# Patient Record
Sex: MALE | Race: BLACK OR AFRICAN AMERICAN | NOT HISPANIC OR LATINO | Employment: OTHER | URBAN - METROPOLITAN AREA
[De-identification: names, ages, dates, MRNs, and addresses within clinical notes are randomized per-mention and may not be internally consistent; named-entity substitution may affect disease eponyms.]

---

## 2017-04-24 ENCOUNTER — HISTORICAL (OUTPATIENT)
Dept: ADMINISTRATIVE | Facility: HOSPITAL | Age: 77
End: 2017-04-24

## 2017-04-24 LAB — PSA SERPL-MCNC: <0.1 NG/ML (ref 0–4)

## 2018-03-29 ENCOUNTER — HISTORICAL (OUTPATIENT)
Dept: CARDIOLOGY | Facility: HOSPITAL | Age: 78
End: 2018-03-29

## 2018-03-29 LAB
APTT PPP: 40.6 SECOND(S) (ref 24.8–36.9)
ERYTHROCYTE [DISTWIDTH] IN BLOOD BY AUTOMATED COUNT: 16.1 % (ref 11.5–17)
HCT VFR BLD AUTO: 43.4 % (ref 42–52)
HGB BLD-MCNC: 12.9 GM/DL (ref 14–18)
INR PPP: 1.03 (ref 0–1.27)
MCH RBC QN AUTO: 23.1 PG (ref 27–31)
MCHC RBC AUTO-ENTMCNC: 29.7 GM/DL (ref 33–36)
MCV RBC AUTO: 77.6 FL (ref 80–94)
PLATELET # BLD AUTO: 228 X10(3)/MCL (ref 130–400)
PMV BLD AUTO: 10.2 FL (ref 9.4–12.4)
PROTHROMBIN TIME: 13.8 SECOND(S) (ref 12.2–14.7)
RBC # BLD AUTO: 5.59 X10(6)/MCL (ref 4.7–6.1)
WBC # SPEC AUTO: 5.1 X10(3)/MCL (ref 4.5–11.5)

## 2018-04-23 ENCOUNTER — HISTORICAL (OUTPATIENT)
Dept: ADMINISTRATIVE | Facility: HOSPITAL | Age: 78
End: 2018-04-23

## 2018-04-23 LAB — PSA SERPL-MCNC: <0.1 NG/ML

## 2018-04-27 ENCOUNTER — HISTORICAL (OUTPATIENT)
Dept: RADIOLOGY | Facility: HOSPITAL | Age: 78
End: 2018-04-27

## 2018-04-30 ENCOUNTER — HISTORICAL (OUTPATIENT)
Dept: RADIATION THERAPY | Facility: HOSPITAL | Age: 78
End: 2018-04-30

## 2018-05-02 ENCOUNTER — HISTORICAL (OUTPATIENT)
Dept: RADIATION THERAPY | Facility: HOSPITAL | Age: 78
End: 2018-05-02

## 2018-05-07 ENCOUNTER — HISTORICAL (OUTPATIENT)
Dept: RADIATION THERAPY | Facility: HOSPITAL | Age: 78
End: 2018-05-07

## 2018-05-09 ENCOUNTER — HISTORICAL (OUTPATIENT)
Dept: RADIATION THERAPY | Facility: HOSPITAL | Age: 78
End: 2018-05-09

## 2018-05-11 ENCOUNTER — HISTORICAL (OUTPATIENT)
Dept: RADIATION THERAPY | Facility: HOSPITAL | Age: 78
End: 2018-05-11

## 2018-05-14 ENCOUNTER — HISTORICAL (OUTPATIENT)
Dept: RADIATION THERAPY | Facility: HOSPITAL | Age: 78
End: 2018-05-14

## 2018-05-16 ENCOUNTER — HISTORICAL (OUTPATIENT)
Dept: RADIATION THERAPY | Facility: HOSPITAL | Age: 78
End: 2018-05-16

## 2018-05-22 ENCOUNTER — HISTORICAL (OUTPATIENT)
Dept: RADIATION THERAPY | Facility: HOSPITAL | Age: 78
End: 2018-05-22

## 2018-08-17 ENCOUNTER — HISTORICAL (OUTPATIENT)
Dept: RADIOLOGY | Facility: HOSPITAL | Age: 78
End: 2018-08-17

## 2018-08-17 LAB — POC CREATININE: 1.3 MG/DL (ref 0.6–1.3)

## 2018-08-22 ENCOUNTER — HISTORICAL (OUTPATIENT)
Dept: RADIATION THERAPY | Facility: HOSPITAL | Age: 78
End: 2018-08-22

## 2018-11-27 ENCOUNTER — HISTORICAL (OUTPATIENT)
Dept: RADIOLOGY | Facility: HOSPITAL | Age: 78
End: 2018-11-27

## 2018-11-27 LAB — POC CREATININE: 1.3 MG/DL (ref 0.6–1.3)

## 2018-11-28 ENCOUNTER — HISTORICAL (OUTPATIENT)
Dept: RADIATION THERAPY | Facility: HOSPITAL | Age: 78
End: 2018-11-28

## 2019-02-19 ENCOUNTER — HISTORICAL (OUTPATIENT)
Dept: RADIOLOGY | Facility: HOSPITAL | Age: 79
End: 2019-02-19

## 2019-02-19 LAB — POC CREATININE: 1.3 MG/DL (ref 0.6–1.3)

## 2019-02-27 ENCOUNTER — HISTORICAL (OUTPATIENT)
Dept: RADIATION THERAPY | Facility: HOSPITAL | Age: 79
End: 2019-02-27

## 2019-03-21 ENCOUNTER — HISTORICAL (OUTPATIENT)
Dept: RADIOLOGY | Facility: HOSPITAL | Age: 79
End: 2019-03-21

## 2019-05-29 ENCOUNTER — HISTORICAL (OUTPATIENT)
Dept: RADIOLOGY | Facility: HOSPITAL | Age: 79
End: 2019-05-29

## 2019-05-29 LAB — POC CREATININE: 1.5 MG/DL (ref 0.6–1.3)

## 2019-06-03 ENCOUNTER — HISTORICAL (OUTPATIENT)
Dept: RADIATION THERAPY | Facility: HOSPITAL | Age: 79
End: 2019-06-03

## 2020-01-27 ENCOUNTER — HISTORICAL (OUTPATIENT)
Dept: RADIOLOGY | Facility: HOSPITAL | Age: 80
End: 2020-01-27

## 2020-07-21 ENCOUNTER — HOSPITAL ENCOUNTER (OUTPATIENT)
Dept: MEDSURG UNIT | Facility: HOSPITAL | Age: 80
End: 2020-07-22
Attending: INTERNAL MEDICINE | Admitting: INTERNAL MEDICINE

## 2020-07-21 LAB
ABS NEUT (OLG): 2.21 X10(3)/MCL (ref 2.1–9.2)
ALBUMIN SERPL-MCNC: 2.9 GM/DL (ref 3.4–5)
ALBUMIN/GLOB SERPL: 0.7 RATIO (ref 1.1–2)
ALP SERPL-CCNC: 69 UNIT/L (ref 45–117)
ALT SERPL-CCNC: 28 UNIT/L (ref 12–78)
APPEARANCE, UA: CLEAR
AST SERPL-CCNC: 30 UNIT/L (ref 15–37)
BACTERIA #/AREA URNS AUTO: ABNORMAL /HPF
BASOPHILS # BLD AUTO: 0 X10(3)/MCL (ref 0–0.2)
BASOPHILS NFR BLD AUTO: 0 %
BILIRUB SERPL-MCNC: 0.8 MG/DL (ref 0.2–1)
BILIRUB UR QL STRIP: NEGATIVE
BILIRUBIN DIRECT+TOT PNL SERPL-MCNC: 0.3 MG/DL (ref 0–0.2)
BILIRUBIN DIRECT+TOT PNL SERPL-MCNC: 0.5 MG/DL
BUN SERPL-MCNC: 21 MG/DL (ref 7–18)
BUN SERPL-MCNC: 26 MG/DL (ref 7–18)
CALCIUM SERPL-MCNC: 7.4 MG/DL (ref 8.5–10.1)
CALCIUM SERPL-MCNC: 8.2 MG/DL (ref 8.5–10.1)
CHLORIDE SERPL-SCNC: 110 MMOL/L (ref 98–107)
CHLORIDE SERPL-SCNC: 115 MMOL/L (ref 98–107)
CK MB SERPL-MCNC: <1 NG/ML (ref 1–3.6)
CK SERPL-CCNC: 80 UNIT/L (ref 39–308)
CO2 SERPL-SCNC: 22 MMOL/L (ref 21–32)
CO2 SERPL-SCNC: 24 MMOL/L (ref 21–32)
COLOR UR: YELLOW
CREAT SERPL-MCNC: 1.5 MG/DL (ref 0.6–1.3)
CREAT SERPL-MCNC: 1.9 MG/DL (ref 0.6–1.3)
CREAT/UREA NIT SERPL: 14 MG/DL (ref 12–14)
CRP SERPL-MCNC: 2.5 MG/DL
D DIMER PPP IA.FEU-MCNC: 0.44 MCG/ML FEU
ERYTHROCYTE [DISTWIDTH] IN BLOOD BY AUTOMATED COUNT: 16.5 % (ref 11.5–14.5)
ERYTHROCYTE [SEDIMENTATION RATE] IN BLOOD: 7 MM/HR (ref 0–15)
FERRITIN SERPL-MCNC: 392.1 NG/ML (ref 26–388)
GLOBULIN SER-MCNC: 3.9 GM/ML (ref 2.3–3.5)
GLUCOSE (UA): NEGATIVE
GLUCOSE SERPL-MCNC: 102 MG/DL (ref 74–106)
GLUCOSE SERPL-MCNC: 112 MG/DL (ref 74–106)
HCO3 UR-SCNC: 17.5 MMOL/L (ref 22–26)
HCT VFR BLD AUTO: 47.1 % (ref 40–51)
HGB BLD-MCNC: 14.5 GM/DL (ref 13.5–17.5)
HGB UR QL STRIP: NEGATIVE
HYALINE CASTS #/AREA URNS LPF: ABNORMAL /LPF
INR PPP: 1.13 (ref 0.9–1.2)
KETONES UR QL STRIP: 10 MG/DL
LACTATE SERPL-SCNC: 1.4 MMOL/L (ref 0.4–2)
LACTATE SERPL-SCNC: 2.8 MMOL/L (ref 0.4–2)
LDH SERPL-CCNC: 164 UNIT/L (ref 87–241)
LEUKOCYTE ESTERASE UR QL STRIP: NEGATIVE
LYMPHOCYTES # BLD AUTO: 0.7 X10(3)/MCL (ref 0.6–4.6)
LYMPHOCYTES NFR BLD AUTO: 22 %
MAGNESIUM SERPL-MCNC: 2 MG/DL (ref 1.6–2.6)
MAGNESIUM SERPL-MCNC: 2.1 MG/DL (ref 1.6–2.6)
MCH RBC QN AUTO: 23.6 PG (ref 26–34)
MCHC RBC AUTO-ENTMCNC: 30.8 GM/DL (ref 31–37)
MCV RBC AUTO: 76.7 FL (ref 80–100)
MONOCYTES # BLD AUTO: 0.3 X10(3)/MCL (ref 0.1–1.3)
MONOCYTES NFR BLD AUTO: 10 %
NEUTROPHILS # BLD AUTO: 2.21 X10(3)/MCL (ref 2.1–9.2)
NEUTROPHILS NFR BLD AUTO: 68 %
NITRITE UR QL STRIP: NEGATIVE
O2 HGB ARTERIAL: 93.6 % (ref 94–100)
PCO2 BLDA: 27 MMHG (ref 35–45)
PH SMN: 7.42 [PH] (ref 7.35–7.45)
PH UR STRIP: 5.5 [PH] (ref 4.5–8)
PLATELET # BLD AUTO: 156 X10(3)/MCL (ref 130–400)
PMV BLD AUTO: 11.3 FL (ref 7.4–10.4)
PO2 BLDA: 69 MMHG (ref 80–100)
POC ALLENS TEST: POSITIVE
POC BE: -5.6 (ref -2–2)
POC CO HGB: 1.6 %
POC CO2: 18.3 MMOL/L (ref 22–26)
POC MET HGB: 1.3 % (ref 0.4–1.5)
POC SAMPLESOURCE: ABNORMAL
POC SATURATED O2: 96.4 %
POC SITE: ABNORMAL
POC THB: 12.9 GM/DL (ref 12–16)
POC TREATMENT: ABNORMAL
POTASSIUM SERPL-SCNC: 3.5 MMOL/L (ref 3.5–5.1)
POTASSIUM SERPL-SCNC: 3.9 MMOL/L (ref 3.5–5.1)
PROT SERPL-MCNC: 6.8 GM/DL (ref 6.4–8.2)
PROT UR QL STRIP: 10 MG/DL
PROTHROMBIN TIME: 14.1 SECOND(S) (ref 11.9–14.4)
RBC # BLD AUTO: 6.14 X10(6)/MCL (ref 4.5–5.9)
RBC #/AREA URNS AUTO: ABNORMAL /HPF
SARS-COV-2 AG RESP QL IA.RAPID: POSITIVE
SODIUM SERPL-SCNC: 143 MMOL/L (ref 136–145)
SODIUM SERPL-SCNC: 143 MMOL/L (ref 136–145)
SP GR UR STRIP: 1.01 (ref 1–1.03)
SQUAMOUS #/AREA URNS LPF: ABNORMAL /LPF
TROPONIN I SERPL-MCNC: 0.03 NG/ML (ref 0–0.05)
TROPONIN I SERPL-MCNC: 0.03 NG/ML (ref 0–0.05)
TROPONIN I SERPL-MCNC: <0.015 NG/ML (ref 0–0.05)
UROBILINOGEN UR STRIP-ACNC: 2 MG/DL
WBC # SPEC AUTO: 3.2 X10(3)/MCL (ref 4.5–11)
WBC #/AREA URNS AUTO: ABNORMAL /HPF

## 2020-07-22 LAB
ABS NEUT (OLG): 2.56 X10(3)/MCL (ref 2.1–9.2)
BASOPHILS # BLD AUTO: 0 X10(3)/MCL (ref 0–0.2)
BASOPHILS NFR BLD AUTO: 0 %
BUN SERPL-MCNC: 17 MG/DL (ref 7–18)
CALCIUM SERPL-MCNC: 7.7 MG/DL (ref 8.5–10.1)
CHLORIDE SERPL-SCNC: 113 MMOL/L (ref 98–107)
CO2 SERPL-SCNC: 23 MMOL/L (ref 21–32)
CREAT SERPL-MCNC: 1.2 MG/DL (ref 0.6–1.3)
CREAT/UREA NIT SERPL: 14 MG/DL (ref 12–14)
EOSINOPHIL # BLD AUTO: 0 X10(3)/MCL (ref 0–0.9)
EOSINOPHIL NFR BLD AUTO: 0 %
ERYTHROCYTE [DISTWIDTH] IN BLOOD BY AUTOMATED COUNT: 15.1 % (ref 11.5–14.5)
GLUCOSE SERPL-MCNC: 83 MG/DL (ref 74–106)
HCT VFR BLD AUTO: 41.2 % (ref 40–51)
HGB BLD-MCNC: 12.6 GM/DL (ref 13.5–17.5)
IMM GRANULOCYTES # BLD AUTO: 0.01 10*3/UL
IMM GRANULOCYTES NFR BLD AUTO: 0 %
LYMPHOCYTES # BLD AUTO: 0.9 X10(3)/MCL (ref 0.6–4.6)
LYMPHOCYTES NFR BLD AUTO: 22 %
MAGNESIUM SERPL-MCNC: 2.1 MG/DL (ref 1.6–2.6)
MCH RBC QN AUTO: 23.4 PG (ref 26–34)
MCHC RBC AUTO-ENTMCNC: 30.6 GM/DL (ref 31–37)
MCV RBC AUTO: 76.4 FL (ref 80–100)
MONOCYTES # BLD AUTO: 0.6 X10(3)/MCL (ref 0.1–1.3)
MONOCYTES NFR BLD AUTO: 14 %
NEUTROPHILS # BLD AUTO: 2.56 X10(3)/MCL (ref 2.1–9.2)
NEUTROPHILS NFR BLD AUTO: 63 %
PHOSPHATE SERPL-MCNC: 2.1 MG/DL (ref 2.5–4.9)
PLATELET # BLD AUTO: 137 X10(3)/MCL (ref 130–400)
PMV BLD AUTO: 12 FL (ref 7.4–10.4)
POTASSIUM SERPL-SCNC: 3.9 MMOL/L (ref 3.5–5.1)
RBC # BLD AUTO: 5.39 X10(6)/MCL (ref 4.5–5.9)
SODIUM SERPL-SCNC: 142 MMOL/L (ref 136–145)
WBC # SPEC AUTO: 4.1 X10(3)/MCL (ref 4.5–11)

## 2020-07-26 LAB
FINAL CULTURE: NORMAL
FINAL CULTURE: NORMAL

## 2020-08-18 ENCOUNTER — HISTORICAL (OUTPATIENT)
Dept: RADIATION THERAPY | Facility: HOSPITAL | Age: 80
End: 2020-08-18

## 2021-02-18 ENCOUNTER — HISTORICAL (OUTPATIENT)
Dept: RADIOLOGY | Facility: HOSPITAL | Age: 81
End: 2021-02-18

## 2021-02-18 LAB — POC CREATININE: 1.4 MG/DL (ref 0.6–1.3)

## 2021-02-24 ENCOUNTER — HISTORICAL (OUTPATIENT)
Dept: RADIATION THERAPY | Facility: HOSPITAL | Age: 81
End: 2021-02-24

## 2021-03-10 ENCOUNTER — HISTORICAL (OUTPATIENT)
Dept: RADIOLOGY | Facility: HOSPITAL | Age: 81
End: 2021-03-10

## 2021-07-14 ENCOUNTER — HOSPITAL ENCOUNTER (OUTPATIENT)
Dept: EMERGENCY MEDICINE | Facility: HOSPITAL | Age: 81
End: 2021-07-15
Attending: INTERNAL MEDICINE | Admitting: INTERNAL MEDICINE

## 2021-07-14 LAB
ABS NEUT (OLG): 3.31 X10(3)/MCL (ref 2.1–9.2)
ALBUMIN SERPL-MCNC: 3.4 GM/DL (ref 3.4–4.8)
ALBUMIN/GLOB SERPL: 1 RATIO (ref 1.1–2)
ALP SERPL-CCNC: 88 UNIT/L (ref 40–150)
ALT SERPL-CCNC: 19 UNIT/L (ref 0–55)
APTT PPP: 36.6 SECOND(S) (ref 23.2–33.7)
AST SERPL-CCNC: 22 UNIT/L (ref 5–34)
BASOPHILS # BLD AUTO: 0 X10(3)/MCL (ref 0–0.2)
BASOPHILS NFR BLD AUTO: 0 %
BILIRUB SERPL-MCNC: 0.7 MG/DL
BILIRUBIN DIRECT+TOT PNL SERPL-MCNC: 0.3 MG/DL (ref 0–0.5)
BILIRUBIN DIRECT+TOT PNL SERPL-MCNC: 0.4 MG/DL (ref 0–0.8)
BUN SERPL-MCNC: 13.8 MG/DL (ref 8.4–25.7)
CALCIUM SERPL-MCNC: 8.9 MG/DL (ref 8.8–10)
CHLORIDE SERPL-SCNC: 109 MMOL/L (ref 98–107)
CO2 SERPL-SCNC: 24 MMOL/L (ref 23–31)
CREAT SERPL-MCNC: 1.61 MG/DL (ref 0.73–1.18)
EOSINOPHIL # BLD AUTO: 0.2 X10(3)/MCL (ref 0–0.9)
EOSINOPHIL NFR BLD AUTO: 3 %
ERYTHROCYTE [DISTWIDTH] IN BLOOD BY AUTOMATED COUNT: 16.7 % (ref 11.5–17)
GLOBULIN SER-MCNC: 3.3 GM/DL (ref 2.4–3.5)
GLUCOSE SERPL-MCNC: 84 MG/DL (ref 82–115)
HCT VFR BLD AUTO: 49.1 % (ref 42–52)
HGB BLD-MCNC: 14.7 GM/DL (ref 14–18)
INR PPP: 1 (ref 0–1.3)
LYMPHOCYTES # BLD AUTO: 1.1 X10(3)/MCL (ref 0.6–4.6)
LYMPHOCYTES NFR BLD AUTO: 21 %
MCH RBC QN AUTO: 23.6 PG (ref 27–31)
MCHC RBC AUTO-ENTMCNC: 29.9 GM/DL (ref 33–36)
MCV RBC AUTO: 78.7 FL (ref 80–94)
MONOCYTES # BLD AUTO: 0.8 X10(3)/MCL (ref 0.1–1.3)
MONOCYTES NFR BLD AUTO: 15 %
NEUTROPHILS # BLD AUTO: 3.31 X10(3)/MCL (ref 2.1–9.2)
NEUTROPHILS NFR BLD AUTO: 61 %
PLATELET # BLD AUTO: 250 X10(3)/MCL (ref 130–400)
PMV BLD AUTO: 9.8 FL (ref 9.4–12.4)
POTASSIUM SERPL-SCNC: 4.8 MMOL/L (ref 3.5–5.1)
PROT SERPL-MCNC: 6.7 GM/DL (ref 5.8–7.6)
PROTHROMBIN TIME: 13.3 SECOND(S) (ref 12.5–14.5)
RBC # BLD AUTO: 6.24 X10(6)/MCL (ref 4.7–6.1)
SODIUM SERPL-SCNC: 144 MMOL/L (ref 136–145)
WBC # SPEC AUTO: 5.4 X10(3)/MCL (ref 4.5–11.5)

## 2021-07-15 LAB
EST CREAT CLEARANCE SER (OHS): 31.84 ML/MIN
SARS-COV-2 AG RESP QL IA.RAPID: NEGATIVE

## 2021-07-26 ENCOUNTER — HISTORICAL (OUTPATIENT)
Dept: RADIATION THERAPY | Facility: HOSPITAL | Age: 81
End: 2021-07-26

## 2021-08-10 ENCOUNTER — HISTORICAL (OUTPATIENT)
Dept: RADIOLOGY | Facility: HOSPITAL | Age: 81
End: 2021-08-10

## 2021-08-16 ENCOUNTER — HISTORICAL (OUTPATIENT)
Dept: RADIATION THERAPY | Facility: HOSPITAL | Age: 81
End: 2021-08-16

## 2021-08-23 ENCOUNTER — HISTORICAL (OUTPATIENT)
Dept: RADIATION THERAPY | Facility: HOSPITAL | Age: 81
End: 2021-08-23

## 2021-08-25 ENCOUNTER — HISTORICAL (OUTPATIENT)
Dept: RADIATION THERAPY | Facility: HOSPITAL | Age: 81
End: 2021-08-25

## 2021-08-26 ENCOUNTER — HISTORICAL (OUTPATIENT)
Dept: RADIATION THERAPY | Facility: HOSPITAL | Age: 81
End: 2021-08-26

## 2021-08-27 ENCOUNTER — HISTORICAL (OUTPATIENT)
Dept: RADIATION THERAPY | Facility: HOSPITAL | Age: 81
End: 2021-08-27

## 2021-08-31 ENCOUNTER — HISTORICAL (OUTPATIENT)
Dept: RADIATION THERAPY | Facility: HOSPITAL | Age: 81
End: 2021-08-31

## 2021-09-01 ENCOUNTER — HISTORICAL (OUTPATIENT)
Dept: RADIATION THERAPY | Facility: HOSPITAL | Age: 81
End: 2021-09-01

## 2021-09-02 ENCOUNTER — HISTORICAL (OUTPATIENT)
Dept: RADIATION THERAPY | Facility: HOSPITAL | Age: 81
End: 2021-09-02

## 2021-09-03 ENCOUNTER — HISTORICAL (OUTPATIENT)
Dept: RADIATION THERAPY | Facility: HOSPITAL | Age: 81
End: 2021-09-03

## 2021-09-07 ENCOUNTER — HISTORICAL (OUTPATIENT)
Dept: RADIATION THERAPY | Facility: HOSPITAL | Age: 81
End: 2021-09-07

## 2021-09-08 ENCOUNTER — HISTORICAL (OUTPATIENT)
Dept: RADIATION THERAPY | Facility: HOSPITAL | Age: 81
End: 2021-09-08

## 2021-09-09 ENCOUNTER — HISTORICAL (OUTPATIENT)
Dept: RADIATION THERAPY | Facility: HOSPITAL | Age: 81
End: 2021-09-09

## 2021-09-10 ENCOUNTER — HISTORICAL (OUTPATIENT)
Dept: HEMATOLOGY/ONCOLOGY | Facility: CLINIC | Age: 81
End: 2021-09-10

## 2021-09-10 ENCOUNTER — HISTORICAL (OUTPATIENT)
Dept: RADIATION THERAPY | Facility: HOSPITAL | Age: 81
End: 2021-09-10

## 2021-09-10 LAB
ABS NEUT (OLG): 2.27 X10(3)/MCL (ref 2.1–9.2)
ALBUMIN SERPL-MCNC: 3.2 GM/DL (ref 3.4–4.8)
ALBUMIN/GLOB SERPL: 1.1 RATIO (ref 1.1–2)
ALP SERPL-CCNC: 78 UNIT/L (ref 40–150)
ALT SERPL-CCNC: 11 UNIT/L (ref 0–55)
AST SERPL-CCNC: 19 UNIT/L (ref 5–34)
BASOPHILS # BLD AUTO: 0 X10(3)/MCL (ref 0–0.2)
BASOPHILS NFR BLD AUTO: 0.3 %
BILIRUB SERPL-MCNC: 0.5 MG/DL
BILIRUBIN DIRECT+TOT PNL SERPL-MCNC: 0.2 MG/DL (ref 0–0.8)
BILIRUBIN DIRECT+TOT PNL SERPL-MCNC: 0.3 MG/DL (ref 0–0.5)
BUN SERPL-MCNC: 15.7 MG/DL (ref 8.4–25.7)
CALCIUM SERPL-MCNC: 9 MG/DL (ref 8.8–10)
CHLORIDE SERPL-SCNC: 108 MMOL/L (ref 98–107)
CO2 SERPL-SCNC: 24 MMOL/L (ref 23–31)
CREAT SERPL-MCNC: 1.11 MG/DL (ref 0.73–1.18)
EOSINOPHIL # BLD AUTO: 0.1 X10(3)/MCL (ref 0–0.9)
EOSINOPHIL NFR BLD AUTO: 3.7 %
ERYTHROCYTE [DISTWIDTH] IN BLOOD BY AUTOMATED COUNT: 15.8 % (ref 11.5–17)
GLOBULIN SER-MCNC: 2.9 GM/DL (ref 2.4–3.5)
GLUCOSE SERPL-MCNC: 96 MG/DL (ref 82–115)
HCT VFR BLD AUTO: 45 % (ref 42–52)
HGB BLD-MCNC: 13.4 GM/DL (ref 14–18)
LYMPHOCYTES # BLD AUTO: 0.7 X10(3)/MCL (ref 0.6–4.6)
LYMPHOCYTES NFR BLD AUTO: 17.5 %
MCH RBC QN AUTO: 23.7 PG (ref 27–31)
MCHC RBC AUTO-ENTMCNC: 29.8 GM/DL (ref 33–36)
MCV RBC AUTO: 79.5 FL (ref 80–94)
MONOCYTES # BLD AUTO: 0.7 X10(3)/MCL (ref 0.1–1.3)
MONOCYTES NFR BLD AUTO: 18 %
NEUTROPHILS # BLD AUTO: 2.3 X10(3)/MCL (ref 2.1–9.2)
NEUTROPHILS NFR BLD AUTO: 60.2 %
PLATELET # BLD AUTO: 179 X10(3)/MCL (ref 130–400)
PMV BLD AUTO: 10.3 FL (ref 9.4–12.4)
POTASSIUM SERPL-SCNC: 4.2 MMOL/L (ref 3.5–5.1)
PROT SERPL-MCNC: 6.1 GM/DL (ref 5.8–7.6)
RBC # BLD AUTO: 5.66 X10(6)/MCL (ref 4.7–6.1)
SODIUM SERPL-SCNC: 144 MMOL/L (ref 136–145)
WBC # SPEC AUTO: 3.8 X10(3)/MCL (ref 4.5–11.5)

## 2021-09-13 ENCOUNTER — HISTORICAL (OUTPATIENT)
Dept: RADIATION THERAPY | Facility: HOSPITAL | Age: 81
End: 2021-09-13

## 2021-09-27 ENCOUNTER — HISTORICAL (OUTPATIENT)
Dept: RADIATION THERAPY | Facility: HOSPITAL | Age: 81
End: 2021-09-27

## 2021-10-15 ENCOUNTER — HISTORICAL (OUTPATIENT)
Dept: HEMATOLOGY/ONCOLOGY | Facility: CLINIC | Age: 81
End: 2021-10-15

## 2021-10-15 LAB
ABS NEUT (OLG): 3.93 X10(3)/MCL (ref 2.1–9.2)
ALBUMIN SERPL-MCNC: 3.5 GM/DL (ref 3.4–4.8)
ALBUMIN/GLOB SERPL: 1.2 RATIO (ref 1.1–2)
ALP SERPL-CCNC: 85 UNIT/L (ref 40–150)
ALT SERPL-CCNC: 15 UNIT/L (ref 0–55)
AST SERPL-CCNC: 18 UNIT/L (ref 5–34)
BASOPHILS # BLD AUTO: 0 X10(3)/MCL (ref 0–0.2)
BASOPHILS NFR BLD AUTO: 0.5 %
BILIRUB SERPL-MCNC: 0.4 MG/DL
BILIRUBIN DIRECT+TOT PNL SERPL-MCNC: 0.2 MG/DL (ref 0–0.5)
BILIRUBIN DIRECT+TOT PNL SERPL-MCNC: 0.2 MG/DL (ref 0–0.8)
BUN SERPL-MCNC: 14.5 MG/DL (ref 8.4–25.7)
CALCIUM SERPL-MCNC: 9.4 MG/DL (ref 8.8–10)
CHLORIDE SERPL-SCNC: 108 MMOL/L (ref 98–107)
CO2 SERPL-SCNC: 26 MMOL/L (ref 23–31)
CREAT SERPL-MCNC: 1.6 MG/DL (ref 0.73–1.18)
EOSINOPHIL # BLD AUTO: 0.1 X10(3)/MCL (ref 0–0.9)
EOSINOPHIL NFR BLD AUTO: 1.4 %
ERYTHROCYTE [DISTWIDTH] IN BLOOD BY AUTOMATED COUNT: 15 % (ref 11.5–17)
GLOBULIN SER-MCNC: 2.9 GM/DL (ref 2.4–3.5)
GLUCOSE SERPL-MCNC: 80 MG/DL (ref 82–115)
HCT VFR BLD AUTO: 43.3 % (ref 42–52)
HGB BLD-MCNC: 13.5 GM/DL (ref 14–18)
LYMPHOCYTES # BLD AUTO: 0.7 X10(3)/MCL (ref 0.6–4.6)
LYMPHOCYTES NFR BLD AUTO: 11.6 %
MCH RBC QN AUTO: 23.9 PG (ref 27–31)
MCHC RBC AUTO-ENTMCNC: 31.2 GM/DL (ref 33–36)
MCV RBC AUTO: 76.8 FL (ref 80–94)
MONOCYTES # BLD AUTO: 1.1 X10(3)/MCL (ref 0.1–1.3)
MONOCYTES NFR BLD AUTO: 18.9 %
NEUTROPHILS # BLD AUTO: 3.9 X10(3)/MCL (ref 2.1–9.2)
NEUTROPHILS NFR BLD AUTO: 67.1 %
PLATELET # BLD AUTO: 211 X10(3)/MCL (ref 130–400)
PMV BLD AUTO: 9.3 FL (ref 9.4–12.4)
POTASSIUM SERPL-SCNC: 4.5 MMOL/L (ref 3.5–5.1)
PROT SERPL-MCNC: 6.4 GM/DL (ref 5.8–7.6)
RBC # BLD AUTO: 5.64 X10(6)/MCL (ref 4.7–6.1)
SODIUM SERPL-SCNC: 143 MMOL/L (ref 136–145)
WBC # SPEC AUTO: 5.9 X10(3)/MCL (ref 4.5–11.5)

## 2021-11-03 ENCOUNTER — HISTORICAL (OUTPATIENT)
Dept: RADIATION THERAPY | Facility: HOSPITAL | Age: 81
End: 2021-11-03

## 2021-11-17 ENCOUNTER — HISTORICAL (OUTPATIENT)
Dept: RADIATION THERAPY | Facility: HOSPITAL | Age: 81
End: 2021-11-17

## 2022-01-01 ENCOUNTER — HOSPITAL ENCOUNTER (INPATIENT)
Facility: HOSPITAL | Age: 82
LOS: 2 days | Discharge: HOME HEALTH | DRG: 683 | End: 2022-11-02
Attending: EMERGENCY MEDICINE | Admitting: INTERNAL MEDICINE
Payer: COMMERCIAL

## 2022-01-01 VITALS
SYSTOLIC BLOOD PRESSURE: 146 MMHG | DIASTOLIC BLOOD PRESSURE: 82 MMHG | HEIGHT: 65 IN | OXYGEN SATURATION: 99 % | RESPIRATION RATE: 16 BRPM | HEART RATE: 61 BPM | WEIGHT: 128 LBS | TEMPERATURE: 98 F | BODY MASS INDEX: 21.33 KG/M2

## 2022-01-01 DIAGNOSIS — R55 SYNCOPE: ICD-10-CM

## 2022-01-01 DIAGNOSIS — C15.3 PRIMARY SQUAMOUS CELL CARCINOMA OF UPPER THIRD OF ESOPHAGUS: ICD-10-CM

## 2022-01-01 DIAGNOSIS — R55 SYNCOPE, UNSPECIFIED SYNCOPE TYPE: ICD-10-CM

## 2022-01-01 DIAGNOSIS — I63.9 STROKE: ICD-10-CM

## 2022-01-01 DIAGNOSIS — R56.9 WITNESSED SEIZURE-LIKE ACTIVITY: Primary | ICD-10-CM

## 2022-01-01 LAB
ABS NEUT (OLG): 3.12 X10(3)/MCL (ref 2.1–9.2)
ALBUMIN SERPL-MCNC: 2.9 GM/DL (ref 3.4–4.8)
ALBUMIN SERPL-MCNC: 3.7 GM/DL (ref 3.4–4.8)
ALBUMIN/GLOB SERPL: 1.2 RATIO (ref 1.1–2)
ALBUMIN/GLOB SERPL: 1.3 RATIO (ref 1.1–2)
ALP SERPL-CCNC: 61 UNIT/L (ref 40–150)
ALP SERPL-CCNC: 80 UNIT/L (ref 40–150)
ALT SERPL-CCNC: 13 UNIT/L (ref 0–55)
ALT SERPL-CCNC: 16 UNIT/L (ref 0–55)
AMPHET UR QL SCN: NEGATIVE
ANION GAP SERPL CALC-SCNC: 10 MEQ/L
APPEARANCE UR: CLEAR
APTT PPP: 36 SECONDS (ref 23.2–33.7)
AST SERPL-CCNC: 15 UNIT/L (ref 5–34)
AST SERPL-CCNC: 18 UNIT/L (ref 5–34)
AV INDEX (PROSTH): 0.55
AV MEAN GRADIENT: 4 MMHG
AV PEAK GRADIENT: 7 MMHG
AV REGURGITATION PRESSURE HALF TIME: 672 MS
AV VALVE AREA: 1.56 CM2
AV VELOCITY RATIO: 0.53
BACTERIA #/AREA URNS AUTO: ABNORMAL /HPF
BACTERIA BLD CULT: NORMAL
BACTERIA BLD CULT: NORMAL
BACTERIA UR CULT: NO GROWTH
BARBITURATE SCN PRESENT UR: NEGATIVE
BASOPHILS # BLD AUTO: 0.01 X10(3)/MCL (ref 0–0.2)
BASOPHILS NFR BLD AUTO: 0.2 %
BENZODIAZ UR QL SCN: NEGATIVE
BILIRUB UR QL STRIP.AUTO: NEGATIVE MG/DL
BILIRUBIN DIRECT+TOT PNL SERPL-MCNC: 0.7 MG/DL
BILIRUBIN DIRECT+TOT PNL SERPL-MCNC: 0.8 MG/DL
BSA FOR ECHO PROCEDURE: 1.63 M2
BUN SERPL-MCNC: 11.9 MG/DL (ref 8.4–25.7)
BUN SERPL-MCNC: 13.4 MG/DL (ref 8.4–25.7)
BUN SERPL-MCNC: 14.8 MG/DL (ref 8.4–25.7)
CALCIUM SERPL-MCNC: 8.4 MG/DL (ref 8.8–10)
CALCIUM SERPL-MCNC: 8.7 MG/DL (ref 8.8–10)
CALCIUM SERPL-MCNC: 9.3 MG/DL (ref 8.8–10)
CANNABINOIDS UR QL SCN: NEGATIVE
CHLORIDE SERPL-SCNC: 107 MMOL/L (ref 98–107)
CHLORIDE SERPL-SCNC: 108 MMOL/L (ref 98–107)
CHLORIDE SERPL-SCNC: 108 MMOL/L (ref 98–107)
CHOLEST SERPL-MCNC: 109 MG/DL
CHOLEST/HDLC SERPL: 3 {RATIO} (ref 0–5)
CO2 SERPL-SCNC: 22 MMOL/L (ref 23–31)
CO2 SERPL-SCNC: 23 MMOL/L (ref 23–31)
CO2 SERPL-SCNC: 25 MMOL/L (ref 23–31)
COCAINE UR QL SCN: NEGATIVE
COLOR UR AUTO: ABNORMAL
CREAT SERPL-MCNC: 1.14 MG/DL (ref 0.73–1.18)
CREAT SERPL-MCNC: 1.24 MG/DL (ref 0.73–1.18)
CREAT SERPL-MCNC: 1.47 MG/DL (ref 0.73–1.18)
CREAT/UREA NIT SERPL: 10
CRP SERPL HS-MCNC: 1.65 MG/L
CV ECHO LV RWT: 0.77 CM
DOP CALC AO PEAK VEL: 1.32 M/S
DOP CALC AO VTI: 31.6 CM
DOP CALC LVOT AREA: 2.8 CM2
DOP CALC LVOT DIAMETER: 1.9 CM
DOP CALC LVOT PEAK VEL: 0.7 M/S
DOP CALC LVOT STROKE VOLUME: 49.31 CM3
DOP CALC MV VTI: 44.9 CM
DOP CALCLVOT PEAK VEL VTI: 17.4 CM
E WAVE DECELERATION TIME: 359 MSEC
E/A RATIO: 0.63
E/E' RATIO: 8.17 M/S
ECHO LV POSTERIOR WALL: 1.68 CM (ref 0.6–1.1)
EJECTION FRACTION: 45 %
EOSINOPHIL # BLD AUTO: 0.05 X10(3)/MCL (ref 0–0.9)
EOSINOPHIL NFR BLD AUTO: 1 %
ERYTHROCYTE [DISTWIDTH] IN BLOOD BY AUTOMATED COUNT: 15.1 % (ref 11.5–17)
ERYTHROCYTE [DISTWIDTH] IN BLOOD BY AUTOMATED COUNT: 15.1 % (ref 11.5–17)
ERYTHROCYTE [DISTWIDTH] IN BLOOD BY AUTOMATED COUNT: 16.3 % (ref 11.5–17)
ERYTHROCYTE [SEDIMENTATION RATE] IN BLOOD: 17 MM/HR (ref 0–15)
EST. AVERAGE GLUCOSE BLD GHB EST-MCNC: 114 MG/DL
FENTANYL UR QL SCN: POSITIVE
FLUAV AG UPPER RESP QL IA.RAPID: NOT DETECTED
FLUBV AG UPPER RESP QL IA.RAPID: NOT DETECTED
FRACTIONAL SHORTENING: 32 % (ref 28–44)
GFR SERPLBLD CREATININE-BSD FMLA CKD-EPI: 48 MLS/MIN/1.73/M2
GFR SERPLBLD CREATININE-BSD FMLA CKD-EPI: 58 MLS/MIN/1.73/M2
GFR SERPLBLD CREATININE-BSD FMLA CKD-EPI: >60 MLS/MIN/1.73/M2
GLOBULIN SER-MCNC: 2.3 GM/DL (ref 2.4–3.5)
GLOBULIN SER-MCNC: 3.1 GM/DL (ref 2.4–3.5)
GLUCOSE SERPL-MCNC: 104 MG/DL (ref 82–115)
GLUCOSE SERPL-MCNC: 79 MG/DL (ref 82–115)
GLUCOSE SERPL-MCNC: 89 MG/DL (ref 82–115)
GLUCOSE UR QL STRIP.AUTO: NEGATIVE MG/DL
HBA1C MFR BLD: 5.6 %
HCT VFR BLD AUTO: 40.5 % (ref 42–52)
HCT VFR BLD AUTO: 42.8 % (ref 42–52)
HCT VFR BLD AUTO: 48.7 % (ref 42–52)
HDLC SERPL-MCNC: 43 MG/DL (ref 35–60)
HGB BLD-MCNC: 12.5 GM/DL (ref 14–18)
HGB BLD-MCNC: 13.2 GM/DL (ref 14–18)
HGB BLD-MCNC: 14.8 GM/DL (ref 14–18)
IMM GRANULOCYTES # BLD AUTO: 0.01 X10(3)/MCL (ref 0–0.04)
IMM GRANULOCYTES # BLD AUTO: 0.02 X10(3)/MCL (ref 0–0.04)
IMM GRANULOCYTES NFR BLD AUTO: 0.2 %
IMM GRANULOCYTES NFR BLD AUTO: 0.4 %
INR BLD: 1 (ref 0–1.3)
INSTRUMENT WBC (OLG): 4.4 X10(3)/MCL
INTERVENTRICULAR SEPTUM: 1.23 CM (ref 0.6–1.1)
KETONES UR QL STRIP.AUTO: ABNORMAL MG/DL
LACTATE SERPL-SCNC: 1.7 MMOL/L (ref 0.5–2.2)
LDLC SERPL CALC-MCNC: 52 MG/DL (ref 50–140)
LEFT ATRIUM SIZE: 3.8 CM
LEFT ATRIUM VOLUME INDEX MOD: 27.4 ML/M2
LEFT ATRIUM VOLUME MOD: 45 CM3
LEFT CCA DIST DIAS: 8 CM/S
LEFT CCA DIST SYS: 30 CM/S
LEFT CCA PROX DIAS: 0 CM/S
LEFT CCA PROX SYS: 42 CM/S
LEFT ECA DIAS: 6 CM/S
LEFT ECA SYS: 55 CM/S
LEFT ICA DIST DIAS: 18 CM/S
LEFT ICA DIST SYS: 57 CM/S
LEFT ICA MID DIAS: 16 CM/S
LEFT ICA MID SYS: 54 CM/S
LEFT ICA PROX DIAS: 5 CM/S
LEFT ICA PROX SYS: 28 CM/S
LEFT INTERNAL DIMENSION IN SYSTOLE: 2.96 CM (ref 2.1–4)
LEFT VENTRICLE DIASTOLIC VOLUME INDEX: 52.62 ML/M2
LEFT VENTRICLE DIASTOLIC VOLUME: 86.3 ML
LEFT VENTRICLE MASS INDEX: 154 G/M2
LEFT VENTRICLE SYSTOLIC VOLUME INDEX: 20.7 ML/M2
LEFT VENTRICLE SYSTOLIC VOLUME: 33.9 ML
LEFT VENTRICULAR INTERNAL DIMENSION IN DIASTOLE: 4.37 CM (ref 3.5–6)
LEFT VENTRICULAR MASS: 252.18 G
LEFT VERTEBRAL DIAS: 5 CM/S
LEFT VERTEBRAL SYS: 34 CM/S
LEUKOCYTE ESTERASE UR QL STRIP.AUTO: ABNORMAL UNIT/L
LV LATERAL E/E' RATIO: 8.17 M/S
LV SEPTAL E/E' RATIO: 8.17 M/S
LVOT MG: 1 MMHG
LVOT MV: 0.46 CM/S
LYMPHOCYTES # BLD AUTO: 0.87 X10(3)/MCL (ref 0.6–4.6)
LYMPHOCYTES NFR BLD AUTO: 17.7 %
LYMPHOCYTES NFR BLD MANUAL: 0.62 X10(3)/MCL
LYMPHOCYTES NFR BLD MANUAL: 14 %
MAGNESIUM SERPL-MCNC: 1.8 MG/DL (ref 1.6–2.6)
MAGNESIUM SERPL-MCNC: 2.1 MG/DL (ref 1.6–2.6)
MCH RBC QN AUTO: 23.1 PG (ref 27–31)
MCHC RBC AUTO-ENTMCNC: 30.4 MG/DL (ref 33–36)
MCHC RBC AUTO-ENTMCNC: 30.8 MG/DL (ref 33–36)
MCHC RBC AUTO-ENTMCNC: 30.9 MG/DL (ref 33–36)
MCV RBC AUTO: 74.8 FL (ref 80–94)
MCV RBC AUTO: 75 FL (ref 80–94)
MCV RBC AUTO: 75.9 FL (ref 80–94)
MDMA UR QL SCN: NEGATIVE
MONOCYTES # BLD AUTO: 0.63 X10(3)/MCL (ref 0.1–1.3)
MONOCYTES NFR BLD AUTO: 12.8 %
MONOCYTES NFR BLD MANUAL: 0.66 X10(3)/MCL (ref 0.1–1.3)
MONOCYTES NFR BLD MANUAL: 15 %
MV MEAN GRADIENT: 2 MMHG
MV PEAK A VEL: 0.78 M/S
MV PEAK E VEL: 0.49 M/S
MV PEAK GRADIENT: 5 MMHG
MV STENOSIS PRESSURE HALF TIME: 139 MS
MV VALVE AREA BY CONTINUITY EQUATION: 1.1 CM2
MV VALVE AREA P 1/2 METHOD: 1.58 CM2
NEUTROPHILS # BLD AUTO: 3.3 X10(3)/MCL (ref 2.1–9.2)
NEUTROPHILS NFR BLD AUTO: 67.9 %
NEUTROPHILS NFR BLD MANUAL: 71 %
NITRITE UR QL STRIP.AUTO: NEGATIVE
NRBC BLD AUTO-RTO: 0 %
NRBC BLD MANUAL-RTO: 1 %
OHS CV CAROTID RIGHT ICA EDV HIGHEST: 10
OHS CV CAROTID ULTRASOUND LEFT ICA/CCA RATIO: 1.9
OHS CV CAROTID ULTRASOUND RIGHT ICA/CCA RATIO: 1.41
OHS CV PV CAROTID LEFT HIGHEST CCA: 42
OHS CV PV CAROTID LEFT HIGHEST ICA: 57
OHS CV PV CAROTID RIGHT HIGHEST CCA: 36
OHS CV PV CAROTID RIGHT HIGHEST ICA: 38
OHS CV US CAROTID LEFT HIGHEST EDV: 18
OPIATES UR QL SCN: POSITIVE
PCP UR QL: NEGATIVE
PH UR STRIP.AUTO: 5.5 [PH]
PH UR: 6.5 [PH] (ref 3–11)
PHOSPHATE SERPL-MCNC: 2.5 MG/DL (ref 2.3–4.7)
PISA AR MAX VEL: 4.31 M/S
PLATELET # BLD AUTO: 180 X10(3)/MCL (ref 130–400)
PLATELET # BLD AUTO: 204 X10(3)/MCL (ref 130–400)
PLATELET # BLD AUTO: 219 X10(3)/MCL (ref 130–400)
PLATELET # BLD EST: NORMAL 10*3/UL
PMV BLD AUTO: 10.2 FL (ref 7.4–10.4)
PMV BLD AUTO: 9.3 FL (ref 7.4–10.4)
PMV BLD AUTO: 9.6 FL (ref 7.4–10.4)
POTASSIUM SERPL-SCNC: 4 MMOL/L (ref 3.5–5.1)
POTASSIUM SERPL-SCNC: 4 MMOL/L (ref 3.5–5.1)
POTASSIUM SERPL-SCNC: 5 MMOL/L (ref 3.5–5.1)
PROT SERPL-MCNC: 5.2 GM/DL (ref 5.8–7.6)
PROT SERPL-MCNC: 6.8 GM/DL (ref 5.8–7.6)
PROT UR QL STRIP.AUTO: ABNORMAL MG/DL
PROTHROMBIN TIME: 13.1 SECONDS (ref 12.5–14.5)
PV PEAK VELOCITY: 0.71 CM/S
RBC # BLD AUTO: 5.4 X10(6)/MCL (ref 4.7–6.1)
RBC # BLD AUTO: 5.72 X10(6)/MCL (ref 4.7–6.1)
RBC # BLD AUTO: 6.42 X10(6)/MCL (ref 4.7–6.1)
RBC #/AREA URNS AUTO: 33 /HPF
RBC UR QL AUTO: ABNORMAL UNIT/L
RIGHT CCA DIST DIAS: 5 CM/S
RIGHT CCA DIST SYS: 27 CM/S
RIGHT CCA PROX DIAS: 4 CM/S
RIGHT CCA PROX SYS: 36 CM/S
RIGHT ECA DIAS: 5 CM/S
RIGHT ECA SYS: 40 CM/S
RIGHT ICA DIST DIAS: 9 CM/S
RIGHT ICA DIST SYS: 33 CM/S
RIGHT ICA MID DIAS: 10 CM/S
RIGHT ICA MID SYS: 38 CM/S
RIGHT ICA PROX DIAS: 9 CM/S
RIGHT ICA PROX SYS: 30 CM/S
RIGHT VENTRICULAR END-DIASTOLIC DIMENSION: 2.68 CM
RIGHT VERTEBRAL DIAS: 4 CM/S
RIGHT VERTEBRAL SYS: 38 CM/S
SARS-COV-2 RNA RESP QL NAA+PROBE: NOT DETECTED
SODIUM SERPL-SCNC: 139 MMOL/L (ref 136–145)
SODIUM SERPL-SCNC: 139 MMOL/L (ref 136–145)
SODIUM SERPL-SCNC: 141 MMOL/L (ref 136–145)
SP GR UR STRIP.AUTO: 1.03 (ref 1–1.03)
SPECIFIC GRAVITY, URINE AUTO (.000) (OHS): 1.01 (ref 1–1.03)
SQUAMOUS #/AREA URNS AUTO: 6 /HPF
TDI LATERAL: 0.06 M/S
TDI SEPTAL: 0.06 M/S
TDI: 0.06 M/S
TRICUSPID ANNULAR PLANE SYSTOLIC EXCURSION: 2.22 CM
TRIGL SERPL-MCNC: 70 MG/DL (ref 34–140)
TROPONIN I SERPL-MCNC: 0.01 NG/ML (ref 0–0.04)
TSH SERPL-ACNC: 1.58 UIU/ML (ref 0.35–4.94)
UROBILINOGEN UR STRIP-ACNC: 1 MG/DL
VLDLC SERPL CALC-MCNC: 14 MG/DL
WBC # SPEC AUTO: 4.2 X10(3)/MCL (ref 4.5–11.5)
WBC # SPEC AUTO: 4.4 X10(3)/MCL (ref 4.5–11.5)
WBC # SPEC AUTO: 4.9 X10(3)/MCL (ref 4.5–11.5)
WBC #/AREA URNS AUTO: 50 /HPF

## 2022-01-01 PROCEDURE — 85025 COMPLETE CBC W/AUTO DIFF WBC: CPT | Performed by: PHYSICIAN ASSISTANT

## 2022-01-01 PROCEDURE — 80061 LIPID PANEL: CPT | Performed by: NURSE PRACTITIONER

## 2022-01-01 PROCEDURE — 25000003 PHARM REV CODE 250: Performed by: NURSE PRACTITIONER

## 2022-01-01 PROCEDURE — 25000003 PHARM REV CODE 250: Performed by: INTERNAL MEDICINE

## 2022-01-01 PROCEDURE — 86141 C-REACTIVE PROTEIN HS: CPT | Performed by: NURSE PRACTITIONER

## 2022-01-01 PROCEDURE — 83735 ASSAY OF MAGNESIUM: CPT | Performed by: PHYSICIAN ASSISTANT

## 2022-01-01 PROCEDURE — 21400001 HC TELEMETRY ROOM

## 2022-01-01 PROCEDURE — 63600175 PHARM REV CODE 636 W HCPCS: Performed by: EMERGENCY MEDICINE

## 2022-01-01 PROCEDURE — 99233 PR SUBSEQUENT HOSPITAL CARE,LEVL III: ICD-10-PCS | Mod: GT,,, | Performed by: PSYCHIATRY & NEUROLOGY

## 2022-01-01 PROCEDURE — 25500020 PHARM REV CODE 255: Performed by: INTERNAL MEDICINE

## 2022-01-01 PROCEDURE — 83036 HEMOGLOBIN GLYCOSYLATED A1C: CPT | Performed by: PHYSICIAN ASSISTANT

## 2022-01-01 PROCEDURE — 85027 COMPLETE CBC AUTOMATED: CPT | Performed by: INTERNAL MEDICINE

## 2022-01-01 PROCEDURE — 36415 COLL VENOUS BLD VENIPUNCTURE: CPT | Performed by: PHYSICIAN ASSISTANT

## 2022-01-01 PROCEDURE — 0241U COVID/FLU A&B PCR: CPT | Performed by: PHYSICIAN ASSISTANT

## 2022-01-01 PROCEDURE — 83735 ASSAY OF MAGNESIUM: CPT | Performed by: NURSE PRACTITIONER

## 2022-01-01 PROCEDURE — 84100 ASSAY OF PHOSPHORUS: CPT | Performed by: NURSE PRACTITIONER

## 2022-01-01 PROCEDURE — 87040 BLOOD CULTURE FOR BACTERIA: CPT | Performed by: PHYSICIAN ASSISTANT

## 2022-01-01 PROCEDURE — 36415 COLL VENOUS BLD VENIPUNCTURE: CPT | Performed by: INTERNAL MEDICINE

## 2022-01-01 PROCEDURE — 99223 PR INITIAL HOSPITAL CARE,LEVL III: ICD-10-PCS | Mod: ,,, | Performed by: PSYCHIATRY & NEUROLOGY

## 2022-01-01 PROCEDURE — 99223 1ST HOSP IP/OBS HIGH 75: CPT | Mod: ,,, | Performed by: PSYCHIATRY & NEUROLOGY

## 2022-01-01 PROCEDURE — 80048 BASIC METABOLIC PNL TOTAL CA: CPT | Performed by: INTERNAL MEDICINE

## 2022-01-01 PROCEDURE — 36415 COLL VENOUS BLD VENIPUNCTURE: CPT | Performed by: NURSE PRACTITIONER

## 2022-01-01 PROCEDURE — 93010 EKG 12-LEAD: ICD-10-PCS | Mod: ,,, | Performed by: STUDENT IN AN ORGANIZED HEALTH CARE EDUCATION/TRAINING PROGRAM

## 2022-01-01 PROCEDURE — 85027 COMPLETE CBC AUTOMATED: CPT | Performed by: NURSE PRACTITIONER

## 2022-01-01 PROCEDURE — 63700000 PHARM REV CODE 250 ALT 637 W/O HCPCS: Performed by: INTERNAL MEDICINE

## 2022-01-01 PROCEDURE — 11000001 HC ACUTE MED/SURG PRIVATE ROOM

## 2022-01-01 PROCEDURE — 93005 ELECTROCARDIOGRAM TRACING: CPT

## 2022-01-01 PROCEDURE — 95819 EEG AWAKE AND ASLEEP: CPT

## 2022-01-01 PROCEDURE — 95816 PR EEG,W/AWAKE & DROWSY RECORD: ICD-10-PCS | Mod: 26,,, | Performed by: PSYCHIATRY & NEUROLOGY

## 2022-01-01 PROCEDURE — 25000003 PHARM REV CODE 250: Performed by: EMERGENCY MEDICINE

## 2022-01-01 PROCEDURE — 85651 RBC SED RATE NONAUTOMATED: CPT | Performed by: NURSE PRACTITIONER

## 2022-01-01 PROCEDURE — 25000003 PHARM REV CODE 250

## 2022-01-01 PROCEDURE — 96361 HYDRATE IV INFUSION ADD-ON: CPT

## 2022-01-01 PROCEDURE — 92523 SPEECH SOUND LANG COMPREHEN: CPT

## 2022-01-01 PROCEDURE — 95816 EEG AWAKE AND DROWSY: CPT | Mod: 26,,, | Performed by: PSYCHIATRY & NEUROLOGY

## 2022-01-01 PROCEDURE — 63600175 PHARM REV CODE 636 W HCPCS: Performed by: NURSE PRACTITIONER

## 2022-01-01 PROCEDURE — 85730 THROMBOPLASTIN TIME PARTIAL: CPT | Performed by: PHYSICIAN ASSISTANT

## 2022-01-01 PROCEDURE — 99233 SBSQ HOSP IP/OBS HIGH 50: CPT | Mod: GT,,, | Performed by: PSYCHIATRY & NEUROLOGY

## 2022-01-01 PROCEDURE — 83605 ASSAY OF LACTIC ACID: CPT | Performed by: PHYSICIAN ASSISTANT

## 2022-01-01 PROCEDURE — 84484 ASSAY OF TROPONIN QUANT: CPT | Performed by: PHYSICIAN ASSISTANT

## 2022-01-01 PROCEDURE — 87088 URINE BACTERIA CULTURE: CPT | Performed by: PHYSICIAN ASSISTANT

## 2022-01-01 PROCEDURE — A9577 INJ MULTIHANCE: HCPCS | Performed by: INTERNAL MEDICINE

## 2022-01-01 PROCEDURE — 80053 COMPREHEN METABOLIC PANEL: CPT | Performed by: NURSE PRACTITIONER

## 2022-01-01 PROCEDURE — 97162 PT EVAL MOD COMPLEX 30 MIN: CPT

## 2022-01-01 PROCEDURE — 80053 COMPREHEN METABOLIC PANEL: CPT | Performed by: PHYSICIAN ASSISTANT

## 2022-01-01 PROCEDURE — 96374 THER/PROPH/DIAG INJ IV PUSH: CPT

## 2022-01-01 PROCEDURE — 85025 COMPLETE CBC W/AUTO DIFF WBC: CPT | Performed by: NURSE PRACTITIONER

## 2022-01-01 PROCEDURE — 97166 OT EVAL MOD COMPLEX 45 MIN: CPT

## 2022-01-01 PROCEDURE — 81001 URINALYSIS AUTO W/SCOPE: CPT | Performed by: PHYSICIAN ASSISTANT

## 2022-01-01 PROCEDURE — 99285 EMERGENCY DEPT VISIT HI MDM: CPT | Mod: 25

## 2022-01-01 PROCEDURE — 80307 DRUG TEST PRSMV CHEM ANLYZR: CPT | Performed by: PHYSICIAN ASSISTANT

## 2022-01-01 PROCEDURE — 85610 PROTHROMBIN TIME: CPT | Performed by: PHYSICIAN ASSISTANT

## 2022-01-01 PROCEDURE — 81003 URINALYSIS AUTO W/O SCOPE: CPT | Performed by: PHYSICIAN ASSISTANT

## 2022-01-01 PROCEDURE — 84443 ASSAY THYROID STIM HORMONE: CPT | Performed by: NURSE PRACTITIONER

## 2022-01-01 PROCEDURE — 93010 ELECTROCARDIOGRAM REPORT: CPT | Mod: ,,, | Performed by: STUDENT IN AN ORGANIZED HEALTH CARE EDUCATION/TRAINING PROGRAM

## 2022-01-01 RX ORDER — SODIUM CHLORIDE 9 MG/ML
INJECTION, SOLUTION INTRAVENOUS CONTINUOUS
Status: DISCONTINUED | OUTPATIENT
Start: 2022-01-01 | End: 2022-01-01 | Stop reason: HOSPADM

## 2022-01-01 RX ORDER — LEVETIRACETAM 500 MG/1
500 TABLET ORAL 2 TIMES DAILY
Status: DISCONTINUED | OUTPATIENT
Start: 2022-01-01 | End: 2022-01-01 | Stop reason: HOSPADM

## 2022-01-01 RX ORDER — LEVETIRACETAM 500 MG/1
500 TABLET ORAL 2 TIMES DAILY
Qty: 60 TABLET | Refills: 3 | Status: SHIPPED | OUTPATIENT
Start: 2022-01-01

## 2022-01-01 RX ORDER — ACETAMINOPHEN 325 MG/1
650 TABLET ORAL EVERY 6 HOURS PRN
Status: DISCONTINUED | OUTPATIENT
Start: 2022-01-01 | End: 2022-01-01 | Stop reason: HOSPADM

## 2022-01-01 RX ORDER — LEVETIRACETAM 500 MG/5ML
1000 INJECTION, SOLUTION, CONCENTRATE INTRAVENOUS
Status: COMPLETED | OUTPATIENT
Start: 2022-01-01 | End: 2022-01-01

## 2022-01-01 RX ORDER — CARVEDILOL 3.12 MG/1
3.12 TABLET ORAL 2 TIMES DAILY WITH MEALS
Status: DISCONTINUED | OUTPATIENT
Start: 2022-01-01 | End: 2022-01-01 | Stop reason: HOSPADM

## 2022-01-01 RX ORDER — CLOPIDOGREL BISULFATE 75 MG/1
75 TABLET ORAL DAILY
Status: DISCONTINUED | OUTPATIENT
Start: 2022-01-01 | End: 2022-01-01 | Stop reason: HOSPADM

## 2022-01-01 RX ORDER — ASPIRIN 325 MG
325 TABLET, DELAYED RELEASE (ENTERIC COATED) ORAL
Status: COMPLETED | OUTPATIENT
Start: 2022-01-01 | End: 2022-01-01

## 2022-01-01 RX ORDER — LABETALOL HYDROCHLORIDE 5 MG/ML
10 INJECTION, SOLUTION INTRAVENOUS
Status: DISCONTINUED | OUTPATIENT
Start: 2022-01-01 | End: 2022-01-01 | Stop reason: HOSPADM

## 2022-01-01 RX ORDER — PANTOPRAZOLE SODIUM 40 MG/1
40 TABLET, DELAYED RELEASE ORAL DAILY
Status: DISCONTINUED | OUTPATIENT
Start: 2022-01-01 | End: 2022-01-01 | Stop reason: HOSPADM

## 2022-01-01 RX ORDER — ONDANSETRON 2 MG/ML
4 INJECTION INTRAMUSCULAR; INTRAVENOUS EVERY 4 HOURS PRN
Status: DISCONTINUED | OUTPATIENT
Start: 2022-01-01 | End: 2022-01-01 | Stop reason: HOSPADM

## 2022-01-01 RX ORDER — ENOXAPARIN SODIUM 100 MG/ML
40 INJECTION SUBCUTANEOUS EVERY 24 HOURS
Status: DISCONTINUED | OUTPATIENT
Start: 2022-01-01 | End: 2022-01-01 | Stop reason: HOSPADM

## 2022-01-01 RX ORDER — FLUCONAZOLE 100 MG/1
100 TABLET ORAL DAILY
Status: DISCONTINUED | OUTPATIENT
Start: 2022-01-01 | End: 2022-01-01 | Stop reason: HOSPADM

## 2022-01-01 RX ORDER — AMLODIPINE BESYLATE 5 MG/1
5 TABLET ORAL DAILY
Status: DISCONTINUED | OUTPATIENT
Start: 2022-01-01 | End: 2022-01-01 | Stop reason: HOSPADM

## 2022-01-01 RX ORDER — ATORVASTATIN CALCIUM 10 MG/1
20 TABLET, FILM COATED ORAL DAILY
Status: DISCONTINUED | OUTPATIENT
Start: 2022-01-01 | End: 2022-01-01 | Stop reason: HOSPADM

## 2022-01-01 RX ORDER — DONEPEZIL HYDROCHLORIDE 5 MG/1
10 TABLET, FILM COATED ORAL NIGHTLY
Status: DISCONTINUED | OUTPATIENT
Start: 2022-01-01 | End: 2022-01-01 | Stop reason: HOSPADM

## 2022-01-01 RX ORDER — ALBUTEROL SULFATE 90 UG/1
2 AEROSOL, METERED RESPIRATORY (INHALATION) EVERY 6 HOURS PRN
Status: DISCONTINUED | OUTPATIENT
Start: 2022-01-01 | End: 2022-01-01 | Stop reason: HOSPADM

## 2022-01-01 RX ADMIN — LEVETIRACETAM 500 MG: 500 TABLET, FILM COATED ORAL at 08:11

## 2022-01-01 RX ADMIN — GADOBENATE DIMEGLUMINE 12 ML: 529 INJECTION, SOLUTION INTRAVENOUS at 12:10

## 2022-01-01 RX ADMIN — SACUBITRIL AND VALSARTAN 1 TABLET: 49; 51 TABLET, FILM COATED ORAL at 09:11

## 2022-01-01 RX ADMIN — CARVEDILOL 3.12 MG: 3.12 TABLET, FILM COATED ORAL at 07:11

## 2022-01-01 RX ADMIN — SODIUM CHLORIDE: 9 INJECTION, SOLUTION INTRAVENOUS at 06:10

## 2022-01-01 RX ADMIN — FLUCONAZOLE 100 MG: 100 TABLET ORAL at 02:10

## 2022-01-01 RX ADMIN — SACUBITRIL AND VALSARTAN 1 TABLET: 49; 51 TABLET, FILM COATED ORAL at 08:11

## 2022-01-01 RX ADMIN — SACUBITRIL AND VALSARTAN 1 TABLET: 49; 51 TABLET, FILM COATED ORAL at 09:10

## 2022-01-01 RX ADMIN — AMLODIPINE BESYLATE 5 MG: 5 TABLET ORAL at 08:11

## 2022-01-01 RX ADMIN — DONEPEZIL HYDROCHLORIDE 10 MG: 5 TABLET ORAL at 08:11

## 2022-01-01 RX ADMIN — PANTOPRAZOLE SODIUM 40 MG: 40 TABLET, DELAYED RELEASE ORAL at 08:11

## 2022-01-01 RX ADMIN — CLOPIDOGREL BISULFATE 75 MG: 75 TABLET ORAL at 02:10

## 2022-01-01 RX ADMIN — ENOXAPARIN SODIUM 40 MG: 40 INJECTION SUBCUTANEOUS at 05:11

## 2022-01-01 RX ADMIN — SODIUM CHLORIDE, POTASSIUM CHLORIDE, SODIUM LACTATE AND CALCIUM CHLORIDE 1000 ML: 600; 310; 30; 20 INJECTION, SOLUTION INTRAVENOUS at 11:10

## 2022-01-01 RX ADMIN — LEVETIRACETAM 1000 MG: 100 INJECTION, SOLUTION INTRAVENOUS at 09:10

## 2022-01-01 RX ADMIN — CARVEDILOL 3.12 MG: 3.12 TABLET, FILM COATED ORAL at 04:10

## 2022-01-01 RX ADMIN — ASPIRIN 325 MG: 325 TABLET, COATED ORAL at 05:10

## 2022-01-01 RX ADMIN — DONEPEZIL HYDROCHLORIDE 10 MG: 5 TABLET ORAL at 09:10

## 2022-01-01 RX ADMIN — CARVEDILOL 3.12 MG: 3.12 TABLET, FILM COATED ORAL at 08:11

## 2022-01-01 RX ADMIN — CLOPIDOGREL BISULFATE 75 MG: 75 TABLET ORAL at 08:11

## 2022-01-01 RX ADMIN — ATORVASTATIN CALCIUM 20 MG: 10 TABLET, FILM COATED ORAL at 02:10

## 2022-01-01 RX ADMIN — AMLODIPINE BESYLATE 5 MG: 5 TABLET ORAL at 02:10

## 2022-01-01 RX ADMIN — ATORVASTATIN CALCIUM 20 MG: 10 TABLET, FILM COATED ORAL at 09:11

## 2022-01-01 RX ADMIN — AMLODIPINE BESYLATE 5 MG: 5 TABLET ORAL at 09:11

## 2022-01-01 RX ADMIN — PANTOPRAZOLE SODIUM 40 MG: 40 TABLET, DELAYED RELEASE ORAL at 09:11

## 2022-01-01 RX ADMIN — PANTOPRAZOLE SODIUM 40 MG: 40 TABLET, DELAYED RELEASE ORAL at 02:10

## 2022-01-01 RX ADMIN — FLUCONAZOLE 100 MG: 100 TABLET ORAL at 09:11

## 2022-01-01 RX ADMIN — CARVEDILOL 3.12 MG: 3.12 TABLET, FILM COATED ORAL at 05:11

## 2022-01-01 RX ADMIN — ENOXAPARIN SODIUM 40 MG: 40 INJECTION SUBCUTANEOUS at 04:10

## 2022-01-01 RX ADMIN — CLOPIDOGREL BISULFATE 75 MG: 75 TABLET ORAL at 09:11

## 2022-01-01 RX ADMIN — ATORVASTATIN CALCIUM 20 MG: 10 TABLET, FILM COATED ORAL at 08:11

## 2022-01-01 RX ADMIN — SODIUM CHLORIDE: 9 INJECTION, SOLUTION INTRAVENOUS at 09:10

## 2022-01-01 RX ADMIN — SODIUM CHLORIDE: 9 INJECTION, SOLUTION INTRAVENOUS at 02:11

## 2022-01-01 RX ADMIN — FLUCONAZOLE 100 MG: 100 TABLET ORAL at 08:11

## 2022-03-16 ENCOUNTER — HISTORICAL (OUTPATIENT)
Dept: RADIATION THERAPY | Facility: HOSPITAL | Age: 82
End: 2022-03-16

## 2022-03-31 ENCOUNTER — HISTORICAL (OUTPATIENT)
Dept: HEMATOLOGY/ONCOLOGY | Facility: CLINIC | Age: 82
End: 2022-03-31

## 2022-03-31 LAB
ABS NEUT (OLG): 2.97 (ref 2.1–9.2)
ALBUMIN SERPL-MCNC: 3.5 G/DL (ref 3.4–4.8)
ALBUMIN/GLOB SERPL: 1.2 {RATIO} (ref 1.1–2)
ALP SERPL-CCNC: 80 U/L (ref 40–150)
ALT SERPL-CCNC: 13 U/L (ref 0–55)
AST SERPL-CCNC: 17 U/L (ref 5–34)
BASOPHILS # BLD AUTO: 0 10*3/UL (ref 0–0.2)
BASOPHILS NFR BLD AUTO: 0.5 %
BILIRUB SERPL-MCNC: 0.7 MG/DL
BILIRUBIN DIRECT+TOT PNL SERPL-MCNC: 0.3 (ref 0–0.8)
BILIRUBIN DIRECT+TOT PNL SERPL-MCNC: 0.4 (ref 0–0.5)
BUN SERPL-MCNC: 16.2 MG/DL (ref 8.4–25.7)
CALCIUM SERPL-MCNC: 9 MG/DL (ref 8.7–10.5)
CHLORIDE SERPL-SCNC: 109 MMOL/L (ref 98–107)
CO2 SERPL-SCNC: 25 MMOL/L (ref 23–31)
CREAT SERPL-MCNC: 1.3 MG/DL (ref 0.73–1.18)
EOSINOPHIL # BLD AUTO: 0.2 10*3/UL (ref 0–0.9)
EOSINOPHIL NFR BLD AUTO: 2.7 %
ERYTHROCYTE [DISTWIDTH] IN BLOOD BY AUTOMATED COUNT: 15.3 % (ref 11.5–17)
GLOBULIN SER-MCNC: 3 G/DL (ref 2.4–3.5)
GLUCOSE SERPL-MCNC: 92 MG/DL (ref 82–115)
HCT VFR BLD AUTO: 46.1 % (ref 42–52)
HEMOLYSIS INTERF INDEX SERPL-ACNC: 2
HGB BLD-MCNC: 14 G/DL (ref 14–18)
ICTERIC INTERF INDEX SERPL-ACNC: 1
LIPEMIC INTERF INDEX SERPL-ACNC: 0
LYMPHOCYTES # BLD AUTO: 1.4 10*3/UL (ref 0.6–4.6)
LYMPHOCYTES NFR BLD AUTO: 25.3 %
MANUAL DIFF? (OHS): NO
MCH RBC QN AUTO: 23.3 PG (ref 27–31)
MCHC RBC AUTO-ENTMCNC: 30.4 G/DL (ref 33–36)
MCV RBC AUTO: 76.6 FL (ref 80–94)
MONOCYTES # BLD AUTO: 0.9 10*3/UL (ref 0.1–1.3)
MONOCYTES NFR BLD AUTO: 16.8 %
NEUTROPHILS # BLD AUTO: 3 10*3/UL (ref 2.1–9.2)
NEUTROPHILS NFR BLD AUTO: 54.5 %
PLATELET # BLD AUTO: 234 10*3/UL (ref 130–400)
PMV BLD AUTO: 9.4 FL (ref 9.4–12.4)
POTASSIUM SERPL-SCNC: 4.2 MMOL/L (ref 3.5–5.1)
PREALB SERPL-MCNC: 22.5 (ref 16–42)
PROT SERPL-MCNC: 6.5 G/DL (ref 5.8–7.6)
RBC # BLD AUTO: 6.02 10*6/UL (ref 4.7–6.1)
SODIUM SERPL-SCNC: 142 MMOL/L (ref 136–145)
WBC # SPEC AUTO: 5.5 10*3/UL (ref 4.5–11.5)

## 2022-04-10 ENCOUNTER — HISTORICAL (OUTPATIENT)
Dept: ADMINISTRATIVE | Facility: HOSPITAL | Age: 82
End: 2022-04-10
Payer: MEDICAID

## 2022-04-28 VITALS
BODY MASS INDEX: 22.19 KG/M2 | WEIGHT: 133.19 LBS | HEIGHT: 65 IN | DIASTOLIC BLOOD PRESSURE: 82 MMHG | SYSTOLIC BLOOD PRESSURE: 126 MMHG

## 2022-04-30 NOTE — ED PROVIDER NOTES
Patient:   Ezequiel Merchant            MRN: [id number]            FIN: [id number]               Age:   80 years     Sex:  Male     :  1940   Associated Diagnoses:   Dysphagia   Author:   Ryan Ch MD      Basic Information   Time seen: Date & time 2021 16:07:00.   History source: Patient, family.   Arrival mode: Wheelchair.   History limitation: None.   Additional information: Patient's physician(s): Jamar Lenz DO      History of Present Illness   The patient presents with the sensation of a foreign body in the throat, 81 y/o M presents to the ED with food bolus after eating rice and gravy PTA. States he has had this before, however it passed on its own. Denies SOB. On thinners. BRUNILDA Powell and   I, Dr. Ayers, assumed care of this patient upon walking into the room at 2120.    81 y/o AAM with a history of dementia, HTN, MI, and CAD on Xarelto presents to the ED with family at bedside complaining of difficulty swallowing and foreign body sensation onset 1200 today. The pt's family at bedside reports that the pt choked on a piece of meat and he still feels a FB sensation. The pt admits that he is able to swallow his saliva, but he is unable to swallow liquids without pain. He notes that the FB feels like it is located at the top of his esophagus. The pt has experienced this issue in the past, but the FB passed on its own within a few minutes. Of note, the pt is not spitting in an emesis bag and appears to be tolerating his secretions well. The pt also states that he has had recent weight loss from a size 34 to a 32 in pants. Denies abdominal pain or history of neck mass.  .  The onset was 1200.  The course/duration of symptoms is constant.  Symptoms: difficulty swallowing and tolerating secretions.  Location: Upper esophagus. The type of foreign body is meat.  The degree of symptoms is minimal.  The exacerbating factor is drinking.  The relieving factor is none.   Risk factors consist of hypertension.  Prior episodes: occasional.  Therapy today: none.  Associated symptoms: none.        Review of Systems   Constitutional symptoms:  Weight loss   Skin symptoms:  Negative except as documented in HPI   Eye symptoms:  Negative except as documented in HPI   ENMT symptoms:  Throat: Pain, difficulty swallowing.   Respiratory symptoms:  Negative except as documented in HPI   Cardiovascular symptoms:  Negative except as documented in HPI   Gastrointestinal symptoms:     Negative except as documented in HPIMusculoskeletal symptoms:  Negative except as documented in HPI.   Neurologic symptoms:  Negative except as documented in HPI   Psychiatric symptoms:  Negative except as documented in HPI.             Additional review of systems information: All other systems reviewed and otherwise negative.      Health Status   Allergies: No known allergies.   Medications:  (Selected)   Inpatient Medications  Pending Complete  flurbiprofen ophthalmic: 1 drop(s), form: Soln-Opth, Eye-Right, q30min, Order duration: 4 dose(s), first dose 11/28/12 11:30:00 CST, stop date 11/28/12 13:29:00 CST, on arrival and every 30 minutes x 4 doses  Prescriptions  Prescribed  Xarelto 10mg Tablet: 10 mg = 1 tab(s), Oral, Daily, # 30 tab(s), 0 Refill(s), Pharmacy: [facility], 165.1, cm, Height/Length Dosing, 07/21/20 13:11:00 CDT, 54.9, kg, Weight Dosing, 07/21/20 13:11:00 CDT  omeprazole 20 mg oral DR capsule: See Instructions, TAKE TWO CAPSULES BY MOUTH EVERY DAY, # 60 cap(s), 0 Refill(s), Pharmacy: [id number], 165, cm, Height/Length Dosing, 12/30/20 8:11:00 CST, 59.7, kg, Weight Dosing, 12/30/20 8:11:00 CST  Documented Medications  Documented  AZITHROMYCIN TAB 250MG: Oral, As Directed  Diflucan 100 mg oral tablet: 100 mg = 1 tab(s), Oral, Daily  Entresto 49 mg-51 mg oral tablet: 1 tab(s), Oral, BID  FLOVENT HFA  AER 110MCG: See Instructions, PRN PRN shortness of breath or wheezing,  "220 mcg INH  Flovent  mcg/inh inhalation aerosol: 2 puff(s), INH, BID  HYDROCO/APAP TAB 10-325M tab(s), Oral, TID  HYDROCO/APAP TAB 7.5-325: 1 tab(s), Oral, QID  HYDROCODONE-HOMATROPINE SYRUP: 5 mL, Oral, q4hr  Lipitor 20 mg oral tablet: 20 mg = 1 tab(s), Oral, Daily  Plavix 75 mg oral tablet: 75 mg = 1 tab(s), Oral, Daily  ProAir HFA 90 mcg/inh inhalation aerosol with adapter: 1 puff(s), INH, q4hr, PRN PRN for wheezing, 0 Refill(s)  TRIAMCINOLON CRE 0.1%: 1 mahnaz, TOP, TID  carvedilol 3.125 mg oral tablet: 3.125 mg = 1 tab(s), Oral, BID  donepezil 10 mg oral tablet: 10 mg = 1 tab(s), Oral, Daily, # 30 tab(s), 0 Refill(s)  fluconazole 100 mg oral tablet: 100 mg = 1 tab(s), Oral, Daily  lisinopril 5 mg oral tablet: 10 mg = 2 tab(s), Oral, Daily, # 30 tab(s), 0 Refill(s)  meloxicam 7.5 mg oral tablet: 7.5 mg = 1 tab(s), Oral, Daily, # 30 tab(s), 0 Refill(s)  nystatin 100,000 units/mL oral suspension: [id number] units = 4 mL, Oral, QID.      Past Medical/ Family/ Social History   Medical history:    Resolved  Cataract ([id number]):  Resolved.  Hypertension ([id number]):  Resolved.  Heart disease ([id number]):  Resolved.  Myocardial infarction ([id number]):  Resolved.  Comments:  2012 CST 3:39 Jacklyn Garcia RN  2012 CST 16:02 CST - David LOU, Ginna LORD  Shortness of breath ([id number]):  Resolved.  Comments:  2012 CST 3:40 Jacklyn Garcia RN  ok to lie flat  Reflux ([id number]):  Resolved.  Prostate cancer ([id number]):  Resolved.  Comments:  2012 CST 3:40 TYREE Diaz RN, Jacklyn IRBY.  "a few years ago"  Pete ([id number]):  Resolved.  Comments:  2012 CST 3:41 Jacklyn Garcia RN.  for ambulation  Arthritis ([id number]):  Resolved.  Chronic back pain ([id number]):  Resolved.  Comments:  2012 CST 16:05 TYREE Hernandez RN, Ginna LEE  BACK,LEGS,&ARTHRITIS  Forgetful ([id number]):  Resolved.  Numbness ([id number]):  " Resolved.  Comments:  11/24/2012 CST 3:42 Jacklyn Garcia RN  numbness, tingling  Pain ([id number]):  Resolved.  Comments:  11/24/2012 CST 3:43 Jacklyn Garcia RN  arthritis pain, back, and leg pain  smoker ([id number]):  Resolved.,   Dementia  Lung cancer.   Surgical history:    Debridement Foot (Left) on 9/23/2016 at 76 Years.  Comments:  9/23/2016 10:08 CDT - Salinas Lewis CRNA  auto-populated from documented surgical case  Amputation Toe (Left) on 5/29/2015 at 74 Years.  Comments:  5/29/2015 14:24 CDT - Baldemar Cespedes RN  auto-populated from documented surgical case  CARDIAC STENTS in the month of 1/2012 at 71 Years.  back surgery..   Family history: Not significant.   Social history: Alcohol use: Regularly, Tobacco use: Regularly, Drug use: Denies.      Physical Examination               Vital Signs   Vital Signs   7/14/2021 21:25 CDT      Temperature Oral          36.4 DegC                             Temperature Oral (calculated)             97.52 DegF                             Peripheral Pulse Rate     56 bpm  LOW                             Respiratory Rate          18 br/min                             SpO2                      95 %                             Oxygen Therapy            Room air                             Systolic Blood Pressure   114 mmHg                             Diastolic Blood Pressure  73 mmHg  .   Basic Oxygen Information   7/14/2021 21:25 CDT      SpO2                      95 %                             Oxygen Therapy            Room air  General:  Alert, no acute distress, well appearing, conversant; not spitting in a bag; appears to be tolerating secretions well; not drooling   Neurological:  Alert and oriented to person, place, time, and situation, No focal neurological deficit observed, CN II-XII intact, normal sensory observed, normal motor observed, normal speech observed, normal coordination observed.    Skin:  Warm, dry, intact   Head:   Normocephalic, atraumatic   Neck:  Supple, trachea midline, no obvious mass in neck on palpation   Eye:  Pupils are equal, round and reactive to light, extraocular movements are intact, normal conjunctiva   Ears, nose, mouth and throat:  Oral mucosa moist, oropharynx appears normal   Cardiovascular:  Regular rate and rhythm, No murmur, Normal peripheral perfusion, No edema   Respiratory:  Respirations are non-labored, breath sounds are equal, Symmetrical chest wall expansion, Breath sounds: no stridor.    Chest wall:  No tenderness   Musculoskeletal:  Normal ROM, normal strength, no tenderness, no swelling, no deformity.    Gastrointestinal:  Soft, Nontender, Non distended   Psychiatric:  Cooperative, appropriate mood & affect, normal judgment      Medical Decision Making   Documents reviewed:  Emergency department nurses' notes.   Orders  Launch Orders   Laboratory:  PTT (Order): Stat collect, 7/14/2021 16:08 CDT, Blood, Lab Collect, Print Label By Order Location, 7/14/2021 16:08 CDT  PT (Order): Stat collect, 7/14/2021 16:08 CDT, Blood, Lab Collect, Print Label By Order Location, 7/14/2021 16:08 CDT  CBC w/ Auto Diff (Order): Stat collect, 7/14/2021 16:08 CDT, Blood, Lab Collect, Print Label By Order Location, 7/14/2021 16:08 CDT  CMP (Order): Stat collect, 7/14/2021 16:08 CDT, Blood, Lab Collect, Print Label By Order Location, 7/14/2021 16:08 CDT  Pharmacy:  glucagon (Order): 1 mg, form: Injection, IM, As Directed PRN for blood glucose, first dose 7/14/2021 16:08 CDT, STAT, launch Order Profile (Selected)   Inpatient Orders  Ordered  IVF Normal Saline NS Bolus 1000ml 1,000 mL: 1,000 mL, 1,000 mL, IV, 1,000 mL/hr, start date 07/14/21 21:34:00 CDT  glucagon: 1 mg, form: Injection, IM, As Directed PRN for blood glucose, first dose 07/14/21 16:08:00 CDT, STAT  glucagon: 1 mg, form: Injection, IV, As Directed, first dose 07/14/21 20:55:00 CDT, STAT  Ordered (Exam Ordered)  CT Chest Lungs W Contrast: Stat, 07/14/21  21:34:00 CDT, Other (please specify), odynophagia, None, Stretcher, Patient Has IV?, Creatinine if needed per protocol, Rad Type, 07/14/21 21:34:00 CDT  CT Soft Tissue Neck W Contrast: Stat, 07/14/21 21:34:00 CDT, Pain, neck, dysphagia/odynophagia, None, Stretcher, Patient Has IV?, Creatinine if needed per protocol, Rad Type, 07/14/21 21:34:00 CDT  Completed  Automated Diff: STAT collect, 07/14/21 16:38:00 CDT, Blood, Collected, Stop date 07/14/21 16:38:00 CDT, Lab Collect, Print Label By Order Location, 07/14/21 16:08:00 CDT  CBC w/ Auto Diff: STAT collect, 07/14/21 16:38:40 CDT, BLOOD, Collected, Stop date 07/14/21 16:38:00 CDT, Lab Collect  CMP: STAT collect, 07/14/21 16:38:40 CDT, BLOOD, Collected, Stop date 07/14/21 16:38:00 CDT, Lab Collect  Estimated Glomerular Filtration Rate: STAT collect, 07/14/21 16:38:00 CDT, Blood, Collected, Stop date 07/14/21 16:38:00 CDT, Lab Collect, Print Label By Order Location, 07/14/21 16:08:00 CDT  PT: STAT collect, 07/14/21 16:38:40 CDT, BLOOD, Collected, Stop date 07/14/21 16:38:00 CDT, Lab Collect  PTT: STAT collect, 07/14/21 16:38:40 CDT, BLOOD, Collected, Stop date 07/14/21 16:38:00 CDT, Lab Collect  XR Soft Tissue Neck: Stat, 07/14/21 16:09:00 CDT, Other (please specify), food bolus, None, Stretcher, Patient Has IV?, Rad Type, Not Scheduled, 07/14/21 16:09:00 CDT.    Results review:  Lab results : Lab View   7/14/2021 16:38 CDT      Sodium Lvl                144 mmol/L                             Potassium Lvl             4.8 mmol/L                             Chloride                  109 mmol/L  HI                             CO2                       24 mmol/L                             Calcium Lvl               8.9 mg/dL                             Glucose Lvl               84 mg/dL                             BUN                       13.8 mg/dL                             Creatinine                1.61 mg/dL  HI                             eGFR-AA                    54  NA                             eGFR-MANN                  44 mL/min/1.73 m2  NA                             Bili Total                0.7 mg/dL                             Bili Direct               0.3 mg/dL                             Bili Indirect             0.40 mg/dL                             AST                       22 unit/L                             ALT                       19 unit/L                             Alk Phos                  88 unit/L                             Total Protein             6.7 gm/dL                             Albumin Lvl               3.4 gm/dL                             Globulin                  3.3 gm/dL                             A/G Ratio                 1.0 ratio  LOW                             PT                        13.3 second(s)                             INR                       1.0                             PTT                       36.6 second(s)  HI                             WBC                       5.4 x10(3)/mcL                             RBC                       6.24 x10(6)/mcL  HI                             Hgb                       14.7 gm/dL                             Hct                       49.1 %                             Platelet                  250 x10(3)/mcL                             MCV                       78.7 fL  LOW                             MCH                       23.6 pg  LOW                             MCHC                      29.9 gm/dL  LOW                             RDW                       16.7 %                             MPV                       9.8 fL                             Abs Neut                  3.31 x10(3)/mcL                             Neutro Auto               61 %  NA                             Lymph Auto                21 %  NA                             Mono Auto                 15 %  NA                             Eos Auto                  3 %  NA                             Abs Eos                    0.2 x10(3)/mcL                             Basophil Auto             0 %  NA                             Abs Neutro                3.31 x10(3)/mcL                             Abs Lymph                 1.1 x10(3)/mcL                             Abs Mono                  0.8 x10(3)/mcL                             Abs Baso                  0.0 x10(3)/mcL  .   Radiology results:  Rad Results (ST)  < 12 hrs   Accession: [id number]  Order: CT Soft Tissue Neck W Contrast  Report Dt/Tm: 07/14/2021 22:27  Report:      CT of the neck soft tissues with contrast     Technique: Images of the neck soft tissues were obtained with  contrast.     Total DLP: 871 mGy cm      Indication: Dysphasia     Comparison: None.     Findings:   Included intracranial contents are normal. There is no hemorrhage,  hydrocephalus, or midline shift. There is no abnormal intracranial  enhancement. The bilateral orbits are normal. The paranasal sinuses  and mastoid air cells are normally developed and free of disease.     The nasopharynx oropharynx are widely patent. The airway is widely  patent. There is no cervical adenopathy. The parotid and 70 glands are  normal. The thyroid gland is normal.     There is debris within the upper thoracic esophagus best visualized on  series 3 image 82. There is no lytic or blastic osseous lesion.     Impression:  Debris within the upper thoracic esophagus as detailed above.  Differential includes secretions, ingested material, and underlying  mass lesion. Endoscopic evaluation would be beneficial.    Accession: [id number]  Order: CT Thorax W Contrast  Report Dt/Tm: 07/14/2021 22:24  Report:      CT of the chest with contrast     Technique: Images of the chest were obtained with contrast.     Total DLP: 419 mGy cm      Indication: Odynophagia     Comparison: CT of the chest 2/18/2021.     Findings:   The airway is widely patent. There is no mediastinal or hilar  adenopathy. There is no central  pulmonary embolus. The heart is  nonenlarged.     There is minimal atelectasis in the lingula. The lung parenchyma is  otherwise clear. There is no pulmonary nodule or mass. There is no  pleural effusion. There is no pneumothorax. There is no groundglass or  reticulonodular airspace opacity.     The upper abdomen demonstrates no acute abnormality. There is no lytic  or blastic osseous lesion.     Impression:  Minimal atelectasis in the lingula without finding to account for  odynophagia.    Accession: [id number]  Order: XR Soft Tissue Neck  Report Dt/Tm: 07/14/2021 16:41  Report:   XR Soft Tissue Neck     HISTORY: Food bolus     COMPARISON: Radiographs dated 3/3/2020     FINDINGS:  The airways are patent. There is no foreign body or radiopaque food  bolus identified. The epiglottis is normal in thickness. The  retropharyngeal soft tissues are within normal limits.        IMPRESSION:  No radiopaque foreign body or food bolus identified.      .      Reexamination/ Reevaluation   Course: improving.   Pain status: decreased.      Impression and Plan   Diagnosis   Dysphagia ([id number] R13.10)      Calls-Consults   -  7/14/2021 23:08:00 , Hospitalist.    Plan   Disposition: Admit.    Counseled: Patient, Family, Regarding diagnosis, Regarding diagnostic results, Regarding treatment plan, Patient indicated understanding of instructions, Family verbalizes understanding .    Notes: IChetna, acted solely as a scribe for and in the presence of Dr. Ch who performed the service..       Addendum      Teaching-Supervisory Addendum-Brief   Notes: I, Dr. Ch, personally performed the services described in this documentation as scribed in my presence and it is both accurate and complete..

## 2022-04-30 NOTE — H&P
Patient:   Ezequiel Merchant            MRN: [id number]            FIN: [id number]               Age:   79 years     Sex:  Male     :  1940   Associated Diagnoses:   None   Author:   Doris Baires MD      Basic Information   Referral source:  Emergency department.    History limitation:  Clinical condition.       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  Who presented to the ER with complaints of shortness of breath.  Of note patient does have some baseline dementia but is oriented to self person and place, does have difficulty with memory.  Patient endorses a shortness of breath is gone on for months now, the only reason he is in the hospital today  wants to get a checkup considering recently his wife has been diagnosed with COVID 19 and is now in the hospital, he is unable to tell me which hospital she is in but says for a week she has been in that hospital.  Patient shortness of breath like symptoms have essentially been baseline since the last few months, functionally able to walk around his house with a walker with no symptoms of shortness of breath, no orthopnea or PND, not associated cough, denies chest pain, says he has a good appetite, ate pancakes this morning, denies any weight loss.  Patient is unable to tell me all of his medical problems or the medications he takes, says he lives at home with his wife, and their friend, will speak with family member on the phone to further elucidate history.  Denies any complaints of fever.  Discussed CODE STATUS, DNR/DNI, order placed.    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 kim include Norco 10 mg, Entresto 2426, Flovent, omeprazole 20 mg, amlodipine 5, atorvastatin 20, Plavix 75, 20 result 10 mg, Coreg 3.125 twice a day      Review of Systems   Constitutional:  Decreased activity.    Gen: AOx3, No Fever, No Weight Changes  Heart: No chest pains, No palpitations  Lungs: + SOB, No wheezing  GI: No abd pain, No nausea, No vomiting, No diarrhea  : No hematuria, No dysuria  MSK: No LE swelling  Integumentary: No rash, No pruritus       Health Status   Allergies:    Allergic Reactions (Selected)  No Known Allergies,    Allergies (1) Active Reaction  No Known Allergies None Documented     Current medications:  (Selected)   Inpatient Medications  Ordered  Lovenox: 40 mg, form: Injection, Subcutaneous, Daily, first dose 20 12:31:00 CDT, STAT  Normal Saline (0.9% NS) IV 1,000 mL: 1,000 mL, 1,000 mL, IV, 1,000 mL/hr, start date 20 10:50:00 CDT, 1.6, m2  Normal Saline (0.9% NS) IV 1,000 mL: 1,000 mL, 1,000 mL, IV, 1,000 mL/hr, start date 20 9:24:00 CDT, 1.6, m2  Pending Complete  flurbiprofen ophthalmic: 1 drop(s), form: Soln-Opth, Eye-Right, q30min, Order duration: 4 dose(s), first dose 12 11:30:00 CST, stop date 12 13:29:00 CST, on arrival and every 30 minutes x 4 doses  Documented Medications  Documented  AZITHROMYCIN TAB 250MG: Oral, As Directed  FLOVENT HFA  AER 110MCG: See Instructions, PRN PRN shortness of breath or wheezing, 220 mcg INH  HYDROCO/APAP TAB 10-325M tab(s), Oral, TID  HYDROCO/APAP TAB 7.5-325: 1 tab(s), Oral, QID  HYDROCODONE-HOMATROPINE SYRUP: 5 mL, Oral, q4hr  LISINOPRIL   TAB 10MG: 10 mg = 1 tab(s), Oral, Daily  Lipitor 20 mg oral tablet: 20 mg = 1 tab(s), Oral, " Daily  Plavix 75 mg oral tablet: 75 mg = 1 tab(s), Oral, Daily  ProAir HFA 90 mcg/inh inhalation aerosol with adapter: 1 puff(s), INH, q4hr, PRN PRN for wheezing, 0 Refill(s)  TRIAMCINOLON CRE 0.1%: 1 mahnaz, TOP, TID  donepezil 10 mg oral tablet: 10 mg = 1 tab(s), Oral, Daily, # 30 tab(s), 0 Refill(s)  lisinopril 5 mg oral tablet: 10 mg = 2 tab(s), Oral, Daily, # 30 tab(s), 0 Refill(s)  meloxicam 7.5 mg oral tablet: 7.5 mg = 1 tab(s), Oral, Daily, # 30 tab(s), 0 Refill(s)  omeprazole 20 mg oral DR capsule: 20 mg = 1 cap(s), Oral, Daily, 0 Refill(s),    Medications (3) Active  Scheduled: (1)  enoxaparin 40mg/0.4ml Inj  40 mg 0.4 mL, Subcutaneous, Daily  Continuous: (2)  sodium chloride 0.9% 1,000 mL  1,000 mL, IV, 1,000 mL/hr  sodium chloride 0.9% 1,000 mL  1,000 mL, IV, 1,000 mL/hr  PRN: (0)     Problem list:    All Problems  2019-nCoV / SNOMED CT [id number] / Confirmed  Problem added via discern rule sz_covid_pos_neg  CAD in native artery / SNOMED CT [id number] / Confirmed  Dementia / SNOMED CT [id number] / Confirmed  DJD (degenerative joint disease) / SNOMED CT [id number] / Confirmed  GERD (gastroesophageal reflux disease) / SNOMED CT [id number] / Confirmed  Hallux valgus (acquired), unspecified foot / SNOMED CT [id number] / Confirmed  Hammer toe, acquired / SNOMED CT [id number] / Confirmed  Onychomycosis of toenail / SNOMED CT [id number] / Confirmed  PAD (peripheral artery disease) / SNOMED CT [id number] / Confirmed  PVD (peripheral vascular disease) / SNOMED CT [id number] / Confirmed  Tinea pedis / SNOMED CT [id number] / Confirmed  Tobacco user / SNOMED CT 922080048 / Confirmed,    Active Problems (12) 2019-nCoV   CAD in native artery   Dementia   DJD  "(degenerative joint disease)   GERD (gastroesophageal reflux disease)   Hallux valgus (acquired), unspecified foot   Hammer toe, acquired   Onychomycosis of toenail   PAD (peripheral artery disease)   PVD (peripheral vascular disease)   Tinea pedis   Tobacco user         Histories   Past Medical History:    Resolved  Cataract ([id number]):  Resolved.  Hypertension (16320082):  Resolved.  Heart disease (90579380):  Resolved.  Myocardial infarction (42262062):  Resolved.  Comments:  11/24/2012 CST 3:39 Jacklyn Garcia RN  January 2012 11/25/2012 CST 16:02 Ginna Newsome RN, DR  Shortness of breath ([id number]):  Resolved.  Comments:  11/24/2012 CST 3:40 Jacklyn Garcia RN  ok to lie flat  Reflux (18488485):  Resolved.  Prostate cancer ([id number]):  Resolved.  Comments:  11/24/2012 CST 3:40 Jacklyn Garcia RN  "a few years ago"  Cane ([id number]):  Resolved.  Comments:  11/24/2012 CST 3:41 Jacklyn Garcia RN  for ambulation  Arthritis (0785580):  Resolved.  Chronic back pain ([id number]):  Resolved.  Comments:  11/25/2012 CST 16:05 Ginna Newsome RN  BACK,LEGS,&ARTHRITIS  Forgetful (312830718):  Resolved.  Numbness (44600581):  Resolved.  Comments:  11/24/2012 CST 3:42 Jacklyn Garcia RN  numbness, tingling  Pain (02985291):  Resolved.  Comments:  11/24/2012 CST 3:43 Jacklyn Garcia RN  arthritis pain, back, and leg pain  smoker ([id number]):  Resolved.   Procedure history:    Debridement Foot (Left) on 9/23/2016 at 76 Years.  Comments:  9/23/2016 10:08 Salinas Agee CRNA  auto-populated from documented surgical case  Amputation Toe (Left) on 5/29/2015 at 74 Years.  Comments:  5/29/2015 14:24 ALEXANDRA Cespedes RN Baldemar Montanez  auto-populated from documented surgical case  CARDIAC STENTS in the month of 1/2012 at 71 Years.  back surgery.   Social History        Social & Psychosocial Habits    Alcohol  11/24/2012  Use: Current    Type: " Beer    Frequency: 3-5 times per week    Employment/School  11/25/2012  Previous employment/school: DISABLED    Highest education: [employer]    Substance Use  11/25/2012 Risk Assessment: Denies Substance Abuse    10/12/2016  Use: Never    Tobacco  05/26/2012  Use: Current    Type: Cigarettes    Tobacco use per day: 10    Number of years: 47    Started at age: 14.0 Years    Previous treatment: None    07/15/2015 Risk Assessment: High Risk    12/31/2019  Use: 10 or more cigarettes (1/    Patient Wants Consult For Cessation Counseling Yes    03/02/2020  Use: 10 or more cigarettes (1/    Patient Wants Consult For Cessation Counseling No    07/21/2020  Use: 4 or less cigarettes(less    Patient Wants Consult For Cessation Counseling No    Abuse/Neglect  03/02/2020  SHX Any signs of abuse or neglect No    07/21/2020  SHX Any signs of abuse or neglect No  .        Physical Examination   Vital Signs   7/21/2020 11:00 CDT      Peripheral Pulse Rate     75 bpm                             Heart Rate Monitored      78 bpm                             Respiratory Rate          17 br/min                             SpO2                      97 %                             Oxygen Therapy            Room air                             Systolic Blood Pressure   128 mmHg                             Diastolic Blood Pressure  76 mmHg                             Mean Arterial Pressure, Cuff              93 mmHg    7/21/2020 10:33 CDT      Peripheral Pulse Rate     76 bpm                             Respiratory Rate          18 br/min                             SpO2                      99 %                             Oxygen Therapy            Room air                             Systolic Blood Pressure   119 mmHg                             Diastolic Blood Pressure  58 mmHg  LOW                             Mean Arterial Pressure, Cuff              78 mmHg    7/21/2020 10:00 CDT      Peripheral Pulse Rate     76 bpm                              Respiratory Rate          20 br/min                             SpO2                      98 %                             Oxygen Therapy            Room air                             Systolic Blood Pressure   123 mmHg                             Diastolic Blood Pressure  63 mmHg    7/21/2020 8:51 CDT       Temperature Temporal Artery               36.5 DegC                             Peripheral Pulse Rate     75 bpm                             Respiratory Rate          20 br/min                             SpO2                      98 %                             Oxygen Therapy            Room air                             Systolic Blood Pressure   98 mmHg                             Diastolic Blood Pressure  53 mmHg  LOW        Vital Signs (last 24 hrs)_____  Last Charted___________  Heart Rate Peripheral   75 bpm  (JUL 21 11:00)  Resp Rate         17 br/min  (JUL 21 11:00)  SBP      128 mmHg  (JUL 21 11:00)  DBP      76 mmHg  (JUL 21 11:00)  SpO2      97 %  (JUL 21 11:00)  Weight      54.9 kg  (JUL 21 08:51)     Measurements from flowsheet : Measurements   7/21/2020 8:51 CDT       Weight Dosing             54.9 kg                             Weight Measured           54.9 kg                             Weight Measured and Calculated in Lbs     121.03 lb                             Height/Length Dosing      165.10 cm                             Height/Length Estimated   165.10 cm     General: Elderly -American male sitting up in bed seems to be in no apparent distress  H EENT: No JVD or obvious lymphadenopathy  CVS: S1-S2 heard, nontender tachycardic, regular rhythm  RESP: Decreased breath sounds bilaterally with no crackles or wheeze  Abdomen: Soft, nontender, no guarding or rigidity  Extremity: No lower extremity edema  Neuro: Alert oriented to person, place, year, does have slow to recall of recent and remote memory, able to move all 4 limbs spontaneously, sensation intact      Review /  Management   Results review:     Labs (Last four charted values)  WBC                  L 3.2 (JUL 21)   Hgb                  14.5 (JUL 21)   Hct                  47.1 (JUL 21)   Plt                  156 (JUL 21)   Na                   143 (JUL 21)   K                    3.5 (JUL 21)   CO2                  24 (JUL 21)   Cl                   H 110 (JUL 21)   Cr                   H 1.90 (JUL 21)   BUN                  H 26 (JUL 21)   Glucose Random       H 112 (JUL 21) .    Radiology results   Rad Results (ST)   Accession: [id number]  Order: XR Chest 1 View  Report Dt/Tm: 07/21/2020 09:50  Report:   one view of the chest     CPT 30343     HISTORY:  Shortness of Breath     FINDINGS:  Examination reveals mediastinal and cardiac silhouettes to be within  normal limits. Lung fields are clear and free of gross infiltrates  atelectases or effusions     IMPRESSION: No active pulmonary disease          Impression and Plan   1.  AK I  2.  Lactic acidosis  3.  Leukopenia  4.  COVID 19 positive  5.  History of CAD status post PCI  6.  History of lung CA status post XRT 5/18  7.  Possible heart failure  8.  Hypertension  9.  Hyperlipidemia    Patient was admitted for AK I and lactic acidosis, at this time unclear the etiology of which could be from decreased by mouth intake and unable to care for himself since his wife is been in the hospital, will call family members to elicit a further history due to him being forgetful  Was given 2 L fluid in the ER, repeat lactic and CMP pending, considering BNP of 1100 will obtain echocardiogram  EKG reviewed shows T-wave inversions in precordial and inferior leads, will obtain echocardiogram along with trending of troponin EKGs, denies chest pain at this time, unclear if he was compliant with his Plavix  Patient was slightly hypotensive when he came in 98/53 which is now resolved with IV fluids, lactic acidosis could be from that  Blood cultures redrawn at this time do not feel  antibiotics are warranted, no obvious infiltrate on chest x-ray, patient denies fever, and no other obvious source of infection  Considering unclear history regarding lung cancer, will obtain CT of lung without contrast to further look at parenchyma as well as state of tumor, Will order CT abdomen also to look for cause of sepsis  Ordered COVID labs including ESR, ferritin, CRP, LDH, d-dimer was negative, abg O2 69 on RA.    Dispo: Will trend LA and Cr, once stable dc home if appropriate will get with CM, need of HH vs NH.    Attempted multiple times to call pts person on contact, not answering both home or cell.

## 2022-04-30 NOTE — ED PROVIDER NOTES
Patient:   Ezequiel Merchant            MRN: [id number]            FIN: [id number]               Age:   79 years     Sex:  Male     :  1940   Associated Diagnoses:   COVID-19 virus detected; Acidosis, lactic; AMNA (acute kidney injury); Acute hypotension; Shortness of breath   Author:   Jayce Mcnulty      Basic Information   Time seen: Immediately upon arrival.   History source: Patient.   Arrival mode: Private vehicle, walking.   History limitation: None.   Additional information: Chief Complaint from Nursing Triage Note : Chief Complaint   2020 8:51 CDT       Chief Complaint           Pt in with complaints of SOB x 3 days.  .   Provider/Visit info:   Time Seen:  Jayce Mcnulty / 2020 08:55  .   History of Present Illness   The patient presents with SOB.  The onset was 3  days ago.  The course/duration of symptoms is constant.  Degree at onset moderate.  Degree at present moderate.  The Exacerbating factors is exertion.  The Relieving factors is none.  Risk factors consist of coronary artery disease, hypertension, smoking, MI, PAD, not congestive heart failure, not diabetes mellitus, not pulmonary embolism and not deep vein thrombosis.  Prior episodes: occasional.  Therapy today: reports taking all meds as prescribed, has not taken anything for symptomatic treatment of current SOB.  Associated symptoms: Denies sore throat, wheezing, productive cough, rhinorrhea, sinus congestion, palpitations, diaphoresis, tachycardia, LE edema, diarrhea, denies chest pain, denies fever, denies chills, denies cough, denies nausea, denies vomiting, denies abdominal pain, denies back pain, denies weight gain and denies hemoptysis.  Additional history: 80 yo M w/ PMHx significant for HTN, CAD, MI, PAD, smoking & dementia presents to ED c/o 3 day hx of SOB. Reports worsening w/ exertion, but otherwise denies any exacerbating factors. Denies all CV & URI-like symptoms other than SOB as described  above. Wife is currently hospitalized for COVID. Displays good mentation & insight into current symptoms. Patient is hypotensive in triage.        Review of Systems   Constitutional symptoms:  Fatigue, No weakness,    Skin symptoms:  Negative except as documented in HPI.   Eye symptoms:  No diplopia, no blurred vision, no blindness.    ENMT symptoms:  Negative except as documented in HPI.   Respiratory symptoms:  Negative except as documented in HPI.   Cardiovascular symptoms:  Negative except as documented in HPI.   Gastrointestinal symptoms:  Negative except as documented in HPI.   Genitourinary symptoms:  Negative except as documented in HPI.   Musculoskeletal symptoms:  Negative except as documented in HPI.   Neurologic symptoms:  No headache, no dizziness, no altered level of consciousness, no numbness, no tingling, no weakness, no abnormal balance, no seizure, no altered coordination, no vision changes, no speech problem, no memory loss.    Psychiatric symptoms:  Negative except as documented in HPI.   Endocrine symptoms:  Negative except as documented in HPI.   Hematologic/Lymphatic symptoms:  Negative except as documented in HPI.   Allergy/immunologic symptoms:  Negative except as documented in HPI.             Additional review of systems information: All other systems reviewed and otherwise negative.      Health Status   Allergies:    Allergies (1) Active Reaction  No Known Allergies None Documented  , no known allergies.   Medications:  (Selected)   Inpatient Medications  Ordered  Normal Saline (0.9% NS) IV 1,000 mL: 1,000 mL, 1,000 mL, IV, 1,000 mL/hr, start date 07/21/20 9:24:00 CDT, 1.6, m2  Pending Complete  flurbiprofen ophthalmic: 1 drop(s), form: Soln-Opth, Eye-Right, q30min, Order duration: 4 dose(s), first dose 11/28/12 11:30:00 CST, stop date 11/28/12 13:29:00 CST, on arrival and every 30 minutes x 4 doses  Documented Medications  Documented  AZITHROMYCIN TAB 250MG: Oral, As Directed  FLOVENT  "HFA  AER 110MCG: See Instructions, PRN PRN shortness of breath or wheezing, 220 mcg INH  HYDROCO/APAP TAB 10-325M tab(s), Oral, TID  HYDROCO/APAP TAB 7.5-325: 1 tab(s), Oral, QID  HYDROCODONE-HOMATROPINE SYRUP: 5 mL, Oral, q4hr  LISINOPRIL   TAB 10MG: 10 mg = 1 tab(s), Oral, Daily  Lipitor 20 mg oral tablet: 20 mg = 1 tab(s), Oral, Daily  Plavix 75 mg oral tablet: 75 mg = 1 tab(s), Oral, Daily  ProAir HFA 90 mcg/inh inhalation aerosol with adapter: 1 puff(s), INH, q4hr, PRN PRN for wheezing, 0 Refill(s)  TRIAMCINOLON CRE 0.1%: 1 mahnaz, TOP, TID  donepezil 10 mg oral tablet: 10 mg = 1 tab(s), Oral, Daily, # 30 tab(s), 0 Refill(s)  lisinopril 5 mg oral tablet: 10 mg = 2 tab(s), Oral, Daily, # 30 tab(s), 0 Refill(s)  meloxicam 7.5 mg oral tablet: 7.5 mg = 1 tab(s), Oral, Daily, # 30 tab(s), 0 Refill(s)  omeprazole 20 mg oral DR capsule: 20 mg = 1 cap(s), Oral, Daily, 0 Refill(s), per nurse's notes.   Immunizations: Per nurse's notes.      Past Medical/ Family/ Social History   Medical history:    Resolved  Cataract ([id number]):  Resolved.  Hypertension ([id number]):  Resolved.  Heart disease ([id number]):  Resolved.  Myocardial infarction ([id number]):  Resolved.  Comments:  2012 CST 3:39 Jacklyn Garcia RN  2012 CST 16:02 TYREE - David LOU, Ginna LORD  Shortness of breath ([id number]):  Resolved.  Comments:  2012 CST 3:40 Jacklyn Garcia RN  ok to lie flat  Reflux ([id number]):  Resolved.  Prostate cancer ([id number]):  Resolved.  Comments:  2012 CST 3:40 TYREE Diaz RN, Jacklyn FRANCO  "a few years ago"  Pete ([id number]):  Resolved.  Comments:  2012 CST 3:41 Jacklyn Garcia RN.  for ambulation  Arthritis ([id number]):  Resolved.  Chronic back pain ([id number]):  Resolved.  Comments:  2012 CST 16:05 TYREE Hernandez RN, Ginna DRAKE.  BACK,LEGS,&ARTHRITIS  Forgetful ([id number]):  Resolved.  Numbness ([id number]):  Resolved.  Comments:  2012 CST " 3:42 Jacklyn Garcia RN  numbness, tingling  Pain ([id number]):  Resolved.  Comments:  11/24/2012 CST 3:43 Jacklyn Garcia RN  arthritis pain, back, and leg pain  smoker ([id number]):  Resolved., Reviewed as documented in chart.   Surgical history:    Debridement Foot (Left) on 9/23/2016 at 76 Years.  Comments:  9/23/2016 10:08 CDT - Salinas Lewis CRNA  auto-populated from documented surgical case  Amputation Toe (Left) on 5/29/2015 at 74 Years.  Comments:  5/29/2015 14:24 CDT - Baldemar Cespedes RN  auto-populated from documented surgical case  CARDIAC STENTS in the month of 1/2012 at 71 Years.  back surgery., Reviewed as documented in chart.   Family history:    No family history items have been selected or recorded., Reviewed as documented in chart.   Social history:    Social & Psychosocial Habits    Alcohol  11/24/2012  Use: Current    Type: Beer    Frequency: 3-5 times per week    Employment/School  11/25/2012  Previous employment/school: DISABLED    Highest education: [employer]    Substance Use  11/25/2012 Risk Assessment: Denies Substance Abuse    10/12/2016  Use: Never    Tobacco  05/26/2012  Use: Current    Type: Cigarettes    Tobacco use per day: 10    Number of years: 47    Started at age: 14.0 Years    Previous treatment: None    07/15/2015 Risk Assessment: High Risk    12/31/2019  Use: 10 or more cigarettes (1/    Patient Wants Consult For Cessation Counseling Yes    03/02/2020  Use: 10 or more cigarettes (1/    Patient Wants Consult For Cessation Counseling No    07/21/2020  Use: 4 or less cigarettes(less    Patient Wants Consult For Cessation Counseling No    Abuse/Neglect  03/02/2020  SHX Any signs of abuse or neglect No    07/21/2020  SHX Any signs of abuse or neglect No  , Reviewed as documented in chart.   Problem list:    Active Problems (11)  CAD in native artery   Dementia   DJD (degenerative joint disease)   GERD (gastroesophageal reflux disease)   Hallux valgus  (acquired), unspecified foot   Hammer toe, acquired   Onychomycosis of toenail   PAD (peripheral artery disease)   PVD (peripheral vascular disease)   Tinea pedis   Tobacco user   , per nurse's notes.      Physical Examination               Vital Signs   Vital Signs   7/21/2020 8:51 CDT       Temperature Temporal Artery               36.5 DegC                             Peripheral Pulse Rate     75 bpm                             Respiratory Rate          20 br/min                             SpO2                      98 %                             Oxygen Therapy            Room air                             Systolic Blood Pressure   98 mmHg                             Diastolic Blood Pressure  53 mmHg  LOW  .      Vital Signs (last 24 hrs)_____  Last Charted___________  Heart Rate Peripheral   75 bpm  (JUL 21 08:51)  Resp Rate         20 br/min  (JUL 21 08:51)  SBP      98 mmHg  (JUL 21 08:51)  DBP      L 53mmHg  (JUL 21 08:51)  SpO2      98 %  (JUL 21 08:51)  Weight      54.9 kg  (JUL 21 08:51)  .   Measurements   7/21/2020 8:51 CDT       Weight Dosing             54.9 kg                             Weight Measured           54.9 kg                             Weight Measured and Calculated in Lbs     121.03 lb                             Height/Length Dosing      165.10 cm                             Height/Length Estimated   165.10 cm  .   Basic Oxygen Information   7/21/2020 8:51 CDT       SpO2                      98 %                             Oxygen Therapy            Room air  .   General:  Alert, no acute distress, not anxious, not ill-appearing.    Skin:  Warm, dry, intact, no pallor, no rash, normal for ethnicity.    Head:  Normocephalic, atraumatic.    Neck:  Supple, trachea midline, no tenderness, no JVD, no carotid bruit.    Eye:  Pupils are equal, round and reactive to light, extraocular movements are intact, normal conjunctiva, vision grossly normal.    Ears, nose, mouth and throat:  Tympanic  membranes clear, oral mucosa moist, no pharyngeal erythema or exudate.    Cardiovascular:  Regular rate and rhythm, No murmur, Normal peripheral perfusion, No edema.    Respiratory:  Breath sounds are equal, Respirations: Regular, Breath sounds: Bilateral, upper lobe, middle lobe, base(s), diminished, no crackles present, no rales present, no rhonchi present, no wheezes present, no pleural friction rub present, Retractions: None.    Chest wall:  No tenderness, No deformity.    Back:  Nontender.   Musculoskeletal:  Normal strength, no tenderness, no swelling.    Gastrointestinal:  Soft, Nontender, Non distended, Normal bowel sounds.    Neurological:  Alert and oriented to person, place, time, and situation, No focal neurological deficit observed, CN II-XII intact, normal sensory observed, normal motor observed, normal speech observed, normal coordination observed.    Lymphatics:  No lymphadenopathy.   Psychiatric:  Cooperative, appropriate mood & affect, normal judgment.       Medical Decision Making   Differential Diagnosis:  Pneumonia, pulmonary embolism, chronic obstructive pulmonary disease, acute myocardial infarction, upper respiratory infection, COVID-19, Dehydration.    Rationale:  IVFs started w/ patient hypotensive & no LE edema or rales/crackles noted on lung auscultation. Upon receiving BNP of 1100 I went & checked on patient & BP had normalized. IVFs stopped. Lungs remain CTA.   Documents reviewed:  Emergency department nurses' notes, emergency department records, prior records.    Orders  Launch Order Profile (Selected)   Inpatient Orders  Ordered  Normal Saline (0.9% NS) IV 1,000 mL: 1,000 mL, 1,000 mL, IV, 1,000 mL/hr, start date 07/21/20 9:24:00 CDT, 1.6, m2  Saline Lock Insert: 07/21/20 9:12:00 CDT, Stop date 07/21/20 9:12:00 CDT  Ordered (Dispatched)  BNP-Pro: Stat collect, 07/21/20 9:11:00 CDT, Blood, Stop date 07/21/20 9:11:00 CDT, Lab Collect, 07/21/20 9:11:00 CDT  Culture Blood: 07/21/20  9:11:00 CDT, Stat collect, Blood, Stop date 07/21/20 9:11:00 CDT  D-Dimer: Stat collect, 07/21/20 9:11:00 CDT, Blood, Stop date 07/21/20 9:11:00 CDT, Lab Collect, 07/21/20 9:11:00 CDT  Lactic Acid: Stat collect, 07/21/20 9:11:00 CDT, Blood, Stop date 07/21/20 9:11:00 CDT, Lab Collect, 07/21/20 9:11:00 CDT  Ordered (Exam Started)  XR Chest 1 View: Stat, 07/21/20 8:55:00 CDT, Shortness of Breath, None, Stretcher, Patient Has IV?, Rad Type, Not Scheduled, 07/21/20 8:55:00 CDT  Ordered (In-Lab)  CK MB: STAT collect, 07/21/20 9:09:22 CDT, BLOOD, Collected, Stop date 07/21/20 9:09:00 CDT, Lab Collect  CK: STAT collect, 07/21/20 9:09:22 CDT, BLOOD, Collected, Stop date 07/21/20 8:55:00 CDT, Lab Collect  CMP: STAT collect, 07/21/20 9:09:22 CDT, BLOOD, Collected, Stop date 07/21/20 9:09:00 CDT, Lab Collect  Troponin-I: STAT collect, 07/21/20 9:09:22 CDT, BLOOD, Collected, Stop date 07/21/20 9:09:00 CDT, Lab Collect  Completed  Automated Diff: NOW collect, 07/21/20 9:09:00 CDT, Blood, Collected, Stop date 07/21/20 9:09:00 CDT, Lab Collect, 07/21/20 8:55:00 CDT  CBC w/ Auto Diff: NOW collect, 07/21/20 9:09:22 CDT, BLOOD, Collected, Stop date 07/21/20 9:09:00 CDT, Lab Collect  EKG Adult 12 Lead: 07/21/20 8:54:00 CDT, Stat, Once, 07/21/20 8:54:00 CDT, Stretcher, Patient Has IV, Standard Precautions, -1, -1, 07/21/20 8:54:00 CDT.   Electrocardiogram:  Time 7/21/2020 08:48:00, rate 83, no ectopy, normal TX & QRS intervals, EP Interp, Multifocal, frequent PVCs w/ compensatory pauses. Inverted T waves in leads II, III & AVF suggesting inferior ischemia, consistent w/ past EKGs. Normal axis. Normal QT.    Results review:  Lab results : Lab View   7/21/2020 9:33 CDT       NT pro BNP.               1,161 pg/mL  HI                             Lactic Acid Lvl           2.8 mmol/L  CRIT                             D-Dimer                   0.44 mcg/ml FEU    7/21/2020 9:09 CDT       Sodium Lvl                143 mmol/L                              Potassium Lvl             3.5 mmol/L                             Chloride                  110 mmol/L  HI                             CO2                       24 mmol/L                             Calcium Lvl               8.2 mg/dL  LOW                             Glucose Lvl               112 mg/dL  HI                             BUN                       26 mg/dL  HI                             Creatinine                1.90 mg/dL  HI                             eGFR-AA                   44 mL/min  LOW                             eGFR-MANN                  37 mL/min  LOW                             Bili Total                0.8 mg/dL                             Bili Direct               0.3 mg/dL  HI                             Bili Indirect             0.5 mg/dL                             AST                       30 unit/L                             ALT                       28 unit/L                             Alk Phos                  69 unit/L                             Total Protein             6.8 gm/dL                             Albumin Lvl               2.9 gm/dL  LOW                             Globulin                  3.90 gm/mL  HI                             A/G Ratio                 0.7 ratio  LOW                             Total CK                  80 unit/L                             CK MB                     <1.0 ng/mL                             Troponin-I                <0.015 ng/mL                             WBC                       3.2 x10(3)/mcL  LOW                             RBC                       6.14 x10(6)/mcL  HI                             Hgb                       14.5 gm/dL                             Hct                       47.1 %                             Platelet                  156 x10(3)/mcL                             MCV                       76.7 fL  LOW                             MCH                       23.6 pg  LOW                              MCHC                      30.8 gm/dL  LOW                             RDW                       16.5 %  HI                             MPV                       11.3 fL  HI                             Abs Neut                  2.21 x10(3)/mcL                             Neutro Auto               68 %  NA                             Lymph Auto                22 %  NA                             Mono Auto                 10 %  NA                             Basophil Auto             0 %  NA                             Abs Neutro                2.21 x10(3)/mcL                             Abs Lymph                 0.7 x10(3)/mcL                             Abs Mono                  0.3 x10(3)/mcL                             Abs Baso                  0.0 x10(3)/mcL    ,    No qualifying data available, Interpretation Abnormal results  AMNA, lactic acidosis. Elevated BNP, COVID (+).    Radiology results:  Reviewed radiologist's report, reveals no acute disease process, Rad Results (ST)  < 12 hrs   Accession: [id number]  Order: XR Chest 1 View  Report Dt/Tm: 07/21/2020 09:50  Report:   one view of the chest     CPT 75015     HISTORY:  Shortness of Breath     FINDINGS:  Examination reveals mediastinal and cardiac silhouettes to be within  normal limits. Lung fields are clear and free of gross infiltrates  atelectases or effusions     IMPRESSION: No active pulmonary disease      .       Reexamination/ Reevaluation   Time: 7/21/2020 11:32:00 .   Vital signs   Basic Oxygen Information   7/21/2020 8:51 CDT       SpO2                      98 %                             Oxygen Therapy            Room air     Course: progressing as expected, well controlled, BP normalized, denies current SOB.   Assessment: exam improved.      Impression and Plan   Diagnosis   COVID-19 virus detected (ERE43-IX U07.1)   Acidosis, lactic (OFL71-PY E87.2)   AMNA (acute kidney injury) (OYP63-VJ N17.9)   Acute hypotension (EBK33-SJ I95.9)   Shortness  of breath (TES02-ZF R06.02)      Calls-Consults   -  7/21/2020 09:15:00 , Rosario FITZGERALD, Daniel Elena, consult, recommends I consulted Dr. Ponce for review of EKG & patient's status on admission & followed his recommendations in ordering lactic acid & blood cultures. Dr. Ponce evaluated patient at bedside & recommended contacting IM for admission regardless of COVID status.    -  7/21/2020 11:32:00 , Magdiel FITZGERALD, Zarina DRAKE, IM, phone call, consult, recommends Will come see patient for admission.    Plan   Condition: Stable.    Disposition: Admit time  7/21/2020 11:42:00, Admit to Inpatient Unit.    Counseled: Patient, Regarding diagnosis, Regarding diagnostic results, Regarding treatment plan, Regarding prescription, Patient indicated understanding of instructions.       Addendum      Teaching-Supervisory Addendum-Brief   I participated in the following activities of this patients care: the medical history, the physical exam, seen Face to face.   I personally performed: supervision of the patient's care, the medical history, the physical exam.   The case was discussed with: the physician assistant.   Procedures: I directly supervised the entire procedure.   Results interpretation: I agree with the study interpretation in this patient's care.   Notes: Please see separate note. .

## 2022-04-30 NOTE — ED PROVIDER NOTES
Patient:   Ezequiel Merchant            MRN: [id number]            FIN: [id number]               Age:   79 years     Sex:  Male     :  1940   Associated Diagnoses:   COVID-19 virus detected; Acidosis, lactic; AMNA (acute kidney injury); Acute hypotension; Shortness of breath   Author:   Daniel Ponce MD      History of Present Illness   The patient presents with Patient is a 79-year-old male whose wife is COVID positive reports 2 weeks of worsening shortness of breath more significant over the past 3 days..  The onset was 2  weeks ago.  The course/duration of symptoms is fluctuating in intensity.  Location: Bilateral lungs. The degree at onset was moderate.  The degree at present is minimal.  Past Medical History: Reviewed  Past Family History: Reviewed  Past Social History: Reviewed.        Review of Systems   Constitutional symptoms:  No fever, no chills, no sweats, no weakness, no fatigue.    Skin symptoms:  No rash, no lesion.    Respiratory symptoms:  Negative except as documented in HPI.   Cardiovascular symptoms:  Negative except as documented in HPI.   Gastrointestinal symptoms:  No abdominal pain, no nausea, no vomiting, no diarrhea.    Genitourinary symptoms:  No dysuria, no hematuria, no testicular pain.    Musculoskeletal symptoms:  No back pain,    Neurologic symptoms:  No headache, no dizziness, no altered level of consciousness, no numbness, no tingling, no weakness.              Additional review of systems information: All other systems reviewed and otherwise negative.      Physical Examination               Vital Signs   Vital Signs   2020 10:33 CDT      Peripheral Pulse Rate     76 bpm                             Respiratory Rate          18 br/min                             SpO2                      99 %                             Oxygen Therapy            Room air                             Systolic Blood Pressure   119 mmHg                              Diastolic Blood Pressure  58 mmHg  LOW                             Mean Arterial Pressure, Cuff              78 mmHg    7/21/2020 10:00 CDT      Peripheral Pulse Rate     76 bpm                             Respiratory Rate          20 br/min                             SpO2                      98 %                             Oxygen Therapy            Room air                             Systolic Blood Pressure   123 mmHg                             Diastolic Blood Pressure  63 mmHg    7/21/2020 8:51 CDT       Temperature Temporal Artery               36.5 DegC                             Peripheral Pulse Rate     75 bpm                             Respiratory Rate          20 br/min                             SpO2                      98 %                             Oxygen Therapy            Room air                             Systolic Blood Pressure   98 mmHg                             Diastolic Blood Pressure  53 mmHg  LOW  .      Vital Signs (last 24 hrs)_____  Last Charted___________  Heart Rate Peripheral   76 bpm  (JUL 21 10:33)  Resp Rate         18 br/min  (JUL 21 10:33)  SBP      119 mmHg  (JUL 21 10:33)  DBP      L 58mmHg  (JUL 21 10:33)  SpO2      99 %  (JUL 21 10:33)  Weight      54.9 kg  (JUL 21 08:51)  .               Per nurse's notes.   Measurements   7/21/2020 8:51 CDT       Weight Dosing             54.9 kg                             Weight Measured           54.9 kg                             Weight Measured and Calculated in Lbs     121.03 lb                             Height/Length Dosing      165.10 cm                             Height/Length Estimated   165.10 cm  .   Basic Oxygen Information   7/21/2020 10:33 CDT      SpO2                      99 %                             Oxygen Therapy            Room air    7/21/2020 10:00 CDT      SpO2                      98 %                             Oxygen Therapy            Room air    7/21/2020 8:51 CDT       SpO2                       98 %                             Oxygen Therapy            Room air  .   General:  No acute distress.   Skin:  Warm, dry, no rash.    Neck:  Supple.   Cardiovascular:  Regular rate and rhythm, No murmur, Normal peripheral perfusion, No edema.    Respiratory:  Breath sounds are equal, Symmetrical chest wall expansion, Respirations: Regular, Breath sounds: Clear.    Chest wall:  No tenderness, No deformity.    Back:  Nontender, Normal range of motion.    Musculoskeletal:  No swelling, no deformity.    Gastrointestinal:  Soft, Nontender, Non distended, Normal bowel sounds.    Neurological:  Alert and oriented to person, place, time, and situation, No focal neurological deficit observed, CN II-XII intact, normal sensory observed, normal motor observed, normal speech observed, normal coordination observed.    Psychiatric:  Cooperative, appropriate mood & affect.       Medical Decision Making   Electrocardiogram:  Time 7/21/2020 08:48:00, rate 83, normal sinus rhythm, No ST-T changes, normal CO & QRS intervals, numerous PVCs.    Results review:  Lab results : Lab View   7/21/2020 10:58 CDT      COVID-19 Rapid            POSITIVE    7/21/2020 9:33 CDT       Sodium Lvl                143 mmol/L                             Potassium Lvl             3.9 mmol/L                             Chloride                  115 mmol/L  HI                             CO2                       22 mmol/L                             Calcium Lvl               7.4 mg/dL  LOW                             Magnesium Lvl             2.0 mg/dL                             Glucose Lvl               102 mg/dL                             BUN                       21 mg/dL  HI                             Creatinine                1.50 mg/dL  HI                             BUN/Creat Ratio           14 mg/dL                             eGFR-AA                   58 mL/min  LOW                             eGFR-MANN                  48 mL/min  LOW                              LDH                       164 unit/L                             NT pro BNP.               1,161 pg/mL  HI                             Ferritin Lvl              392.1 ng/mL  HI                             Lactic Acid Lvl           2.8 mmol/L  CRIT                             CRP                       2.5 mg/dL  HI                             PT                        14.1 second(s)                             INR                       1.13                             D-Dimer                   0.44 mcg/ml FEU                             Sed Rate                  7 mm/hr    7/21/2020 9:09 CDT       Sodium Lvl                143 mmol/L                             Potassium Lvl             3.5 mmol/L                             Chloride                  110 mmol/L  HI                             CO2                       24 mmol/L                             Calcium Lvl               8.2 mg/dL  LOW                             Glucose Lvl               112 mg/dL  HI                             BUN                       26 mg/dL  HI                             Creatinine                1.90 mg/dL  HI                             eGFR-AA                   44 mL/min  LOW                             eGFR-MANN                  37 mL/min  LOW                             Bili Total                0.8 mg/dL                             Bili Direct               0.3 mg/dL  HI                             Bili Indirect             0.5 mg/dL                             AST                       30 unit/L                             ALT                       28 unit/L                             Alk Phos                  69 unit/L                             Total Protein             6.8 gm/dL                             Albumin Lvl               2.9 gm/dL  LOW                             Globulin                  3.90 gm/mL  HI                             A/G Ratio                 0.7 ratio  LOW                              Total CK                  80 unit/L                             CK MB                     <1.0 ng/mL                             Troponin-I                <0.015 ng/mL                             WBC                       3.2 x10(3)/mcL  LOW                             RBC                       6.14 x10(6)/mcL  HI                             Hgb                       14.5 gm/dL                             Hct                       47.1 %                             Platelet                  156 x10(3)/mcL                             MCV                       76.7 fL  LOW                             MCH                       23.6 pg  LOW                             MCHC                      30.8 gm/dL  LOW                             RDW                       16.5 %  HI                             MPV                       11.3 fL  HI                             Abs Neut                  2.21 x10(3)/mcL                             Neutro Auto               68 %  NA                             Lymph Auto                22 %  NA                             Mono Auto                 10 %  NA                             Basophil Auto             0 %  NA                             Abs Neutro                2.21 x10(3)/mcL                             Abs Lymph                 0.7 x10(3)/mcL                             Abs Mono                  0.3 x10(3)/mcL                             Abs Baso                  0.0 x10(3)/mcL  .   Radiology results:  Rad Results (ST)  < 12 hrs   Accession: [id number]  Order: XR Chest 1 View  Report Dt/Tm: 07/21/2020 09:50  Report:   one view of the chest     CPT 08048     HISTORY:  Shortness of Breath     FINDINGS:  Examination reveals mediastinal and cardiac silhouettes to be within  normal limits. Lung fields are clear and free of gross infiltrates  atelectases or effusions     IMPRESSION: No active pulmonary disease    .      Reexamination/ Reevaluation   Vital signs   results  included from flowsheet : Vital Signs   7/21/2020 12:00 CDT      Peripheral Pulse Rate     83 bpm                             Heart Rate Monitored      86 bpm                             Respiratory Rate          20 br/min                             SpO2                      95 %                             Oxygen Therapy            Room air                             Systolic Blood Pressure   139 mmHg                             Diastolic Blood Pressure  74 mmHg                             Mean Arterial Pressure, Cuff              96 mmHg    7/21/2020 11:00 CDT      Peripheral Pulse Rate     75 bpm                             Heart Rate Monitored      78 bpm                             Respiratory Rate          17 br/min                             SpO2                      97 %                             Oxygen Therapy            Room air                             Systolic Blood Pressure   128 mmHg                             Diastolic Blood Pressure  76 mmHg                             Mean Arterial Pressure, Cuff              93 mmHg    7/21/2020 10:33 CDT      Peripheral Pulse Rate     76 bpm                             Respiratory Rate          18 br/min                             SpO2                      99 %                             Oxygen Therapy            Room air                             Systolic Blood Pressure   119 mmHg                             Diastolic Blood Pressure  58 mmHg  LOW                             Mean Arterial Pressure, Cuff              78 mmHg    7/21/2020 10:00 CDT      Peripheral Pulse Rate     76 bpm                             Respiratory Rate          20 br/min                             SpO2                      98 %                             Oxygen Therapy            Room air                             Systolic Blood Pressure   123 mmHg                             Diastolic Blood Pressure  63 mmHg    7/21/2020 8:51 CDT       Temperature Temporal Artery                36.5 DegC                             Peripheral Pulse Rate     75 bpm                             Respiratory Rate          20 br/min                             SpO2                      98 %                             Oxygen Therapy            Room air                             Systolic Blood Pressure   98 mmHg                             Diastolic Blood Pressure  53 mmHg  LOW     Basic Oxygen Information   7/21/2020 10:33 CDT      SpO2                      99 %                             Oxygen Therapy            Room air    7/21/2020 10:00 CDT      SpO2                      98 %                             Oxygen Therapy            Room air    7/21/2020 8:51 CDT       SpO2                      98 %                             Oxygen Therapy            Room air     Notes:   Patient seen and examined by me alongside PA. Periods of hypotension improving with IV fluids.  Acute renal failure noted.  COVID positive. Admitted to medicine team.  .      Procedure   Critical care note   Total time: 30 minutes spent engaged in work directly related to patient care and/ or available for direct patient care.   Performed by: self, PA.      Impression and Plan   Diagnosis   COVID-19 virus detected (WUS82-JT U07.1)   Acidosis, lactic (TYK77-DG E87.2)   AMNA (acute kidney injury) (MQM66-OC N17.9)   Acute hypotension (YVC46-LG I95.9)   Shortness of breath (YSQ45-ON R06.02)   Plan   Disposition: Admit.

## 2022-05-03 NOTE — HISTORICAL [facility]
This is a historical note converted from [employer]. Formatting and pictures may have been removed.  Please reference [employer] for original formatting and attached multimedia. Admit and Discharge Dates  Admit Date: 07/21/2020  Discharge Date: 07/22/2020  Physicians  Attending Physician - [place] FITZGERALD, Ryan Piper  Admitting Physician - [place] FITZGERALD, Ryan Piper  Primary Care Physician - Jamar Lenz DO  Discharge Diagnosis  AMNA  hypovolemic secondary to poor oral intake  improved with administration of fluids, received 2L of fluids with improvement in renal indices  Upon discharge, BUN/Cr was at baseline  ?   Sepsis?secondary to COVID-19 pneumonitis  likely secondary to COVID-19 pneumonitis as seen on CT thorax  Lactic acidosis of 2.8->1.4 after fluid administration  will discharge with xarelto 10mg po daily x 30 days, medrol dosepak, and z pack  ?  ?Date of symptom onset: 7/15  Date Positive: 7/21  Hospital Day: 2  Oxygen Requirement: RA  LDH, D-Dimer, Ferritin, ESR, CRP Trend: CRP elevated, all other makers neg  Renal Function: ?1.9 on presentation, baseline 1.2?  Antibiotics Name/Dose/Day: Vacn day?2 and Zoysn day 2  Steroids Dose/Day: None  Remdesivir Y/N/Day: None  Anticoagulation: Heparin 5000BID  Nutrition: Cardiac?  ?  Fluid overload secondary to HFrEF with concomitant diastolic dysfunction  -CXR shows no acute cardiopulmonary process  -CT thorax shows groundglass appearance in both upper lobes differential would include inflammatory and atelectasis  -NTproBNP of 1,161  -satting well on room air  ?  CAD s/p PCI  continued on plavix while inpatient  ?  Surgical Procedures  No procedures recorded for this visit.  Immunizations  No immunizations recorded for this visit.  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 ?Who presented to the ER with  complaints of shortness of breath. ?Of note patient does have some baseline dementia but is oriented to self person and place, does have difficulty with memory. ?Patient endorses a shortness of breath is gone on for months now, the only reason he is in the hospital today ?wants to get a checkup considering recently his wife has been diagnosed with COVID 19 and is now in the hospital, he is unable to tell me which hospital she is in but says for a week she has been in that hospital. ?Patient shortness of breath like symptoms have essentially been baseline since the last few months, functionally able to walk around his house with a walker with no symptoms of shortness of breath, no orthopnea or PND, not associated cough, denies chest pain, says he has a good appetite, ate pancakes this morning, denies any weight loss. ?Patient is unable to tell me all of his medical problems or the medications he takes, says he lives at home with his wife, and their friend, will speak with family member on the phone to further elucidate history. ?Denies any complaints of fever. ?Discussed CODE STATUS, DNR/DNI, order placed.  ?  Hospital Course  In the ED the patient was found to be hypotensive to 98/53.? ?The patient received 2L of NS in the ED.? He did not complain of any cough, shortness of breath, or fever while inpatient.? He did not have any significant events during his hospitalization.?CXR showed no acute cardiopulmonary process.? CT scan of the thorax without contrast showed some ground glass opacities.? Lactic acidosis was present to 2.8.? The patient was started on empiric antibiotic therapy with vancomycin and zosyn.? EKG showed T wave inversions in precordial and inferior leads.? BNP was 1100 on admission.? Follow up echocardiogram showed diastolic dysfunction with an EF of 45%.? Overnight the patient did well, had no complaints.? The next morning his AMNA had resolved and he was symptomatically feeling better.? He was  discharged subsequently in stable condition.  Significant Findings  none  Time Spent on discharge  >31 minutes  Objective  Vitals & Measurements  T:?37.3? ?C (Oral)? TMIN:?36.2? ?C (Oral)? TMAX:?37.3? ?C (Oral)? HR:?61(Peripheral)? RR:?20? BP:?133/70? SpO2:?99%? WT:?54.9?kg? BMI:?20.17?  Physical Exam  Gen: Patient is alert and oriented x3. Well developed, well-nourished, NAD.  Head: Normocephalic  Neck: Supple. No carotid bruits  Lungs: Clear to auscultation bilaterally  CV: Normal S1 & S2, RRR, no murmurs appreciated  Abdomen: Soft, NT/ND, bowel sounds present.??No hepatosplenomegaly  Ext: No cyanosis/clubbing/edema, pulses 2+  Skin: No rashes, lesions, ulceration or induration  Patient Discharge Condition  stable and improved  Discharge Disposition  Discharge to home.? Advised to self isolate for at least the next 14 days and return if symptoms worsened.   Discharge Medication Reconciliation  Prescribed  azithromycin (Zithromax Z-Alexsander 250 mg oral tablet)?1 packet(s), Oral, As Directed  methylPREDNISolone (Medrol 4 mg oral tablet)?1 packet(s), Oral, As Directed  rivaroxaban (Xarelto 10mg Tablet)?10 mg, Oral, Daily  Continue  acetaminophen-HYDROcodone (HYDROCO/APAP TAB 10-325MG)?1 tab(s), Oral, TID  acetaminophen-HYDROcodone (HYDROCO/APAP TAB 7.5-325)?1 tab(s), Oral, QID  albuterol (ProAir HFA 90 mcg/inh inhalation aerosol with adapter)?1 puff(s), INH, q4hr, PRN for wheezing  atorvastatin (Lipitor 20 mg oral tablet)?20 mg, Oral, Daily  azithromycin (AZITHROMYCIN TAB 250MG), Oral, As Directed  clopidogrel (Plavix 75 mg oral tablet)?75 mg, Oral, Daily  donepezil (donepezil 10 mg oral tablet)?10 mg, Oral, Daily  fluticasone (FLOVENT HFA ?AER 110MCG)?See Instructions, PRN shortness of breath or wheezing  homatropine-hydrocodone (HYDROCODONE-HOMATROPINE SYRUP)?5 mL, Oral, q4hr  lisinopril (lisinopril 5 mg oral tablet)?10 mg, Oral, Daily  meloxicam (meloxicam 7.5 mg oral tablet)?7.5 mg, Oral, Daily  omeprazole (omeprazole  20 mg oral DR capsule)?20 mg, Oral, Daily  triamcinolone topical (TRIAMCINOLON CRE 0.1%)?1 mahnaz, TOP, TID  Discontinue  lisinopril (LISINOPRIL ? TAB 10MG)?10 mg, Oral, Daily  Education and Orders Provided  Heart Failure  Airborne Precautions, Easy-to-Read  Contact Precautions, Easy-to-Read  Droplet Precautions, Easy-to-Read  Follow up  Report to Emergency Department if symptoms return or worsen  Please self isolate at least for the next 14 days      I was present with the Resident during discharge day management.  ???  [x ] I discussed the case with the Resident and agree with the discharge plan as above.  [ ] I discussed the case with the Resident and agree with the discharge plan as above except:  ???  Time spent on discharge [45 ] minutes

## 2022-05-03 NOTE — HISTORICAL [facility] CERNER
This is a historical note converted from [employer]. Formatting and pictures may have been removed.  Please reference [employer] for original formatting and attached multimedia. Chief Complaint  pt to ER via POV for meat in throat. ?was eating several hrs ago and it became stuck, this has happened before, but passed on its own. ?swallowing secretions and NAD noted.  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 It seems he is being worked up outpatient with Dr. Salas for dysphasia, these records are not available but also with ENT at [facility] for presbyphagia.? Recommendations were for normal diet per last ENT visit note.? Tonight he was eating meat and felt like it got stuck and was painful in his throat, and some immediately came to the ER where he was given glucagon, and a CT of his head and neck showed debris within the upper thoracic esophagus with a broad differential.? In some mild evaluation patient is pleasant and comfortable, and is being admitted to the hospital service with consultation to GI.  Review of Systems  Except as documented, all other ROS reviewed and negative  Physical Exam  Vitals & Measurements  T:?36.4? ?C (Oral)? TMIN:?36.4? ?C (Oral)? TMAX:?36.9? ?C (Oral)? HR:?56(Peripheral)? RR:?18? BP:?114/73? SpO2:?95%?  GENERAL: awake, alert, oriented and in no acute distress  HEENT: normocephalic atraumatic?  NECK: supple?  LUNGS: CTA B/L, no rales or rhonchi, moving air well without resp distress  CVS: Regular rate and rhythm, normal peripheral perfusion  ABD: Soft, non-tender, non-distended, bowel sounds present  EXTREMITIES: no clubbing or cyanosis  SKIN: Warm, dry.?  NEURO: alert and oriented, grossly without focal deficits?  PSYCHIATRIC:  Cooperative  ?  Assessment/Plan  Dysphagia?R13.10  Odynophagia  Presbyphagia followed by ENT  History of prostate cancer,?lung cancer, CAD, HTN, HLD  ?  - npo  - GI consulted for EGD  - resume home meds as appropriate  ?  DVT prophylaxis: SCDs  CODE STATUS: DNR confirmed at bedside  PCP:?[facility]  ?   If patient has been admitted under observation status,?it is with my approval and the patient is admitted under my care.  At least 55 minutes have been spent?on above H&P. ?Approximate?time seen =?1220 AM   Problem List/Past Medical History  Ongoing  2019-nCoV  CAD in native artery  Dementia  DJD (degenerative joint disease)  GERD (gastroesophageal reflux disease)  Hallux valgus (acquired), unspecified foot  Hammer toe, acquired  Onychomycosis of toenail  PAD (peripheral artery disease)  PVD (peripheral vascular disease)  Tinea pedis  Historical  Arthritis  Cane  Cataract  Chronic back pain  Forgetful  Heart disease  Hypertension  Myocardial infarction  Numbness  Pain  Prostate cancer  Reflux  Shortness of breath  smoker  Procedure/Surgical History  Biopsy, lung or mediastinum, percutaneous needle (03/29/2018)  Computerized Tomography (CT Scan) of Bilateral Lungs (03/29/2018)  Excision of Left Upper Lung Lobe, Percutaneous Approach, Diagnostic (03/29/2018)  Dilation of Right Popliteal Artery with Intraluminal Device, Percutaneous Approach (11/17/2016)  Extirpation of Matter from Right Popliteal Artery, Percutaneous Approach (11/17/2016)  Fluoroscopy of Abdominal Aorta using Low Osmolar Contrast (11/17/2016)  Fluoroscopy of Bilateral Renal Arteries using Low Osmolar Contrast (11/17/2016)  Fluoroscopy of Right Lower Extremity Arteries using Low Osmolar Contrast (11/17/2016)  Introduction of Other Therapeutic Substance into Peripheral Artery, Percutaneous Approach (11/17/2016)  Drainage of Buttock Skin, External Approach (10/12/2016)  Incision and drainage of abscess (eg, carbuncle, suppurative hidradenitis, cutaneous or  subcutaneous abscess, cyst, furuncle, or paronychia); complicated or multiple (10/12/2016)  Debridement Foot (Left) (09/23/2016)  Excision of Left Foot Subcutaneous Tissue and Fascia, Open Approach (09/23/2016)  Dilation of Left Peroneal Artery, Percutaneous Approach (09/21/2016)  Dilation of Left Posterior Tibial Artery, Percutaneous Approach (09/21/2016)  Extirpation of Matter from Left Peroneal Artery, Percutaneous Approach (09/21/2016)  Extirpation of Matter from Left Posterior Tibial Artery, Percutaneous Approach (09/21/2016)  Fluoroscopy of Aorta and Bilateral Lower Extremity Arteries using Low Osmolar Contrast (09/21/2016)  Fluoroscopy of Bilateral Renal Arteries using Low Osmolar Contrast (09/21/2016)  Introduction of Other Therapeutic Substance into Peripheral Artery, Percutaneous Approach (09/21/2016)  Amputation of toe (05/29/2015)  Amputation Toe (Left) (05/29/2015)  Amputation, toe; metatarsophalangeal joint (05/29/2015)  Extracapsular cataract removal with insertion of intraocular lens prosthesis (1 stage procedure), manual or mechanical technique (eg, irrigation and aspiration or phacoemulsification). (11/28/2012)  Insertion of intraocular lens prosthesis at time of cataract extraction, one-stage (11/28/2012)  Phacoemulsification and aspiration of cataract (11/28/2012)  CARDIAC STENTS (01.2012)  back surgery   Medications  Inpatient  glucagon, 1 mg= 1 EA, IM, As Directed, PRN  IVF Normal Saline NS Bolus 1000ml 1,000 mL, 1000 mL, IV  Normal Saline Infusion 1,000 mL, 1000 mL, IV  Protonix, 40 mg= 1 EA, IV Push, Daily  Home  AZITHROMYCIN TAB 250MG, Oral, As Directed,? ?Not Taking, Completed Rx: pt states  carvedilol 3.125 mg oral tablet, 3.125 mg= 1 tab(s), Oral, BID  Diflucan 100 mg oral tablet, 100 mg= 1 tab(s), Oral, Daily,? ?Not Taking, Completed Rx: pt states  donepezil 10 mg oral tablet, 10 mg= 1 tab(s), Oral, Daily  Entresto 49 mg-51 mg oral tablet, 1 tab(s), Oral, BID  FLOVENT HFA AER 110MCG,  PRN  Flovent  mcg/inh inhalation aerosol, 2 puff(s), INH, BID  fluconazole 100 mg oral tablet, 100 mg= 1 tab(s), Oral, Daily,? ?Not Taking, Completed Rx: pt states  HYDROCO/APAP TAB 10-325MG, 1 tab(s), Oral, TID  HYDROCO/APAP TAB 7.5-325, 1 tab(s), Oral, QID,? ?Not taking: pt states  HYDROCODONE-HOMATROPINE SYRUP, 5 mL, Oral, q4hr,? ?Not Taking, Completed Rx: pt states  Lipitor 20 mg oral tablet, 20 mg= 1 tab(s), Oral, Daily  lisinopril 5 mg oral tablet, 10 mg= 2 tab(s), Oral, Daily  meloxicam 7.5 mg oral tablet, 7.5 mg= 1 tab(s), Oral, Daily  nystatin 100,000 units/mL oral suspension, [id number] units= 4 mL, Oral, QID,? ?Not Taking, Completed Rx: pt states  omeprazole 20 mg oral DR capsule, See Instructions  Plavix 75 mg oral tablet, 75 mg= 1 tab(s), Oral, Daily  ProAir HFA 90 mcg/inh inhalation aerosol with adapter, 1 puff(s), INH, q4hr, PRN  TRIAMCINOLON CRE 0.1%, 1 mahnaz, TOP, TID  Xarelto 10mg Tablet, 10 mg= 1 tab(s), Oral, Daily  Allergies  No Known Allergies  Social History  Abuse/Neglect  No, No, Yes, 03/23/2021  Alcohol  Current, Beer, 3-5 times per week, 11/24/2012  Employment/School  Previous employment/school: DISABLED. Highest education level: GRADE SCHO[employer]., 11/25/2012  Substance Use - Denies Substance Abuse, 11/25/2012  Never, 10/12/2016  Tobacco - High Risk, 07/15/2015  5-9 cigarettes (between 1/4 to 1/2 pack)/day in last 30 days, Cigarettes, No, 03/23/2021  Lab Results  Labs Last 24 Hours?  ?Chemistry? Hematology/Coagulation?   Sodium Lvl: 144 mmol/L (07/14/21 16:38:00) PT: 13.3 second(s) (07/14/21 16:38:00)   Potassium Lvl: 4.8 mmol/L (07/14/21 16:38:00) INR: 1 (07/14/21 16:38:00)   Chloride:?109 mmol/L?High (07/14/21 16:38:00) PTT:?36.6 second(s)?High (07/14/21 16:38:00)   CO2: 24 mmol/L (07/14/21 16:38:00) WBC: 5.4 x10(3)/mcL (07/14/21 16:38:00)   Calcium Lvl: 8.9 mg/dL (07/14/21 16:38:00) RBC:?6.24 x10(6)/mcL?High (07/14/21 16:38:00)   Glucose Lvl: 84 mg/dL (07/14/21 16:38:00) Hgb: 14.7 gm/dL  (07/14/21 16:38:00)   BUN: 13.8 mg/dL (07/14/21 16:38:00) Hct: 49.1 % (07/14/21 16:38:00)   Creatinine:?1.61 mg/dL?High (07/14/21 16:38:00) Platelet: 250 x10(3)/mcL (07/14/21 16:38:00)   eGFR-AA: 54 (07/14/21 16:38:00) MCV:?78.7 fL?Low (07/14/21 16:38:00)   eGFR-MANN: 44 mL/min/1.73 m2 (07/14/21 16:38:00) MCH:?23.6 pg?Low (07/14/21 16:38:00)   Bili Total: 0.7 mg/dL (07/14/21 16:38:00) MCHC:?29.9 gm/dL?Low (07/14/21 16:38:00)   Bili Direct: 0.3 mg/dL (07/14/21 16:38:00) RDW: 16.7 % (07/14/21 16:38:00)   Bili Indirect: 0.4 mg/dL (07/14/21 16:38:00) MPV: 9.8 fL (07/14/21 16:38:00)   AST: 22 unit/L (07/14/21 16:38:00) Abs Neut: 3.31 x10(3)/mcL (07/14/21 16:38:00)   ALT: 19 unit/L (07/14/21 16:38:00) Neutro Auto: 61 % (07/14/21 16:38:00)   Alk Phos: 88 unit/L (07/14/21 16:38:00) Lymph Auto: 21 % (07/14/21 16:38:00)   Total Protein: 6.7 gm/dL (07/14/21 16:38:00) Mono Auto: 15 % (07/14/21 16:38:00)   Albumin Lvl: 3.4 gm/dL (07/14/21 16:38:00) Eos Auto: 3 % (07/14/21 16:38:00)   Globulin: 3.3 gm/dL (07/14/21 16:38:00) Abs Eos: 0.2 x10(3)/mcL (07/14/21 16:38:00)   A/G Ratio:?1 ratio?Low (07/14/21 16:38:00) Basophil Auto: 0 % (07/14/21 16:38:00)    Abs Neutro: 3.31 x10(3)/mcL (07/14/21 16:38:00)    Abs Lymph: 1.1 x10(3)/mcL (07/14/21 16:38:00)    Abs Mono: 0.8 x10(3)/mcL (07/14/21 16:38:00)    Abs Baso: 0 x10(3)/mcL (07/14/21 16:38:00)   ?  Diagnostic Results  Accession:?[id number]  Order:?CT Soft Tissue Neck W Contrast  Report Dt/Tm:?07/14/2021 22:27  Report:?  ?  CT of the neck soft tissues with contrast  ?  Technique: Images of the neck soft tissues were obtained with  contrast.  ?  Total DLP: 871 mGy cm?  ?  Indication: Dysphasia  ?  Comparison: None.  ?  Findings:?  Included intracranial contents are normal. There is no hemorrhage,  hydrocephalus, or midline shift. There is no abnormal intracranial  enhancement. The bilateral orbits are normal. The paranasal sinuses  and mastoid air cells are normally developed and free  of disease.  ?  The nasopharynx oropharynx are widely patent. The airway is widely  patent. There is no cervical adenopathy. The parotid and 70 glands are  normal. The thyroid gland is normal.  ?  There is debris within the upper thoracic esophagus best visualized on  series 3 image 82. There is no lytic or blastic osseous lesion.  ?  Impression:  Debris within the upper thoracic esophagus as detailed above.  Differential includes secretions, ingested material, and underlying  mass lesion. Endoscopic evaluation would be beneficial.  ?  Accession:?[id number]  Order:?CT Thorax W Contrast  Report Dt/Tm:?07/14/2021 22:24  Report:?  ?  CT of the chest with contrast  ?  Technique: Images of the chest were obtained with contrast.  ?  Total DLP: 419 mGy cm?  ?  Indication: Odynophagia  ?  Comparison: CT of the chest 2/18/2021.  ?  Findings:?  The airway is widely patent. There is no mediastinal or hilar  adenopathy. There is no central pulmonary embolus. The heart is  nonenlarged.  ?  There is minimal atelectasis in the lingula. The lung parenchyma is  otherwise clear. There is no pulmonary nodule or mass. There is no  pleural effusion. There is no pneumothorax. There is no groundglass or  reticulonodular airspace opacity.  ?  The upper abdomen demonstrates no acute abnormality. There is no lytic  or blastic osseous lesion.  ?  Impression:  Minimal atelectasis in the lingula without finding to account for  odynophagia.  ?  Accession:?[id number]  Order:?XR Soft Tissue Neck  Report Dt/Tm:?07/14/2021 16:41  Report:?  XR Soft Tissue Neck  ?  HISTORY: Food bolus  ?  COMPARISON: Radiographs dated 3/3/2020  ?  FINDINGS:  The airways are patent. There is no foreign body or radiopaque food  bolus identified. The epiglottis is normal in thickness. The  retropharyngeal soft tissues are within normal limits.  ?  ?  IMPRESSION:  No radiopaque foreign body or food bolus identified.  ?  ?

## 2022-05-03 NOTE — HISTORICAL [facility]ER
This is a historical note converted from [place]. Formatting and pictures may have been removed.  Please reference [employer] for original formatting and attached multimedia. Chief Complaint  pt to ER via POV for meat in throat. ?was eating several hrs ago and it became stuck, this has happened before, but passed on its own. ?swallowing secretions and NAD noted.  Reason for Consultation  odynophagia  History of Present Illness  80-year-old AA male known to Dr. Salas with a PMHx of PVD, CAD/PCI, prostate cancer, lung squamous cell carcinoma s/p XRT in 2018, HTN, HLD, COVID-19 in July 2020.? Patient presented to the ED on 7/14/29 with complaints of acute onset, dysphagia with associated foreign body sensation after eating a pork chop.? He is able to tolerate his saliva, but is unable to tolerate water or liquids.? However, he is complaining of odynophagia.? He had two other episodes this year that ultimately passed on their own without intervention.  ?  In the ED, afebrile and VSS.? Work-up significant for CT soft tissues of the neck noted debris within the upper thoracic esophagus.? CT thorax with minimal atelectasis in the lingula.? Patient was admitted and GI was consulted for further evaluation.  ?  Review his records reviewed?  Last EGD was in September 2020 for solid, progressive, persistent intermittent dysphagia with associated weight loss which found normal duodenum and stomach, esophageal candidiasis, inflammatory type change in the posterior oropharynx of unclear significance for which patient was referred to ENT.? He was treated with Diflucan, but is unsure if he took it.  ?  It appears that patient most recently saw ENT at [facility] in March of this year after having a recent MBS which found mild pharyngeal delay down to the level of the vallecula with all consistencies tested.? There was only laryngeal penetration on one occasion with thin liquids which was cleared immediately and without difficulty.? No  aspiration.? He had some mild vallecular residue with all consistencies which was cleared with a double swallow followed by ingestion of liquid.? It was felt that these findings were consistent with presbyphagia.? It was recommended that he continue with a regular soft diet with thin liquids and no modifications were needed.  ?  Patient is edentulous and has dentures.? However, he lost his bottom dentures.? He has an appointment in October to get new ones.? Regardless, he does not wear them to eat. Appetite is good.? He last lost 10 lbs and is down from a size 34 to size 32 in the past year.  ?  Collateral information obtained from family at bedside.  Review of Systems  Constitutional:?no fevers, chills,?fatigue, weakness.  Eye:?no vision loss, eye redness, drainage, or pain.  ENMT:?No hearing problems.  Respiratory:? no wheezing, no shortness of breath, no cough.  Cardiovascular:?no chest pain, no palpitations, no edema.  Gastrointestinal:?see HPI.  Genitourinary:?no dysuria, no urinary frequency or urgency, no hematuria.  Hema/Lymph:?no abnormal bruising or bleeding.  Musculoskeletal:?no back, muscle or joint pain, no joint swelling.  Integumentary:?no skin rash or abnormal lesion.  Neurologic: no headache, no dizziness, no numbness, no confusion.  Physical Exam  Vitals & Measurements  T:?36.4? ?C (Oral)? TMIN:?36.4? ?C (Oral)? TMAX:?36.9? ?C (Oral)? HR:?63(Peripheral)? RR:?18? BP:?111/49? SpO2:?97%?  General:?well-developed, well-nourished, no acute distress.  Eye: EOMI, clear conjunctiva.  HENT:?normocephalic, atraumatic. moist oral mucosa. endentulous.  Respiratory:?non-labored.  Cardiovascular:?regular rate and rhythm. no edema.  Gastrointestinal:?soft, non-tender, non-distended, normal bowel sounds, no masses to palpation.  Musculoskeletal:?normal range of motion.  Integumentary: warm, dry, pink.  Neurologic: A&Ox3.  Psychiatric: Cooperative.? Appropriate mood and affect.  Assessment/Plan  81 yo AAM known to  Dr. Salas with a PMHx of PVD, CAD/PCI, prostate cancer, lung squamous cell carcinoma s/p XRT in 2018, HTN, HLD, COVID-19 in July 2020.??Here with dysphagia with associated foreign body sensation after eating a pork chop.?  ?  1. Esophageal foreign body  2.?Odynophagia  3. Chronic presbyphagia  ?  - Keep NPO for EGD today   Problem List/Past Medical History  Ongoing  2019-nCoV  CAD in native artery  Dementia  DJD (degenerative joint disease)  GERD (gastroesophageal reflux disease)  Hallux valgus (acquired), unspecified foot  Hammer toe, acquired  Onychomycosis of toenail  PAD (peripheral artery disease)  PVD (peripheral vascular disease)  Tinea pedis  Historical  Arthritis  Cane  Cataract  Chronic back pain  Forgetful  Heart disease  Hypertension  Myocardial infarction  Numbness  Pain  Prostate cancer  Reflux  Shortness of breath  smoker  Procedure/Surgical History  Biopsy, lung or mediastinum, percutaneous needle (03/29/2018)  Computerized Tomography (CT Scan) of Bilateral Lungs (03/29/2018)  Excision of Left Upper Lung Lobe, Percutaneous Approach, Diagnostic (03/29/2018)  Dilation of Right Popliteal Artery with Intraluminal Device, Percutaneous Approach (11/17/2016)  Extirpation of Matter from Right Popliteal Artery, Percutaneous Approach (11/17/2016)  Fluoroscopy of Abdominal Aorta using Low Osmolar Contrast (11/17/2016)  Fluoroscopy of Bilateral Renal Arteries using Low Osmolar Contrast (11/17/2016)  Fluoroscopy of Right Lower Extremity Arteries using Low Osmolar Contrast (11/17/2016)  Introduction of Other Therapeutic Substance into Peripheral Artery, Percutaneous Approach (11/17/2016)  Drainage of Buttock Skin, External Approach (10/12/2016)  Incision and drainage of abscess (eg, carbuncle, suppurative hidradenitis, cutaneous or subcutaneous abscess, cyst, furuncle, or paronychia); complicated or multiple (10/12/2016)  Debridement Foot (Left) (09/23/2016)  Excision of Left Foot Subcutaneous Tissue and Fascia,  Open Approach (09/23/2016)  Dilation of Left Peroneal Artery, Percutaneous Approach (09/21/2016)  Dilation of Left Posterior Tibial Artery, Percutaneous Approach (09/21/2016)  Extirpation of Matter from Left Peroneal Artery, Percutaneous Approach (09/21/2016)  Extirpation of Matter from Left Posterior Tibial Artery, Percutaneous Approach (09/21/2016)  Fluoroscopy of Aorta and Bilateral Lower Extremity Arteries using Low Osmolar Contrast (09/21/2016)  Fluoroscopy of Bilateral Renal Arteries using Low Osmolar Contrast (09/21/2016)  Introduction of Other Therapeutic Substance into Peripheral Artery, Percutaneous Approach (09/21/2016)  Amputation of toe (05/29/2015)  Amputation Toe (Left) (05/29/2015)  Amputation, toe; metatarsophalangeal joint (05/29/2015)  Extracapsular cataract removal with insertion of intraocular lens prosthesis (1 stage procedure), manual or mechanical technique (eg, irrigation and aspiration or phacoemulsification). (11/28/2012)  Insertion of intraocular lens prosthesis at time of cataract extraction, one-stage (11/28/2012)  Phacoemulsification and aspiration of cataract (11/28/2012)  CARDIAC STENTS (01.2012)  back surgery   Medications  Inpatient  glucagon, 1 mg= 1 EA, IM, As Directed, PRN  IVF Normal Saline NS Bolus 1000ml 1,000 mL, 1000 mL, IV  Normal Saline Infusion 1,000 mL, 1000 mL, IV  Protonix, 40 mg= 1 EA, IV Push, Daily  Zofran, 4 mg= 2 mL, IV Push, q4hr, PRN  Home  AZITHROMYCIN TAB 250MG, Oral, As Directed,? ?Not Taking, Completed Rx: pt states  carvedilol 3.125 mg oral tablet, 3.125 mg= 1 tab(s), Oral, BID  Diflucan 100 mg oral tablet, 100 mg= 1 tab(s), Oral, Daily,? ?Not Taking, Completed Rx: pt states  donepezil 10 mg oral tablet, 10 mg= 1 tab(s), Oral, Daily  Entresto 49 mg-51 mg oral tablet, 1 tab(s), Oral, BID  FLOVENT HFA AER 110MCG, PRN  Flovent  mcg/inh inhalation aerosol, 2 puff(s), INH, BID  fluconazole 100 mg oral tablet, 100 mg= 1 tab(s), Oral, Daily,? ?Not Taking,  Completed Rx: pt states  HYDROCO/APAP TAB 10-325MG, 1 tab(s), Oral, TID,? ?Not taking: pt states  HYDROCO/APAP TAB 7.5-325, 1 tab(s), Oral, QID,? ?Not taking: pt states  HYDROCODONE-HOMATROPINE SYRUP, 5 mL, Oral, q4hr,? ?Not Taking, Completed Rx: pt states  Lipitor 20 mg oral tablet, 20 mg= 1 tab(s), Oral, Daily  lisinopril 5 mg oral tablet, 10 mg= 2 tab(s), Oral, Daily  meloxicam 7.5 mg oral tablet, 7.5 mg= 1 tab(s), Oral, Daily  nystatin 100,000 units/mL oral suspension, [id number] units= 4 mL, Oral, QID,? ?Not Taking, Completed Rx: pt states  omeprazole 20 mg oral DR capsule, See Instructions  Plavix 75 mg oral tablet, 75 mg= 1 tab(s), Oral, Daily  ProAir HFA 90 mcg/inh inhalation aerosol with adapter, 1 puff(s), INH, q4hr, PRN  TRIAMCINOLON CRE 0.1%, 1 mahnaz, TOP, TID  Xarelto 10mg Tablet, 10 mg= 1 tab(s), Oral, Daily  Allergies  No Known Allergies  Social History  Abuse/Neglect  No, No, Yes, 07/15/2021  No, No, Yes, 03/23/2021  Alcohol  Current, Beer, 3-5 times per week, 11/24/2012  Employment/School  Previous employment/school: DISABLED. Highest education level: [employer]ADE SCHO[employer]., 11/25/2012  Substance Use - Denies Substance Abuse, 11/25/2012  Never, 10/12/2016  Tobacco - High Risk, 07/15/2015  5-9 cigarettes (between 1/4 to 1/2 pack)/day in last 30 days, No, 07/15/2021  5-9 cigarettes (between 1/4 to 1/2 pack)/day in last 30 days, Cigarettes, No, 03/23/2021  Family History  no fam hx of gi neoplasia  Lab Results  Labs Last 24 Hours?  ?Chemistry? Hematology/Coagulation?   Sodium Lvl: 144 mmol/L (07/14/21 16:38:00) PT: 13.3 second(s) (07/14/21 16:38:00)   Potassium Lvl: 4.8 mmol/L (07/14/21 16:38:00) INR: 1 (07/14/21 16:38:00)   Chloride:?109 mmol/L?High (07/14/21 16:38:00) PTT:?36.6 second(s)?High (07/14/21 16:38:00)   CO2: 24 mmol/L (07/14/21 16:38:00) WBC: 5.4 x10(3)/mcL (07/14/21 16:38:00)   Calcium Lvl: 8.9 mg/dL (07/14/21 16:38:00) RBC:?6.24 x10(6)/mcL?High (07/14/21 16:38:00)   Glucose Lvl: 84 mg/dL (07/14/21  16:38:00) Hgb: 14.7 gm/dL (07/14/21 16:38:00)   BUN: 13.8 mg/dL (07/14/21 16:38:00) Hct: 49.1 % (07/14/21 16:38:00)   Creatinine:?1.61 mg/dL?High (07/14/21 16:38:00) Platelet: 250 x10(3)/mcL (07/14/21 16:38:00)   eGFR-AA: 54 (07/14/21 16:38:00) MCV:?78.7 fL?Low (07/14/21 16:38:00)   eGFR-MANN: 44 mL/min/1.73 m2 (07/14/21 16:38:00) MCH:?23.6 pg?Low (07/14/21 16:38:00)   Bili Total: 0.7 mg/dL (07/14/21 16:38:00) MCHC:?29.9 gm/dL?Low (07/14/21 16:38:00)   Bili Direct: 0.3 mg/dL (07/14/21 16:38:00) RDW: 16.7 % (07/14/21 16:38:00)   Bili Indirect: 0.4 mg/dL (07/14/21 16:38:00) MPV: 9.8 fL (07/14/21 16:38:00)   AST: 22 unit/L (07/14/21 16:38:00) Abs Neut: 3.31 x10(3)/mcL (07/14/21 16:38:00)   ALT: 19 unit/L (07/14/21 16:38:00) Neutro Auto: 61 % (07/14/21 16:38:00)   Alk Phos: 88 unit/L (07/14/21 16:38:00) Lymph Auto: 21 % (07/14/21 16:38:00)   Total Protein: 6.7 gm/dL (07/14/21 16:38:00) Mono Auto: 15 % (07/14/21 16:38:00)   Albumin Lvl: 3.4 gm/dL (07/14/21 16:38:00) Eos Auto: 3 % (07/14/21 16:38:00)   Globulin: 3.3 gm/dL (07/14/21 16:38:00) Abs Eos: 0.2 x10(3)/mcL (07/14/21 16:38:00)   A/G Ratio:?1 ratio?Low (07/14/21 16:38:00) Basophil Auto: 0 % (07/14/21 16:38:00)    Abs Neutro: 3.31 x10(3)/mcL (07/14/21 16:38:00)    Abs Lymph: 1.1 x10(3)/mcL (07/14/21 16:38:00)    Abs Mono: 0.8 x10(3)/mcL (07/14/21 16:38:00)    Abs Baso: 0 x10(3)/mcL (07/14/21 16:38:00)   Diagnostic Results  (07/14/2021 16:40 CDT XR Soft Tissue Neck)  * Final Report *  ?  Reason For Exam  food bolus;Other (please specify)  ?  Radiology Report  XR Soft Tissue Neck  ?  HISTORY: Food bolus  ?  COMPARISON: Radiographs dated 3/3/2020  ?  FINDINGS:  The airways are patent. There is no foreign body or radiopaque food  bolus identified. The epiglottis is normal in thickness. The  retropharyngeal soft tissues are within normal limits.  ?  ?  IMPRESSION:  No radiopaque foreign body or food bolus identified.  ?  ?  Signature Line  Electronically Signed By:  Martina Christiansen MD  Date/Time Signed: 07/14/2021 16:42  ? [1]  ?  (07/14/2021 22:23 CDT CT Soft Tissue Neck W Contrast)  * Final Report *  ?  Reason For Exam  dysphagia/odynophagia;Pain, neck  ?  Radiology Report  ?  CT of the neck soft tissues with contrast  ?  Technique: Images of the neck soft tissues were obtained with  contrast.  ?  Total DLP: 871 mGy cm?  ?  Indication: Dysphasia  ?  Comparison: None.  ?  Findings:?  Included intracranial contents are normal. There is no hemorrhage,  hydrocephalus, or midline shift. There is no abnormal intracranial  enhancement. The bilateral orbits are normal. The paranasal sinuses  and mastoid air cells are normally developed and free of disease.  ?  The nasopharynx oropharynx are widely patent. The airway is widely  patent. There is no cervical adenopathy. The parotid and 70 glands are  normal. The thyroid gland is normal.  ?  There is debris within the upper thoracic esophagus best visualized on  series 3 image 82. There is no lytic or blastic osseous lesion.  ?  Impression:  Debris within the upper thoracic esophagus as detailed above.  Differential includes secretions, ingested material, and underlying  mass lesion. Endoscopic evaluation would be beneficial.  ?  Signature Line  Electronically Signed By: Michel Ochoa MD  Date/Time Signed: 07/14/2021 22:28 [2]  ?  (07/14/2021 22:23 CDT CT Thorax W Contrast)  * Final Report *  ?  Reason For Exam  odynophagia;Other (please specify)  ?  Radiology Report  ?  CT of the chest with contrast  ?  Technique: Images of the chest were obtained with contrast.  ?  Total DLP: 419 mGy cm?  ?  Indication: Odynophagia  ?  Comparison: CT of the chest 2/18/2021.  ?  Findings:?  The airway is widely patent. There is no mediastinal or hilar  adenopathy. There is no central pulmonary embolus. The heart is  nonenlarged.  ?  There is minimal atelectasis in the lingula. The lung parenchyma is  otherwise clear. There is no pulmonary nodule or  mass. There is no  pleural effusion. There is no pneumothorax. There is no groundglass or  reticulonodular airspace opacity.  ?  The upper abdomen demonstrates no acute abnormality. There is no lytic  or blastic osseous lesion.  ?  Impression:  Minimal atelectasis in the lingula without finding to account for  odynophagia.  ?  Signature Line  Electronically Signed By: Michel Ochoa MD  Date/Time Signed: 07/14/2021 22:27 [3]     [1]?XR Soft Tissue Neck; Martina Christiansen MD 07/14/2021 16:40 CDT  [2]?CT Soft Tissue Neck W Contrast; Michel Ochoa MD 07/14/2021 22:23 CDT  [3]?CT Thorax W Contrast; Michel Ochoa MD 07/14/2021 22:23 CDT   Patient seen and examined.? Plan EGD today

## 2022-05-10 ENCOUNTER — HOSPITAL ENCOUNTER (EMERGENCY)
Facility: HOSPITAL | Age: 82
Discharge: HOME | End: 2022-05-11
Attending: EMERGENCY MEDICINE
Payer: COMMERCIAL

## 2022-05-10 DIAGNOSIS — R52 PAIN: ICD-10-CM

## 2022-05-10 DIAGNOSIS — S29.011A MUSCLE STRAIN OF CHEST WALL, INITIAL ENCOUNTER: Primary | ICD-10-CM

## 2022-05-10 DIAGNOSIS — J20.9 ACUTE BRONCHITIS, UNSPECIFIED ORGANISM: ICD-10-CM

## 2022-05-10 DIAGNOSIS — R10.9 LEFT FLANK PAIN: ICD-10-CM

## 2022-05-10 LAB
ALBUMIN SERPL-MCNC: 3.2 GM/DL (ref 3.4–4.8)
ALBUMIN/GLOB SERPL: 1 RATIO (ref 1.1–2)
ALP SERPL-CCNC: 74 UNIT/L (ref 40–150)
ALT SERPL-CCNC: 8 UNIT/L (ref 0–55)
APPEARANCE UR: CLEAR
AST SERPL-CCNC: 14 UNIT/L (ref 5–34)
BASOPHILS # BLD AUTO: 0.02 X10(3)/MCL (ref 0–0.2)
BASOPHILS NFR BLD AUTO: 0.2 %
BILIRUB UR QL STRIP.AUTO: ABNORMAL MG/DL
BILIRUBIN DIRECT+TOT PNL SERPL-MCNC: 0.4 MG/DL (ref 0–0.8)
BILIRUBIN DIRECT+TOT PNL SERPL-MCNC: 0.5 MG/DL (ref 0–0.5)
BILIRUBIN DIRECT+TOT PNL SERPL-MCNC: 0.9 MG/DL
BUN SERPL-MCNC: 14.8 MG/DL (ref 8.4–25.7)
CALCIUM SERPL-MCNC: 8.6 MG/DL (ref 8.8–10)
CHLORIDE SERPL-SCNC: 108 MMOL/L (ref 98–107)
CO2 SERPL-SCNC: 23 MMOL/L (ref 23–31)
COLOR UR AUTO: ABNORMAL
CREAT SERPL-MCNC: 1.37 MG/DL (ref 0.73–1.18)
EOSINOPHIL # BLD AUTO: 0.03 X10(3)/MCL (ref 0–0.9)
EOSINOPHIL NFR BLD AUTO: 0.4 %
ERYTHROCYTE [DISTWIDTH] IN BLOOD BY AUTOMATED COUNT: 14.8 % (ref 11.5–17)
GLOBULIN SER-MCNC: 3.1 GM/DL (ref 2.4–3.5)
GLUCOSE SERPL-MCNC: 98 MG/DL (ref 82–115)
GLUCOSE UR QL STRIP.AUTO: NEGATIVE MG/DL
HCT VFR BLD AUTO: 43.3 % (ref 42–52)
HGB BLD-MCNC: 13.3 GM/DL (ref 14–18)
IMM GRANULOCYTES # BLD AUTO: 0.04 X10(3)/MCL (ref 0–0.02)
IMM GRANULOCYTES NFR BLD AUTO: 0.5 % (ref 0–0.43)
KETONES UR QL STRIP.AUTO: ABNORMAL MG/DL
LEUKOCYTE ESTERASE UR QL STRIP.AUTO: ABNORMAL UNIT/L
LIPASE SERPL-CCNC: 21 U/L
LYMPHOCYTES # BLD AUTO: 0.72 X10(3)/MCL (ref 0.6–4.6)
LYMPHOCYTES NFR BLD AUTO: 8.7 %
MCH RBC QN AUTO: 23.9 PG (ref 27–31)
MCHC RBC AUTO-ENTMCNC: 30.7 MG/DL (ref 33–36)
MCV RBC AUTO: 77.9 FL (ref 80–94)
MONOCYTES # BLD AUTO: 1.12 X10(3)/MCL (ref 0.1–1.3)
MONOCYTES NFR BLD AUTO: 13.6 %
NEUTROPHILS # BLD AUTO: 6.3 X10(3)/MCL (ref 2.1–9.2)
NEUTROPHILS NFR BLD AUTO: 76.6 %
NITRITE UR QL STRIP.AUTO: NEGATIVE
NRBC BLD AUTO-RTO: 0 %
PH UR STRIP.AUTO: 5 [PH]
PLATELET # BLD AUTO: 217 X10(3)/MCL (ref 130–400)
PMV BLD AUTO: 10.4 FL (ref 9.4–12.4)
POTASSIUM SERPL-SCNC: 4 MMOL/L (ref 3.5–5.1)
PROT SERPL-MCNC: 6.3 GM/DL (ref 5.8–7.6)
PROT UR QL STRIP.AUTO: ABNORMAL MG/DL
RBC # BLD AUTO: 5.56 X10(6)/MCL (ref 4.7–6.1)
RBC UR QL AUTO: ABNORMAL UNIT/L
SODIUM SERPL-SCNC: 141 MMOL/L (ref 136–145)
SP GR UR STRIP.AUTO: >=1.04 (ref 1–1.03)
UROBILINOGEN UR STRIP-ACNC: 1 MG/DL
WBC # SPEC AUTO: 8.2 X10(3)/MCL (ref 4.5–11.5)

## 2022-05-10 PROCEDURE — 85025 COMPLETE CBC W/AUTO DIFF WBC: CPT | Performed by: NURSE PRACTITIONER

## 2022-05-10 PROCEDURE — 99285 EMERGENCY DEPT VISIT HI MDM: CPT | Mod: 25

## 2022-05-10 PROCEDURE — 63600175 PHARM REV CODE 636 W HCPCS: Performed by: EMERGENCY MEDICINE

## 2022-05-10 PROCEDURE — 96374 THER/PROPH/DIAG INJ IV PUSH: CPT

## 2022-05-10 PROCEDURE — 80053 COMPREHEN METABOLIC PANEL: CPT | Performed by: NURSE PRACTITIONER

## 2022-05-10 PROCEDURE — 83690 ASSAY OF LIPASE: CPT | Performed by: NURSE PRACTITIONER

## 2022-05-10 PROCEDURE — 84075 ASSAY ALKALINE PHOSPHATASE: CPT | Performed by: NURSE PRACTITIONER

## 2022-05-10 PROCEDURE — 36415 COLL VENOUS BLD VENIPUNCTURE: CPT | Performed by: NURSE PRACTITIONER

## 2022-05-10 PROCEDURE — 81003 URINALYSIS AUTO W/O SCOPE: CPT | Performed by: NURSE PRACTITIONER

## 2022-05-10 PROCEDURE — 81015 MICROSCOPIC EXAM OF URINE: CPT | Performed by: NURSE PRACTITIONER

## 2022-05-10 RX ORDER — METHOCARBAMOL 100 MG/ML
1000 INJECTION, SOLUTION INTRAMUSCULAR; INTRAVENOUS ONCE
Status: COMPLETED | OUTPATIENT
Start: 2022-05-10 | End: 2022-05-10

## 2022-05-10 RX ADMIN — METHOCARBAMOL 1000 MG: 100 INJECTION, SOLUTION INTRAMUSCULAR; INTRAVENOUS at 07:05

## 2022-05-10 NOTE — FIRST PROVIDER EVALUATION
Medical screening exam completed.  I have conducted a focused provider triage encounter, findings are as follows:    Brief history of present illness:  States left flank pain x 5-6 of days. Denies any nausea or vomiting.    There were no vitals filed for this visit.    Pertinent physical exam:  Awake and alert    Brief workup plan:  labs    Preliminary workup initiated; this workup will be continued and followed by the physician or advanced practice provider that is assigned to the patient when roomed.

## 2022-05-10 NOTE — ED PROVIDER NOTES
Encounter Date: 5/10/2022    SCRIBE #1 NOTE: I, Citlali Aviles, am scribing for, and in the presence of,  Chetna Sommer MD. I have scribed the following portions of the note - Other sections scribed: HPI, ROS, PE.       History     Chief Complaint   Patient presents with    Flank Pain     Pt to ER via POV for left flank pain. Also cough.  Started 6 days ago.  Hx of lung and throat CA.      80 y/o male with hx of lung, throat, and prostate cancer presents to the ED complaining of left flank pain onset 5 days ago. Associated symptoms include cough, weight loss and SOB. The pt states his flank mostly hurts when he coughs. Denies fever, nausea, vomiting, hematuria or dysuria. Denies throat pain or abnormal BMs.    The history is provided by the patient. No  was used.   Flank Pain  This is a new problem. Episode onset: 5 days ago. The problem has not changed since onset.Associated symptoms include shortness of breath. Pertinent negatives include no chest pain and no headaches. Abdominal pain: left flank. Associated symptoms comments: cough. The symptoms are aggravated by coughing.     Review of patient's allergies indicates:  No Known Allergies  Past Medical History:   Diagnosis Date    Hypercholesteremia     Hypertension     Lung cancer     Unspecified dementia without behavioral disturbance      Past Surgical History:   Procedure Laterality Date    BACK SURGERY       No family history on file.     Review of Systems   Constitutional: Negative for chills, diaphoresis and fever.        Weight loss   HENT: Negative for congestion and sore throat.    Eyes: Negative for visual disturbance.   Respiratory: Positive for cough and shortness of breath.    Cardiovascular: Negative for chest pain and palpitations.   Gastrointestinal: Negative for constipation, diarrhea, nausea and vomiting. Abdominal pain: left flank.   Genitourinary: Positive for flank pain (left). Negative for dysuria and hematuria.   Skin:  Negative for rash.   Neurological: Negative for syncope, weakness, numbness and headaches.   All other systems reviewed and are negative.      Physical Exam     Initial Vitals [05/10/22 1244]   BP Pulse Resp Temp SpO2   106/75 100 18 99.5 °F (37.5 °C) 97 %      MAP       --         Physical Exam    Nursing note and vitals reviewed.  Constitutional: He appears well-developed and well-nourished. He is not diaphoretic. He does not appear ill. No distress.   HENT:   Head: Normocephalic and atraumatic.   Right Ear: External ear normal.   Left Ear: External ear normal.   Nose: Nose normal.   Mouth/Throat: Oropharynx is clear and moist.   Eyes: Conjunctivae and EOM are normal. Pupils are equal, round, and reactive to light.   Neck: Neck supple. No tracheal deviation present.   Normal range of motion.  Cardiovascular: Normal rate, regular rhythm, normal heart sounds and intact distal pulses.   No murmur heard.  Pulmonary/Chest: Breath sounds normal. No respiratory distress. He has no wheezes. He has no rhonchi. He has no rales.   Left lateral rib tenderness   Abdominal: Abdomen is soft. Bowel sounds are normal. He exhibits no distension. There is abdominal tenderness (left flank).   No right CVA tenderness.  No left CVA tenderness.   Musculoskeletal:         General: No edema. Normal range of motion.      Cervical back: Normal range of motion and neck supple.      Lumbar back: Normal. No tenderness. Normal range of motion.     Neurological: He is alert and oriented to person, place, and time. He has normal strength. No cranial nerve deficit or sensory deficit. GCS score is 15. GCS eye subscore is 4. GCS verbal subscore is 5. GCS motor subscore is 6.   Skin: Skin is warm and dry. Capillary refill takes less than 2 seconds.   Psychiatric: He has a normal mood and affect. His mood appears not anxious.         ED Course   Procedures  Labs Reviewed   COMPREHENSIVE METABOLIC PANEL - Abnormal; Notable for the following components:        Result Value    Chloride 108 (*)     Creatinine 1.37 (*)     Calcium Level Total 8.6 (*)     Albumin Level 3.2 (*)     Albumin/Globulin Ratio 1.0 (*)     All other components within normal limits   URINALYSIS, REFLEX TO URINE CULTURE - Abnormal; Notable for the following components:    Color, UA Dark Yellow (*)     Specific Gravity, UA >=1.040 (*)     Protein, UA 2+ (*)     Ketones, UA Trace (*)     Blood, UA Trace (*)     Bilirubin, UA 1+ (*)     Leukocyte Esterase, UA 1+ (*)     All other components within normal limits   CBC WITH DIFFERENTIAL - Abnormal; Notable for the following components:    Hgb 13.3 (*)     MCV 77.9 (*)     MCH 23.9 (*)     MCHC 30.7 (*)     IG# 0.04 (*)     IG% 0.5 (*)     All other components within normal limits   LIPASE - Normal   URINALYSIS, MICROSCOPIC - Normal   CBC W/ AUTO DIFFERENTIAL    Narrative:     The following orders were created for panel order CBC Auto Differential.  Procedure                               Abnormality         Status                     ---------                               -----------         ------                     CBC with Differential[[id number]]        Abnormal            Final result                 Please view results for these tests on the individual orders.          Imaging Results          CT Abdomen Pelvis  Without Contrast (Final result)  Result time 05/10/22 20:16:21    Final result by Uri Lugo MD (05/10/22 20:16:21)                 Impression:      No acute obstructive or inflammatory findings identified.      Electronically signed by: Uri Lugo  Date:    05/10/2022  Time:    20:16             Narrative:    EXAMINATION:  CT ABDOMEN PELVIS WITHOUT CONTRAST    CLINICAL HISTORY:  Flank pain, kidney stone suspected;left flank/lateral lower chest wall tenderness;    TECHNIQUE:  Multidetector axial images were obtained from the  diaphragms to below symphysis pubis without the administration of IV contrast. Oral contrast was not  administered.    Dose length product of 406 mGycm. Automated exposure control was utilized to minimize radiation dose.    COMPARISON:  CT chest July 21, 2020.  Patient's previous PET scan August 10, 2021 is not available on Epic.  None of the previous reports are also not available on [facility]    FINDINGS:  Included lungs small opacity about the left interlobar fissure which has somewhat nodular appearance on image 1 series 3.  In this location, on the previous CT chest there were focal infiltrate.  Attention to follow up exam.   No acute air space infiltrates or fluid within the pleural spaces.    Within limitations of noncontrast technique, no acute findings of the liver, pancreas and spleen identified. Gallbladder wall is not thickened and there is no intraluminal calcified calculus. No apparent biliary dilation.    The adrenal glands noncontrast evaluation is unremarkable.  Kidneys show minimal lobular contour and there are nonspecific mild perinephric strandings.  There is no hydronephrosis or nephrolithiasis. The ureters appear normal in course and diameter without intra ureteral stone.    There is a small hiatal hernia.  Stomach is decompressed and difficult to assess.  Small bowel is normal in caliber. There is no free air or free fluid. The appendix is not visualized.  There is noninflamed diverticulosis coli involving the distal descending and the sigmoid colon.  Pericolonic fat is preserved without acute inflammatory strandings. There is no evidence for bowel obstruction.    Urinary bladder appears within normal limits. No intravesical stone identified.  Prostate is enlarged and contains several radiation seeds and calcifications.  There is no pelvic free fluid.    There are degenerative changes.  No acute or aggressive skeletal abnormality.                               X-Ray Chest PA And Lateral (Final result)  Result time 05/10/22 13:14:29    Final result by Bandar Langston MD (05/10/22 13:14:29)                  Impression:      No acute chest disease is identified.      Electronically signed by: Bandarabe Langston  Date:    05/10/2022  Time:    13:14             Narrative:    EXAMINATION:  XR CHEST PA AND LATERAL    CLINICAL HISTORY:  chest pain;, .    FINDINGS:  No alveolar consolidation, effusion, or pneumothorax is seen.   The thoracic aorta is ectatic and calcified, but otherwise the  cardiac silhouette, central pulmonary vessels and mediastinum are normal in size and are grossly unremarkable. Except for degenerative changes, the  visualized osseous structures are grossly unremarkable.    Some fibrotic streaks identified at the left base                              X-Rays:   Independently Interpreted Readings:   Other Readings:  No acute findings    Medications   methocarbamoL injection 1,000 mg (1,000 mg Intravenous Given 5/10/22 1945)     Medical Decision Making:   Initial Assessment:   80 yo male with left flank pain after coughing   Independently Interpreted Test(s):   I have ordered and independently interpreted X-rays - see prior notes.  Clinical Tests:   Lab Tests: Ordered and Reviewed  Radiological Study: Ordered and Reviewed  Medical Tests: Ordered and Reviewed  ED Management:  Left lateral chest tenderness on exam, labs unremarkable. Feeling better after IV Robaxin. Will treat as muscle strain.           Scribe Attestation:   Scribe #1: I performed the above scribed service and the documentation accurately describes the services I performed. I attest to the accuracy of the note.    Attending Attestation:           Physician Attestation for Scribe:  Physician Attestation Statement for Scribe #1: I, Chetna Sommer MD, reviewed documentation, as scribed by Citlali Aviles in my presence, and it is both accurate and complete.             ED Course as of 05/11/22 0013   Tue May 10, 2022   1836 Creatinine(!): 1.37  At baseline  [KM]      ED Course User Index  [KM] Chetna Sommer MD                  Clinical  Impression:   Final diagnoses:  [R10.9] Left flank pain  [J20.9] Acute bronchitis, unspecified organism  [S29.011A] Muscle strain of chest wall, initial encounter (Primary)          ED Disposition Condition    Discharge Stable        ED Prescriptions     Medication Sig Dispense Start Date End Date Auth. Provider    methocarbamoL (ROBAXIN) 500 MG Tab Take 2 tablets (1,000 mg total) by mouth 3 (three) times daily as needed (pain). 30 tablet 5/11/2022 5/16/2022 Chetna Sommer MD    benzonatate (TESSALON) 100 MG capsule Take 1 capsule (100 mg total) by mouth 3 (three) times daily as needed for Cough. 20 capsule 5/11/2022 5/21/2022 Chetna Sommer MD        Follow-up Information     Follow up With Specialties Details Why Contact Info    [facility] - Emergency Dept Emergency Medicine  As needed, If symptoms worsen [address] [facility] [place] [address] [phone]    Jamar Lenz, DO Internal Medicine Schedule an appointment as soon as possible for a visit   [address] [place] [address] [phone]             Chetna Sommer MD  05/11/22 0013

## 2022-05-11 VITALS
HEART RATE: 81 BPM | TEMPERATURE: 100 F | OXYGEN SATURATION: 98 % | SYSTOLIC BLOOD PRESSURE: 127 MMHG | RESPIRATION RATE: 24 BRPM | DIASTOLIC BLOOD PRESSURE: 88 MMHG

## 2022-05-11 LAB
BACTERIA #/AREA URNS AUTO: NORMAL /HPF
RBC #/AREA URNS AUTO: NORMAL /HPF
SQUAMOUS #/AREA URNS AUTO: NORMAL /LPF
WBC #/AREA URNS AUTO: NORMAL /HPF

## 2022-05-11 RX ORDER — BENZONATATE 100 MG/1
100 CAPSULE ORAL 3 TIMES DAILY PRN
Qty: 20 CAPSULE | Refills: 0 | Status: SHIPPED | OUTPATIENT
Start: 2022-05-11 | End: 2022-05-21

## 2022-05-11 RX ORDER — METHOCARBAMOL 500 MG/1
1000 TABLET, FILM COATED ORAL 3 TIMES DAILY PRN
Qty: 30 TABLET | Refills: 0 | Status: SHIPPED | OUTPATIENT
Start: 2022-05-11 | End: 2022-05-16

## 2022-07-11 ENCOUNTER — HOSPITAL ENCOUNTER (EMERGENCY)
Facility: HOSPITAL | Age: 82
Discharge: HOME | End: 2022-07-11
Attending: EMERGENCY MEDICINE
Payer: COMMERCIAL

## 2022-07-11 VITALS
HEART RATE: 79 BPM | SYSTOLIC BLOOD PRESSURE: 101 MMHG | TEMPERATURE: 99 F | DIASTOLIC BLOOD PRESSURE: 64 MMHG | OXYGEN SATURATION: 100 % | HEIGHT: 65 IN | BODY MASS INDEX: 23.88 KG/M2 | RESPIRATION RATE: 18 BRPM | WEIGHT: 143.31 LBS

## 2022-07-11 DIAGNOSIS — U07.1 COVID-19 VIRUS DETECTED: ICD-10-CM

## 2022-07-11 DIAGNOSIS — R05.9 COUGH: ICD-10-CM

## 2022-07-11 DIAGNOSIS — U07.1 COVID: Primary | ICD-10-CM

## 2022-07-11 LAB
FLUAV AG UPPER RESP QL IA.RAPID: NOT DETECTED
FLUBV AG UPPER RESP QL IA.RAPID: NOT DETECTED
SARS-COV-2 RNA RESP QL NAA+PROBE: DETECTED
STREP A PCR (OHS): NOT DETECTED

## 2022-07-11 PROCEDURE — 87636 SARSCOV2 & INF A&B AMP PRB: CPT | Performed by: NURSE PRACTITIONER

## 2022-07-11 PROCEDURE — 99284 EMERGENCY DEPT VISIT MOD MDM: CPT | Mod: 25

## 2022-07-11 PROCEDURE — 87631 RESP VIRUS 3-5 TARGETS: CPT | Performed by: NURSE PRACTITIONER

## 2022-07-11 RX ORDER — BENZONATATE 100 MG/1
100 CAPSULE ORAL 3 TIMES DAILY PRN
Qty: 15 CAPSULE | Refills: 0 | Status: SHIPPED | OUTPATIENT
Start: 2022-07-11 | End: 2022-07-21

## 2022-07-11 NOTE — ED PROVIDER NOTES
Encounter Date: 7/11/2022       History     Chief Complaint   Patient presents with    Cough    Fever     C/o cough, fever since yesterday. States wife had covid last week    COVID-19 Concerns     The patient presents with occas nonprod cough, body aches, subjective fever, no cp or sob, known covid-19 exposure - has received covid vaccination.  The onset was 2 days ago.  The course/duration of symptoms is constant.  The degree at present is minimal.  The exacerbating factor is none.  The relieving factor is none.  Risk factors consist of none.  Prior episodes: occasional.  Associated symptoms: dry cough, nasal congestion, denies chest pain and denies shortness of breath.  Additional history: none.             Review of patient's allergies indicates:  No Known Allergies  Past Medical History:   Diagnosis Date    Hypercholesteremia     Hypertension     Lung cancer     Unspecified dementia without behavioral disturbance      Past Surgical History:   Procedure Laterality Date    BACK SURGERY       History reviewed. No pertinent family history.  Social History     Tobacco Use    Smoking status: Current Every Day Smoker     Packs/day: 1.00    Smokeless tobacco: Never Used   Substance Use Topics    Alcohol use: Never    Drug use: Never     Review of Systems   Constitutional: Positive for fever.   HENT: Positive for congestion. Negative for sore throat.    Respiratory: Positive for cough. Negative for shortness of breath.    Cardiovascular: Negative for chest pain.   Gastrointestinal: Negative for nausea.   Genitourinary: Negative for dysuria.   Musculoskeletal: Negative for back pain.   Skin: Negative for rash.   Neurological: Negative for weakness.   Hematological: Does not bruise/bleed easily.   All other systems reviewed and are negative.      Physical Exam     Initial Vitals [07/11/22 0715]   BP Pulse Resp Temp SpO2   101/64 107 (!) 24 100 °F (37.8 °C) 97 %      MAP       --         Physical Exam    Nursing  note and vitals reviewed.  Constitutional: He appears well-developed and well-nourished.   HENT:   Head: Normocephalic and atraumatic.   Right Ear: Tympanic membrane normal.   Left Ear: Tympanic membrane normal.   Nose: Nose normal.   Mouth/Throat: Uvula is midline, oropharynx is clear and moist and mucous membranes are normal.   Neck: Neck supple.   Normal range of motion.  Cardiovascular: Normal rate, regular rhythm, normal heart sounds and intact distal pulses.   Pulmonary/Chest: Breath sounds normal.   Abdominal: Abdomen is soft. Bowel sounds are normal.   Musculoskeletal:         General: Normal range of motion.      Cervical back: Normal range of motion and neck supple.     Neurological: He is alert and oriented to person, place, and time. He has normal strength.   Skin: Skin is warm and dry.   Psychiatric: He has a normal mood and affect.         ED Course   Procedures  Labs Reviewed   COVID/FLU A&B PCR - Abnormal; Notable for the following components:       Result Value    SARS-CoV-2 PCR Detected (*)     All other components within normal limits   STREP GROUP A BY PCR - Normal          Imaging Results          X-Ray Chest AP Portable (Final result)  Result time 07/11/22 08:20:12    Final result by Amanda Meng MD (07/11/22 08:20:12)                 Impression:      No acute thoracic abnormality.    No significant interval change.      Electronically signed by: Amanda Meng  Date:    07/11/2022  Time:    08:20             Narrative:    EXAMINATION:  XR CHEST AP PORTABLE    CLINICAL HISTORY:  ;Cough, unspecified    COMPARISON:  05/10/2022    FINDINGS:  No focal consolidations, pleural effusions or pneumothoraces.    Heart size normal.  Ectatic thoracic aorta..    No acute bony pathology.    Soft tissues within normal limits.                                 Medications - No data to display  Medical Decision Making:   History:   Old Records Summarized: records from clinic visits and records from previous  admission(s).  Clinical Tests:   Lab Tests: Ordered and Reviewed  Re-eval at 0915: resp even/nonlabored, RR20, BBS clear, O2 sat .97, will f/u with pcp, strict return to ER and covid precautions given    9:17 AM DISPOSITION: The patient is resting comfortably in no acute distress.  He is hemodynamically stable and is without objective evidence for acute process requiring urgent intervention or hospitalization. I provided counseling to patient with regard to condition, the treatment plan, specific conditions for return, and the importance of follow up. Detailed written and verbal instructions provided to patient and he expressed a verbal understanding of the discharge instructions and overall management plan. Reiterated the importance of medication administration and safety and advised patient to follow up with primary care provider in 3-5 days or sooner if needed.  Answered questions at this time. The patient is stable for discharge.                       Clinical Impression:   Final diagnoses:  [R05.9] Cough  [U07.1] COVID (Primary)          ED Disposition Condition    Discharge Stable        ED Prescriptions     Medication Sig Dispense Start Date End Date Auth. Provider    benzonatate (TESSALON) 100 MG capsule Take 1 capsule (100 mg total) by mouth 3 (three) times daily as needed for Cough. 15 capsule 7/11/2022 7/21/2022 ALANA Pettit        Follow-up Information     Follow up With Specialties Details Why Contact Info    follow up with your pcp in 3-5 days        [facility] - Emergency Dept Emergency Medicine  If symptoms worsen [address] [facility] [address]  [phone]           ALANA Pettit  07/11/22 9765

## 2022-07-12 ENCOUNTER — TELEPHONE (OUTPATIENT)
Dept: HEMATOLOGY/ONCOLOGY | Facility: CLINIC | Age: 82
End: 2022-07-12
Payer: MEDICAID

## 2022-08-02 ENCOUNTER — ANESTHESIA EVENT (OUTPATIENT)
Dept: ENDOSCOPY | Facility: HOSPITAL | Age: 82
End: 2022-08-02
Payer: COMMERCIAL

## 2022-08-02 ENCOUNTER — ANESTHESIA (OUTPATIENT)
Dept: ENDOSCOPY | Facility: HOSPITAL | Age: 82
End: 2022-08-02
Payer: COMMERCIAL

## 2022-08-02 ENCOUNTER — HOSPITAL ENCOUNTER (OUTPATIENT)
Facility: HOSPITAL | Age: 82
Discharge: HOME | End: 2022-08-02
Attending: INTERNAL MEDICINE | Admitting: INTERNAL MEDICINE
Payer: COMMERCIAL

## 2022-08-02 VITALS
HEART RATE: 78 BPM | SYSTOLIC BLOOD PRESSURE: 142 MMHG | OXYGEN SATURATION: 98 % | HEIGHT: 65 IN | WEIGHT: 149 LBS | BODY MASS INDEX: 24.83 KG/M2 | RESPIRATION RATE: 17 BRPM | DIASTOLIC BLOOD PRESSURE: 78 MMHG | TEMPERATURE: 98 F

## 2022-08-02 DIAGNOSIS — C15.9 SQUAMOUS CELL CARCINOMA OF ESOPHAGUS: ICD-10-CM

## 2022-08-02 DIAGNOSIS — K20.80 RADIATION ESOPHAGITIS: ICD-10-CM

## 2022-08-02 DIAGNOSIS — R13.10 DYSPHAGIA, UNSPECIFIED TYPE: ICD-10-CM

## 2022-08-02 DIAGNOSIS — T66.XXXA RADIATION ESOPHAGITIS: ICD-10-CM

## 2022-08-02 PROCEDURE — 27201423 OPTIME MED/SURG SUP & DEVICES STERILE SUPPLY: Performed by: INTERNAL MEDICINE

## 2022-08-02 PROCEDURE — 63600175 PHARM REV CODE 636 W HCPCS: Performed by: NURSE ANESTHETIST, CERTIFIED REGISTERED

## 2022-08-02 PROCEDURE — 25000003 PHARM REV CODE 250: Performed by: ANESTHESIOLOGY

## 2022-08-02 PROCEDURE — 37000008 HC ANESTHESIA 1ST 15 MINUTES: Performed by: INTERNAL MEDICINE

## 2022-08-02 PROCEDURE — 37000009 HC ANESTHESIA EA ADD 15 MINS: Performed by: INTERNAL MEDICINE

## 2022-08-02 PROCEDURE — 25000003 PHARM REV CODE 250: Performed by: NURSE ANESTHETIST, CERTIFIED REGISTERED

## 2022-08-02 PROCEDURE — 43239 EGD BIOPSY SINGLE/MULTIPLE: CPT | Mod: 59 | Performed by: INTERNAL MEDICINE

## 2022-08-02 PROCEDURE — 43248 EGD GUIDE WIRE INSERTION: CPT | Performed by: INTERNAL MEDICINE

## 2022-08-02 RX ORDER — SACUBITRIL AND VALSARTAN 49; 51 MG/1; MG/1
1 TABLET, FILM COATED ORAL 2 TIMES DAILY
COMMUNITY

## 2022-08-02 RX ORDER — CARVEDILOL 3.12 MG/1
3.12 TABLET ORAL 2 TIMES DAILY WITH MEALS
COMMUNITY

## 2022-08-02 RX ORDER — LIDOCAINE HYDROCHLORIDE 10 MG/ML
1 INJECTION, SOLUTION EPIDURAL; INFILTRATION; INTRACAUDAL; PERINEURAL ONCE
Status: CANCELLED | OUTPATIENT
Start: 2022-08-02 | End: 2022-08-02

## 2022-08-02 RX ORDER — ATORVASTATIN CALCIUM 20 MG/1
20 TABLET, FILM COATED ORAL DAILY
Status: ON HOLD | COMMUNITY
End: 2023-01-01 | Stop reason: HOSPADM

## 2022-08-02 RX ORDER — LIDOCAINE HCL/PF 100 MG/5ML
SYRINGE (ML) INTRAVENOUS
Status: DISCONTINUED | OUTPATIENT
Start: 2022-08-02 | End: 2022-08-02

## 2022-08-02 RX ORDER — LIDOCAINE HYDROCHLORIDE 20 MG/ML
INJECTION, SOLUTION EPIDURAL; INFILTRATION; INTRACAUDAL; PERINEURAL
Status: DISCONTINUED
Start: 2022-08-02 | End: 2022-08-02 | Stop reason: HOSPADM

## 2022-08-02 RX ORDER — PROPOFOL 10 MG/ML
VIAL (ML) INTRAVENOUS
Status: DISCONTINUED
Start: 2022-08-02 | End: 2022-08-02 | Stop reason: HOSPADM

## 2022-08-02 RX ORDER — ALBUTEROL SULFATE 90 UG/1
2 AEROSOL, METERED RESPIRATORY (INHALATION) EVERY 6 HOURS PRN
COMMUNITY

## 2022-08-02 RX ORDER — DONEPEZIL HYDROCHLORIDE 10 MG/1
10 TABLET, FILM COATED ORAL NIGHTLY
COMMUNITY

## 2022-08-02 RX ORDER — FLUTICASONE PROPIONATE 220 UG/1
AEROSOL, METERED RESPIRATORY (INHALATION) 2 TIMES DAILY
COMMUNITY

## 2022-08-02 RX ORDER — SODIUM CHLORIDE, SODIUM LACTATE, POTASSIUM CHLORIDE, CALCIUM CHLORIDE 600; 310; 30; 20 MG/100ML; MG/100ML; MG/100ML; MG/100ML
INJECTION, SOLUTION INTRAVENOUS CONTINUOUS PRN
Status: DISCONTINUED | OUTPATIENT
Start: 2022-08-02 | End: 2022-08-02

## 2022-08-02 RX ORDER — SODIUM CHLORIDE, SODIUM GLUCONATE, SODIUM ACETATE, POTASSIUM CHLORIDE AND MAGNESIUM CHLORIDE 30; 37; 368; 526; 502 MG/100ML; MG/100ML; MG/100ML; MG/100ML; MG/100ML
1000 INJECTION, SOLUTION INTRAVENOUS CONTINUOUS
Status: DISCONTINUED | OUTPATIENT
Start: 2022-08-02 | End: 2022-08-02 | Stop reason: HOSPADM

## 2022-08-02 RX ORDER — AMLODIPINE BESYLATE 5 MG/1
5 TABLET ORAL DAILY
Status: ON HOLD | COMMUNITY
End: 2023-01-01 | Stop reason: HOSPADM

## 2022-08-02 RX ORDER — OMEPRAZOLE 20 MG/1
CAPSULE, DELAYED RELEASE ORAL DAILY
COMMUNITY

## 2022-08-02 RX ORDER — SODIUM CHLORIDE, SODIUM GLUCONATE, SODIUM ACETATE, POTASSIUM CHLORIDE AND MAGNESIUM CHLORIDE 30; 37; 368; 526; 502 MG/100ML; MG/100ML; MG/100ML; MG/100ML; MG/100ML
1000 INJECTION, SOLUTION INTRAVENOUS CONTINUOUS
Status: CANCELLED | OUTPATIENT
Start: 2022-08-02 | End: 2022-09-01

## 2022-08-02 RX ORDER — PROPOFOL 10 MG/ML
VIAL (ML) INTRAVENOUS
Status: DISCONTINUED | OUTPATIENT
Start: 2022-08-02 | End: 2022-08-02

## 2022-08-02 RX ORDER — FLUCONAZOLE 100 MG/1
100 TABLET ORAL DAILY
Status: ON HOLD | COMMUNITY
End: 2023-01-01 | Stop reason: HOSPADM

## 2022-08-02 RX ORDER — CLOPIDOGREL BISULFATE 75 MG/1
75 TABLET ORAL DAILY
Status: ON HOLD | COMMUNITY
End: 2023-01-01 | Stop reason: HOSPADM

## 2022-08-02 RX ADMIN — SODIUM CHLORIDE, SODIUM GLUCONATE, SODIUM ACETATE, POTASSIUM CHLORIDE AND MAGNESIUM CHLORIDE 1000 ML: 526; 502; 368; 37; 30 INJECTION, SOLUTION INTRAVENOUS at 01:08

## 2022-08-02 RX ADMIN — PROPOFOL 200 MG: 10 INJECTION, EMULSION INTRAVENOUS at 03:08

## 2022-08-02 RX ADMIN — LIDOCAINE HYDROCHLORIDE 20 MG: 20 INJECTION, SOLUTION INTRAVENOUS at 03:08

## 2022-08-02 RX ADMIN — SODIUM CHLORIDE, POTASSIUM CHLORIDE, SODIUM LACTATE AND CALCIUM CHLORIDE: 600; 310; 30; 20 INJECTION, SOLUTION INTRAVENOUS at 03:08

## 2022-08-02 NOTE — ANESTHESIA PREPROCEDURE EVALUATION
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  Diagnosis:        Dysphagia, unspecified type       Squamous cell carcinoma of esophagus       Radiation esophagitis    He comes to [facility] for the noted procedure under GA/TIVA.  Procedure:   EGD w/ dil (N/A Abdomen)    PMHx:  Hypertension Lung cancer   Hypercholesteremia Unspecified dementia without behavioral disturbance           PSHx:    Surgical History    BACK SURGERY         Lab Data:    Vital signs:  Pre Vitals  Current as of 08/02/22 1357  BP: 136/82 Pulse: 80   Resp: 20 SpO2: 94   Temp:    Height: 5' 5" (1.651 m) (08/02/22) Weight: 67.6 kg (149 lb) (08/02/22)   BMI: 24.8 IBW: 61.5 kg (135 lb 9.3 oz)   Last edited 08/02/22 1240 by ERIN        Pre-op Assessment    I have reviewed the Patient Summary Reports.     I have reviewed the Nursing Notes. I have reviewed the NPO Status.   I have reviewed the Medications.     Review of Systems  Anesthesia Hx:  No problems with previous Anesthesia    Social:  Non-Smoker    Hematology/Oncology:  Hematology Normal   Oncology Normal     EENT/Dental:EENT/Dental Normal   Cardiovascular:   Exercise tolerance: good Hypertension  Functional Capacity good / => 4 METS    Pulmonary:  Pulmonary Normal    Renal/:  Renal/ Normal     Hepatic/GI:  Hepatic/GI Normal    Musculoskeletal:  Musculoskeletal Normal    Neurological:  Neurology Normal    Endocrine:  Endocrine Normal    Dermatological:  Skin Normal    Psych:   Psychiatric History          Physical Exam  General: Alert, Oriented, Well nourished and Cooperative    Airway:  Mallampati: II   Mouth Opening: Normal  TM Distance: Normal  Tongue: Normal  Neck ROM: Normal ROM    Dental:  Edentulous    Chest/Lungs:  Clear to auscultation, Normal Respiratory " Rate    Heart:  Rate: Normal  Rhythm: Regular Rhythm        Anesthesia Plan  Type of Anesthesia, risks & benefits discussed:    Anesthesia Type: Gen Natural Airway  Intra-op Monitoring Plan: Standard ASA Monitors  Induction:  IV  Informed Consent: Informed consent signed with the Patient and all parties understand the risks and agree with anesthesia plan.  All questions answered.   ASA Score: 3  Day of Surgery Review of History & Physical: H&P Update referred to the surgeon/provider.    Ready For Surgery From Anesthesia Perspective.     .

## 2022-08-02 NOTE — TRANSFER OF CARE
"Anesthesia Transfer of Care Note    Patient: Ezequiel Merchant    Procedure(s) Performed: Procedure(s) (LRB):  EGD w/ dil (N/A)  EGD, WITH CLOSED BIOPSY  ESOPHAGOGASTRODUODENOSCOPY (EGD)- DEVICE ASSISTED (N/A)    Patient location: PACU    Anesthesia Type: general    Transport from OR: Transported from OR on room air with adequate spontaneous ventilation    Post pain: adequate analgesia    Post assessment: no apparent anesthetic complications    Post vital signs: stable    Level of consciousness: sedated and responds to stimulation    Nausea/Vomiting: no nausea/vomiting    Complications: none    Transfer of care protocol was followed      Last vitals:   Visit Vitals  /82 (BP Location: Left arm, Patient Position: Lying)   Pulse 80   Resp 20   Ht 5' 5" (1.651 m)   Wt 67.6 kg (149 lb)   SpO2 (!) 94%   BMI 24.79 kg/m²     "

## 2022-08-02 NOTE — H&P
Short Stay Endoscopy History and Physical    PCP - Jamar Lenz, DO    Procedure -   ASA &Mallampati - per anesthesia  History of Anesthesia problems - no  Family history Anesthesia problems - no     HPI:  This is a 81 y.o. male here for evaluation of dysphagia in the setting of SCC or the esophagus and recent treatment.    ROS:  Constitutional: No fevers, chills, No weight loss  ENT: No allergies  CV: No chest pain  Pulm: No shortness of breath  GI: see HPI  Derm: No rash    Medical History:  has a past medical history of Hypercholesteremia, Hypertension, Lung cancer, and Unspecified dementia without behavioral disturbance.    Surgical History:  has a past surgical history that includes Back surgery.    Family History: family history is not on file..     Social History:  reports that he has been smoking. He has been smoking about 1.00 pack per day. He has never used smokeless tobacco. He reports that he does not drink alcohol and does not use drugs.    Review of patient's allergies indicates:  No Known Allergies    Medications:   Medications Prior to Admission   Medication Sig Dispense Refill Last Dose    carvediloL (COREG) 3.125 MG tablet Take 3.125 mg by mouth 2 (two) times daily with meals.   8/2/2022 at Unknown time    albuterol (VENTOLIN HFA) 90 mcg/actuation inhaler Inhale 2 puffs into the lungs every 6 (six) hours as needed for Wheezing. Rescue       amLODIPine (NORVASC) 5 MG tablet Take 5 mg by mouth once daily.       atorvastatin (LIPITOR) 20 MG tablet Take 20 mg by mouth once daily.       clopidogreL (PLAVIX) 75 mg tablet Take 75 mg by mouth once daily.   7/28/2022    donepeziL (ARICEPT) 10 MG tablet Take 10 mg by mouth every evening.       fluconazole (DIFLUCAN) 100 MG tablet Take 100 mg by mouth once daily.       fluticasone propionate (FLOVENT HFA) 220 mcg/actuation inhaler Inhale into the lungs 2 (two) times daily. Controller       omeprazole (PRILOSEC) 20 MG capsule Take by mouth once  daily.       sacubitriL-valsartan (ENTRESTO) 49-51 mg per tablet Take 1 tablet by mouth 2 (two) times daily.            Objective Findings:    Vital Signs: see nursing notes  Physical Exam:  General Appearance: Thin AAM, well appearing in no acute distress  Eyes:    No scleral icterus  ENT: Neck supple  Lungs: CTA anteriorly  Heart:  S1, S2 normal, no murmurs heard  Abdomen: Soft, non tender, non distended with positive bowel sounds. No hepatosplenomegaly, ascites, or mass  Extremities: no edema  Skin: No rash      Labs:  Lab Results   Component Value Date    WBC 8.2 05/10/2022    HGB 13.3 (L) 05/10/2022    HCT 43.3 05/10/2022     05/10/2022    ALT 8 05/10/2022    AST 14 05/10/2022     05/10/2022    K 4.0 05/10/2022    CREATININE 1.37 (H) 05/10/2022    BUN 14.8 05/10/2022    CO2 23 05/10/2022    PSA <0.1 04/23/2018    INR 1.0 07/14/2021       I have explained the risks and benefits of endoscopy procedures to the patient including but not limited to bleeding, perforation, infection, and death.    Pollo Hairston MD

## 2022-08-02 NOTE — PROVATION PATIENT INSTRUCTIONS
Discharge Summary/Instructions after an Endoscopic Procedure  Patient Name: Ezequiel Merchant  Patient MRN: [id number]  Patient YOB: 1940 Tuesday, August 2, 2022  Pollo Hairston MD  Dear patient,  As a result of recent federal legislation (The Federal Cures Act), you may   receive lab or pathology results from your procedure in your [facility]   account before your physician is able to contact you. Your physician or   their representative will relay the results to you with their   recommendations at their soonest availability.  Thank you,  RESTRICTIONS:  During your procedure today, you received medications for sedation.  These   medications may affect your judgment, balance and coordination.  Therefore,   for 24 hours, you have the following restrictions:   - DO NOT drive a car, operate machinery, make legal/financial decisions,   sign important papers or drink alcohol.    ACTIVITY:  Today: no heavy lifting, straining or running due to procedural   sedation/anesthesia.  The following day: return to full activity including work.  DIET:  Eat and drink normally unless instructed otherwise.     TREATMENT FOR COMMON SIDE EFFECTS:  - Mild abdominal pain, nausea, belching, bloating or excessive gas:  rest,   eat lightly and use a heating pad.  - Sore Throat: treat with throat lozenges and/or gargle with warm salt   water.  - Because air was used during the procedure, expelling large amounts of air   from your rectum or belching is normal.  - If a bowel prep was taken, you may not have a bowel movement for 1-3 days.    This is normal.  SYMPTOMS TO WATCH FOR AND REPORT TO YOUR PHYSICIAN:  1. Abdominal pain or bloating, other than gas cramps.  2. Chest pain.  3. Back pain.  4. Signs of infection such as: chills or fever occurring within 24 hours   after the procedure.  5. Rectal bleeding, which would show as bright red, maroon, or black stools.   (A tablespoon of blood from the rectum is not serious, especially  if   hemorrhoids are present.)  6. Vomiting.  7. Weakness or dizziness.  GO DIRECTLY TO THE NEAREST EMERGENCY ROOM IF YOU HAVE ANY OF THE FOLLOWING:      Difficulty breathing              Chills and/or fever over 101 F   Persistent vomiting and/or vomiting blood   Severe abdominal pain   Severe chest pain   Black, tarry stools   Bleeding- more than one tablespoon   Any other symptom or condition that you feel may need urgent attention  Your doctor recommends these additional instructions:  If any biopsies were taken, your doctors clinic will contact you in 1 to 2   weeks with any results.  - Discharge patient to home (with escort).   - Soft diet.   - Continue present medications.   - Use a proton pump inhibitor PO daily.   - Observe patient's clinical course.   - Await pathology results.   - If dysphagia persist despite today's dilation, then will need to perform   esophagram and consider esophageal manometry.  For questions, problems or results please call your physician - Pollo Hairston MD at Work:  [phone].  [facility], EMERGENCY ROOM PHONE NUMBER: [phone]  IF A COMPLICATION OR EMERGENCY SITUATION ARISES AND YOU ARE UNABLE TO REACH   YOUR PHYSICIAN - GO DIRECTLY TO THE EMERGENCY ROOM.  Pollo Hairston MD  8/2/2022 3:55:45 PM  This report has been verified and signed electronically.  Dear patient,  As a result of recent federal legislation (The Federal Cures Act), you may   receive lab or pathology results from your procedure in your [facility]   account before your physician is able to contact you. Your physician or   their representative will relay the results to you with their   recommendations at their soonest availability.  Thank you,  PROVATION

## 2022-08-03 NOTE — ANESTHESIA POSTPROCEDURE EVALUATION
Anesthesia Post Evaluation    Patient: Ezequiel Merchant    Procedure(s) Performed: Procedure(s) (LRB):  EGD w/ dil (N/A)  EGD, WITH CLOSED BIOPSY  ESOPHAGOGASTRODUODENOSCOPY (EGD)- DEVICE ASSISTED (N/A)    Final Anesthesia Type: general      Patient location during evaluation: PACU  Patient participation: Yes- Able to Participate  Level of consciousness: awake  Post-procedure vital signs: reviewed and stable  Pain management: adequate  Airway patency: patent    PONV status at discharge: vomiting (controlled) and nausea (controlled)  Anesthetic complications: no      Cardiovascular status: hemodynamically stable  Respiratory status: spontaneous ventilation and unassisted  Hydration status: euvolemic  Follow-up not needed.  Comments:              Vitals Value Taken Time   /78 08/02/22 1643   Temp 36.6 °C (97.9 °F) 08/02/22 1545   Pulse 78 08/02/22 1643   Resp 17 08/02/22 1643   SpO2 98 % 08/02/22 1643         No case tracking events are documented in the log.      Pain/Beth Score: Beth Score: 10 (8/2/2022  4:17 PM)

## 2022-08-04 LAB
DHEA SERPL-MCNC: NORMAL
ESTROGEN SERPL-MCNC: NORMAL PG/ML
INSULIN SERPL-ACNC: NORMAL U[IU]/ML
LAB AP CLINICAL INFORMATION: NORMAL
LAB AP GROSS DESCRIPTION: NORMAL
LAB AP REPORT FOOTNOTES: NORMAL
T3RU NFR SERPL: NORMAL %

## 2022-09-06 PROBLEM — C15.3: Status: ACTIVE | Noted: 2021-07-14

## 2022-09-06 PROBLEM — C15.3: Status: ACTIVE | Noted: 2022-09-06

## 2022-10-31 PROBLEM — R55 SYNCOPE: Status: ACTIVE | Noted: 2022-01-01
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
## 2022-10-31 NOTE — ASSESSMENT & PLAN NOTE
Syncope vs seizure    -await EEG results  -await TTE results   -will initiate AED pending EEG results  -seizure/fall precautions

## 2022-10-31 NOTE — H&P
[facility] Hospital Medicine History & Physical Examination       Patient Name: Ezequiel Merchant  MRN: [id number]  Patient Class: IP- Inpatient   Admission Date: 10/30/2022   Admitting Physician: He Bourgeois MD   Length of Stay: 0  Attending Physician: Lawson Leong MD  Primary Care Provider: Jamar Lenz DO  Face-to-Face encounter date: 10/31/2022  Code Status: Full Code  Chief Complaint: Loss of Consciousness (Patient had brief syncopal episode this evening, lasting a couple of minutes. Currently being treated with radiation for throat cancer.  )        Patient information was obtained from patient, patient's family, past medical records and ER records.     HISTORY OF PRESENT ILLNESS:   Ezequiel Merchant is a 82 y.o. Black or  male with a past medical history of hypertension, hyperlipidemia, coronary artery disease, peripheral vascular disease status post left stent on Plavix, current lung cancer status post radiation, current throat cancer status post radiation and prostate cancer in remission. The patient presented to [facility] on 10/30/2022 with a primary complaint of 2 episodes passing out which occurred yesterday (10/30/2022) morning and afternoon while lying down watching TV. Friend at bedside reports during syncopal episode there was noted to be shaking to right upper extremity.  She is unsure how long the episode occurred but reports patient was confused with mumbled speech following the event. Patient reports having shortness of breath with exertion but denies complaints of chest pain, fever, chills, abdominal pain, headache, dizziness, vision changes, weakness, nausea, vomiting and diarrhea. He eats a regular diet. At baseline patient ambulates with a walker, lives with friend who is at bedside and complete activity of daily living independently.    Upon presentation to the ED, afebrile, hemodynamically stable and SpO2 100% on room  air. Labs with BUN/creatinine 14.8/1.47 (BUN/creatinine 14.8/1.37 in May 2022), troponin 0.012.  Influenza a and B and SARS-CoV-2 PCR negative.  UA with 6 squamous epithelial cells, no bacteria, 50 WBC, 33 RBC, 1+ leukocyte esterase, 2+ occult blood, 1+ ketones and 1+ protein.  EKG with normal sinus rhythm, T-wave abnormalities considering lateral ischemia and unable to rule out inferior infarct. Chest XR with no acute process. CT Head with no acute findings.  In the ED patient received full-dose aspirin and a 1000 mg of Keppra.  Patient admitted to hospital medicine services and further medical management.    PAST MEDICAL HISTORY:   Hypertension  Hyperlipidemia  Coronary artery disease  Peripheral vascular disease  GERD  Lung cancer s/p radiation  Prostate cancer   Throat cancer s/p radiation  Dementia    PAST SURGICAL HISTORY:     Past Surgical History:   Procedure Laterality Date    BACK SURGERY      CORONARY STENT PLACEMENT      DEBRIDEMENT OF FOOT Left     ESOPHAGOGASTRODUODENOSCOPY N/A 08/02/2022    Procedure: EGD w/ dil;  Surgeon: Pollo Hairston MD;  Location: [facility] ENDOSCOPY;  Service: Gastroenterology;  Laterality: N/A;    gastrointestinal biopsy      TOE AMPUTATION Left        ALLERGIES:   Patient has no known allergies.    FAMILY HISTORY:   Reviewed and negative    SOCIAL HISTORY:   Tobacco - Current smoker; 1 pack per day  Alcohol - Former 1/2 pint of Gin daily; Quit 2 months ago  Illicit Drugs - Denies    HOME MEDICATIONS:     Prior to Admission medications    Medication Sig Start Date End Date Taking? Authorizing Provider   albuterol (PROVENTIL/VENTOLIN HFA) 90 mcg/actuation inhaler Inhale 2 puffs into the lungs every 6 (six) hours as needed for Wheezing. Rescue   Yes Historical Provider   amLODIPine (NORVASC) 5 MG tablet Take 5 mg by mouth once daily.   Yes Historical Provider   atorvastatin (LIPITOR) 20 MG tablet Take 20 mg by mouth once daily.   Yes Historical Provider   carvediloL (COREG)  3.125 MG tablet Take 3.125 mg by mouth 2 (two) times daily with meals.   Yes Historical Provider   clopidogreL (PLAVIX) 75 mg tablet Take 75 mg by mouth once daily.   Yes Historical Provider   donepeziL (ARICEPT) 10 MG tablet Take 10 mg by mouth every evening.   Yes Historical Provider   omeprazole (PRILOSEC) 20 MG capsule Take by mouth once daily.   Yes Historical Provider   sacubitriL-valsartan (ENTRESTO) 49-51 mg per tablet Take 1 tablet by mouth 2 (two) times daily.   Yes Historical Provider   fluconazole (DIFLUCAN) 100 MG tablet Take 100 mg by mouth once daily.    Historical Provider   fluticasone propionate (FLOVENT HFA) 220 mcg/actuation inhaler Inhale into the lungs 2 (two) times daily. Controller    Historical Provider       REVIEW OF SYSTEMS:   Except as documented, all other systems reviewed and negative     PHYSICAL EXAM:     VITAL SIGNS: 24 HRS MIN & MAX LAST   Temp  Min: 98.1 °F (36.7 °C)  Max: 98.1 °F (36.7 °C) 98.1 °F (36.7 °C)   BP  Min: 103/69  Max: 146/81 (!) 145/80     Pulse  Min: 61  Max: 81  63   Resp  Min: 16  Max: 20 17   SpO2  Min: 95 %  Max: 100 % 95 %       General appearance: Well-developed, well-nourished male in no apparent distress. Friend at bedside.  HEENT: Atraumatic head. Dry  mucous membranes of oral cavity.  Lungs: Clear to auscultation bilaterally.   Heart: Regular rate and rhythm.   Abdomen: Soft, non-distended.  Extremities: No cyanosis, clubbing. No deformities.  Skin: No Rash. Warm and dry.  Neuro: Awake, alert and oriented to per and city. 5/5 strength to extremities.  Psych/mental status: Appropriate mood and affect. Cooperative. Responds appropriately to questions.       LABS AND IMAGING:     Recent Labs   Lab 10/30/22  2113 10/31/22  0651   WBC 4.9 4.4*   RBC 6.42* 5.40   HGB 14.8 12.5*   HCT 48.7 40.5*   MCV 75.9* 75.0*   MCH 23.1* 23.1*   MCHC 30.4* 30.9*   RDW 16.3 15.1    180   MPV 9.3 9.6       Recent Labs   Lab 10/30/22  2113 10/31/22  0651    139   K  5.0 4.0   CO2 23 25   BUN 14.8 13.4   CREATININE 1.47* 1.24*   CALCIUM 9.3 8.4*   MG 2.10 1.80   ALBUMIN 3.7 2.9*   ALKPHOS 80 61   ALT 16 13   AST 18 15   BILITOT 0.7 0.8       Microbiology Results (last 7 days)       Procedure Component Value Units Date/Time    Urine culture [[id number]] Collected: 10/31/22 0056    Order Status: Sent Specimen: Urine Updated: 10/31/22 0349    Blood culture #1 **CANNOT BE ORDERED STAT** [[id number]] Collected: 10/30/22 2113    Order Status: Resulted Specimen: Blood from Arm, Right Updated: 10/30/22 2143    Blood culture #2 **CANNOT BE ORDERED STAT** [[id number]] Collected: 10/30/22 2113    Order Status: Resulted Specimen: Blood from Arm, Left Updated: 10/30/22 2143             X-Ray Chest AP Portable  Narrative: EXAMINATION:  XR CHEST AP PORTABLE    CLINICAL HISTORY:  fever;    TECHNIQUE:  Single view of the chest    COMPARISON:  07/11/2022    FINDINGS:  No focal opacification, pleural effusion, or pneumothorax.    The cardiomediastinal silhouette is within normal limits.    No acute osseous abnormality.  Impression: No acute cardiopulmonary process.    Electronically signed by: Rakesh Mena  Date:    10/31/2022  Time:    07:19  CT Head Without Contrast  Narrative: Technique:CT of the head was performed without intravenous contrast with axial as well as coronal and sagittal images.    Dosage Information:Automated Exposure Control was utilized 1812.33 mGy.cm.    Clinical history:AMS, unknown cause, +LOC.    Findings:    Hemorrhage:No acute intracranial hemorrhage is seen.    CSF spaces:The ventricles, sulci and basal cisterns all appear mildly prominent suggesting an element of global cerebral atrophy.    Brain parenchyma:There is no acute large vessel territory infarct.  Pronounced microvascular change is seen in portions of the periventricular and deep white matter tracts. A hypodense focus is seen in the right basal ganglia extending to the right corona radiata (series 2 images 14  to 17). This may reflect a chronic infarct.    Cerebellum:Encephalomalacic changes are seen in the bilateral cerebellar hemispheres.    Vascular:Unremarkable venous sinuses. Mild to moderate atheromatous calcification of the intracranial arteries is seen.    Calvarium:No acute linear or depressed skull fracture is seen.    Maxillofacial Structures:    Paranasal sinuses:The visualized paranasal sinuses appear clear with no significant mucoperiosteal thickening or air fluid levels identified.    Orbits:The bilateral native lenses are absent.  There is some chronic appearing deformity of left lamina papyracea.  Impression: Impression:    No acute intracranial process identified. Details and other findings as noted above.    No significant discrepancy with overnight report.    Electronically signed by: Uri Lugo  Date:    10/31/2022  Time:    07:02        ASSESSMENT & PLAN:   Assessment:  Syncope versus CVA versus seizure  Acute kidney injury  Essential hypertension   Hyperlipidemia  Coronary artery disease   Lung cancer status post radiation  Throat cancer status post radiation  Prostate cancer  Tobacco use    Plan:  - Neurology consulted. Appreciate recommendations  - CVA work up:  > MRI Brain - ordered  > MRA Head and Neck - ordered  > Echo - ordered  > Carotid US - less than 50% stenosis right and left ICA and bilateral vertebral artery patent with antegrade flow  > Hemoglobin A1C - ordered  > Lipid Panel - within limits  > ESR 17, CRP - 1.65  > UDS -  > Physical, Occupation and Speech Therapy consulted  > Permissive hypertension, IV Labetalol and Hydralazine as needed for SBP >220 or DBP> 120  - EEG ordered  - Labs in AM      VTE Prophylaxis: will be placed on Lovenox for DVT prophylaxis and will be advised to be as mobile as possible and sit in a chair as tolerated      __________________________________________________________________________  INPATIENT LIST OF MEDICATIONS     Current Facility-Administered  Medications:     0.9%  NaCl infusion, , Intravenous, Continuous, Kathy Fuller AGACNP-BC, Last Rate: 75 mL/hr at 10/31/22 0628, New Bag at 10/31/22 0628    acetaminophen tablet 650 mg, 650 mg, Oral, Q6H PRN, Kathy Fuller, AGACNP-BC    enoxaparin injection 40 mg, 40 mg, Subcutaneous, Daily, Kathy Fuller AGACNP-BC    labetaloL injection 10 mg, 10 mg, Intravenous, Q2H PRN, Kathy Fuller, AGACNP-BC    ondansetron injection 4 mg, 4 mg, Intravenous, Q4H PRN, Kathy Fuller, AGACNP-BC    Current Outpatient Medications:     albuterol (PROVENTIL/VENTOLIN HFA) 90 mcg/actuation inhaler, Inhale 2 puffs into the lungs every 6 (six) hours as needed for Wheezing. Rescue, Disp: , Rfl:     amLODIPine (NORVASC) 5 MG tablet, Take 5 mg by mouth once daily., Disp: , Rfl:     atorvastatin (LIPITOR) 20 MG tablet, Take 20 mg by mouth once daily., Disp: , Rfl:     carvediloL (COREG) 3.125 MG tablet, Take 3.125 mg by mouth 2 (two) times daily with meals., Disp: , Rfl:     clopidogreL (PLAVIX) 75 mg tablet, Take 75 mg by mouth once daily., Disp: , Rfl:     donepeziL (ARICEPT) 10 MG tablet, Take 10 mg by mouth every evening., Disp: , Rfl:     omeprazole (PRILOSEC) 20 MG capsule, Take by mouth once daily., Disp: , Rfl:     sacubitriL-valsartan (ENTRESTO) 49-51 mg per tablet, Take 1 tablet by mouth 2 (two) times daily., Disp: , Rfl:     fluconazole (DIFLUCAN) 100 MG tablet, Take 100 mg by mouth once daily., Disp: , Rfl:     fluticasone propionate (FLOVENT HFA) 220 mcg/actuation inhaler, Inhale into the lungs 2 (two) times daily. Controller, Disp: , Rfl:       Scheduled Meds:   enoxaparin  40 mg Subcutaneous Daily     Continuous Infusions:   sodium chloride 0.9% 75 mL/hr at 10/31/22 0628     PRN Meds:.acetaminophen, labetalol, ondansetron      Discharge Planning and Disposition: Anticipated discharge to be determined.    IHellen PA, have reviewed and discussed the case with Dr. Lawson Leong.    Please see the following  addendum for further assessment and plan from there attending MD.    Hellen Bourgeois PA-C  10/31/2022

## 2022-10-31 NOTE — ED PROVIDER NOTES
"Encounter Date: 10/30/2022    SCRIBE #1 NOTE: I, Leo Marlow, am scribing for, and in the presence of,  Dr. Ch. I have scribed the following portions of the note - the EKG reading. Other sections scribed: HPI, ROS, Physical Exam, MDM, Attending.     History     Chief Complaint   Patient presents with    Loss of Consciousness     Patient had brief syncopal episode this evening, lasting a couple of minutes. Currently being treated with radiation for throat cancer.       83 y/o AAM with history of HTN, HLD, PVD, dementia, CAD, prostate cancer, lung cancer and throat cancer presents to ED for possible seizure around [id number] tonight per wife.  Wife states that they were lying in bed watching TV, when she noticed pt was not responding to her.  She says he raised his L arm up with a closed fist, and she could not pull it down.  She says the episodes lasted a few seconds, and pt became confused and started mumbling incoherently for a while after before returning to baseline.  She also says his mouth looked like it was "pulling to the side" during the episode.  Pt has been seen here before for a similar episode with no diagnosis per wife.  Pt is currently on radiation for his throat cancer.  He does not have history of CVA or seizure.  Pt's wife said he had no bladder incontinence and did not bite his tongue.  Pt denies SHETTY.  His PCP is Dr. Lenz.    The history is provided by the patient and the spouse.   Loss of Consciousness  This is a new problem. Episode onset: about 4-5 hours ago. The problem has been resolved. Pertinent negatives include no chest pain, no abdominal pain, no headaches and no shortness of breath.   Review of patient's allergies indicates:  No Known Allergies  Past Medical History:   Diagnosis Date    CAD in native artery     Dementia     DJD (degenerative joint disease)     GERD (gastroesophageal reflux disease)     Hallux valgus (acquired), unspecified foot     Hammer toe, acquired     Hx of " cancer of lung     Hypercholesteremia     Hypertension     Lung cancer     Onychomycosis of toenail     PAD (peripheral artery disease)     PVD (peripheral vascular disease)     Tinea pedis     Tobacco user     Unspecified dementia without behavioral disturbance      Past Surgical History:   Procedure Laterality Date    BACK SURGERY      CORONARY STENT PLACEMENT      DEBRIDEMENT OF FOOT Left     ESOPHAGOGASTRODUODENOSCOPY N/A 08/02/2022    Procedure: EGD w/ dil;  Surgeon: Pollo Hairston MD;  Location: [facility] ENDOSCOPY;  Service: Gastroenterology;  Laterality: N/A;    gastrointestinal biopsy      TOE AMPUTATION Left      Family History   Problem Relation Age of Onset    Stroke Mother     Diabetes Father      Social History     Tobacco Use    Smoking status: Every Day     Packs/day: 1.00     Types: Cigarettes    Smokeless tobacco: Never   Substance Use Topics    Alcohol use: Never    Drug use: Never     Review of Systems   Constitutional:  Negative for chills and fever.   HENT:  Negative for congestion and ear pain.    Eyes:  Negative for discharge.   Respiratory:  Negative for cough, shortness of breath and wheezing.    Cardiovascular:  Positive for syncope. Negative for chest pain and leg swelling.   Gastrointestinal:  Negative for abdominal pain, constipation, diarrhea, nausea and vomiting.   Genitourinary:  Negative for dysuria, flank pain and frequency.   Musculoskeletal:  Negative for back pain and joint swelling.   Skin:  Negative for rash.   Neurological:  Positive for seizures (possible), facial asymmetry and speech difficulty. Negative for dizziness, weakness and headaches.        Denies bladder incontinence   Psychiatric/Behavioral:  Negative for agitation, confusion and hallucinations.      Physical Exam     Initial Vitals [10/30/22 2039]   BP Pulse Resp Temp SpO2   103/69 74 18 98.1 °F (36.7 °C) 100 %      MAP       --         Physical Exam    Nursing note and vitals reviewed.  Constitutional: He  appears well-developed. No distress.   HENT:   Head: Normocephalic and atraumatic.   Fullness of L tonsillar pillar   Eyes: Conjunctivae and EOM are normal. Pupils are equal, round, and reactive to light.   Neck: Neck supple.   Cardiovascular:  Normal rate and regular rhythm.           No murmur heard.  Pulmonary/Chest: Breath sounds normal. No respiratory distress. He exhibits no tenderness.   Abdominal: Abdomen is soft. Bowel sounds are normal. He exhibits no distension. There is no abdominal tenderness.   Musculoskeletal:         General: Normal range of motion.      Cervical back: Neck supple.      Lumbar back: Normal. No tenderness. Normal range of motion.     Neurological: He is alert. He has normal strength. No cranial nerve deficit or sensory deficit.   Slight pronator drift on L side; no facial asymmetry; oriented to place but not time   Psychiatric: He has a normal mood and affect. Judgment normal.       ED Course   Procedures  Labs Reviewed   COMPREHENSIVE METABOLIC PANEL - Abnormal; Notable for the following components:       Result Value    Chloride 108 (*)     Creatinine 1.47 (*)     All other components within normal limits   URINALYSIS, REFLEX TO URINE CULTURE - Abnormal; Notable for the following components:    Protein, UA 1+ (*)     Ketones, UA 1+ (*)     Blood, UA 2+ (*)     Leukocyte Esterase, UA 1+ (*)     All other components within normal limits   APTT - Abnormal; Notable for the following components:    PTT 36.0 (*)     All other components within normal limits   CBC WITH DIFFERENTIAL - Abnormal; Notable for the following components:    RBC 6.42 (*)     MCV 75.9 (*)     MCH 23.1 (*)     MCHC 30.4 (*)     All other components within normal limits   URINALYSIS, MICROSCOPIC - Abnormal; Notable for the following components:    RBC, UA 33 (*)     WBC, UA 50 (*)     Squamous Epithelial Cells, UA 6 (*)     All other components within normal limits   COMPREHENSIVE METABOLIC PANEL - Abnormal; Notable  for the following components:    Chloride 108 (*)     Creatinine 1.24 (*)     Calcium Level Total 8.4 (*)     Protein Total 5.2 (*)     Albumin Level 2.9 (*)     Globulin 2.3 (*)     All other components within normal limits   SEDIMENTATION RATE - Abnormal; Notable for the following components:    Sed Rate 17 (*)     All other components within normal limits   CBC WITH DIFFERENTIAL - Abnormal; Notable for the following components:    WBC 4.4 (*)     Hgb 12.5 (*)     Hct 40.5 (*)     MCV 75.0 (*)     MCH 23.1 (*)     MCHC 30.9 (*)     All other components within normal limits   DRUG SCREEN, URINE (BEAKER) - Abnormal; Notable for the following components:    Fentanyl, Urine Positive (*)     Opiates, Urine Positive (*)     All other components within normal limits    Narrative:     Cut off concentrations:    Amphetamines - 1000 ng/ml  Barbiturates - 200 ng/ml  Benzodiazepine - 200 ng/ml  Cannabinoids (THC) - 50 ng/ml  Cocaine - 300 ng/ml  Fentanyl - 1.0 ng/ml  MDMA - 500 ng/ml  Opiates - 300 ng/ml   Phencyclidine (PCP) - 25 ng/ml    Specimen submitted for drug analysis and tested for pH and specific gravity in order to evaluate sample integrity. Suspect tampering if specific gravity is <1.003 and/or pH is not within the range of 4.5 - 8.0  False negatives may result form substances such as bleach added to urine.  False positives may result for the presence of a substance with similar chemical structure to the drug or its metabolite.    This test provides only a PRELIMINARY analytical test result. A more specific alternate chemical method must be used in order to obtain a confirmed analytical result. Gas chromatography/mass spectrometry (GC/MS) is the preferred confirmatory method. Other chemical confirmation methods are available. Clinical consideration and professional judgement should be applied to any drug of abuse test result, particularly when preliminary positive results are used.    Positive results will be  confirmed only at the physicians request. Unconfirmed screening results are to be used only for medical purposes (treatment).        COVID/FLU A&B PCR - Normal    Narrative:     The Xpert Xpress SARS-CoV-2/FLU/RSV plus is a rapid, multiplexed real-time PCR test intended for the simultaneous qualitative detection and differentiation of SARS-CoV-2, Influenza A, Influenza B, and respiratory syncytial virus (RSV) viral RNA in either nasopharyngeal swab or nasal swab specimens.         TROPONIN I - Normal   LACTIC ACID, PLASMA - Normal   MAGNESIUM - Normal   PROTIME-INR - Normal   MAGNESIUM - Normal   PHOSPHORUS - Normal   TSH - Normal   HIGH SENSITIVITY CRP - Normal   CBC W/ AUTO DIFFERENTIAL    Narrative:     The following orders were created for panel order CBC auto differential.  Procedure                               Abnormality         Status                     ---------                               -----------         ------                     CBC with Differential[[id number]]        Abnormal            Final result                 Please view results for these tests on the individual orders.   LIPID PANEL   CBC W/ AUTO DIFFERENTIAL    Narrative:     The following orders were created for panel order CBC auto differential.  Procedure                               Abnormality         Status                     ---------                               -----------         ------                     CBC with Differential[[id number]]        Abnormal            Final result               Manual Differential[[id number]]                              Final result                 Please view results for these tests on the individual orders.   MANUAL DIFFERENTIAL   HEMOGLOBIN A1C     EKG Readings: (Independently Interpreted)   Initial Reading: No STEMI. Rhythm: Normal Sinus Rhythm. Heart Rate: 77. Ectopy: No Ectopy. Conduction: Normal. ST Segments: Normal ST Segments. T Waves Flipped: II, III, AVF, I, AVL, V5 and V6. Axis:  Normal. Q Waves: I, II, III, AVL, AVF, V5 and V6.   2037   ECG Results              EKG 12-lead (Final result)  Result time 10/31/22 21:12:57      Final result by Interface, Lab [facility] (10/31/22 21:12:57)                   Narrative:    Test Reason : R55,    Vent. Rate : 077 BPM     Atrial Rate : 077 BPM     P-R Int : 190 ms          QRS Dur : 094 ms      QT Int : 368 ms       P-R-T Axes : 071 067 015 degrees     QTc Int : 416 ms    Normal sinus rhythm  Minimal voltage criteria for LVH, may be normal variant ( Sokolow-Craven )  Cannot rule out Inferior infarct ,age undetermined  T wave abnormality, consider lateral ischemia  Abnormal ECG  No previous ECGs available  Confirmed by Michel Ayers MD ([id number]) on 10/31/2022 9:12:45 PM    Referred By: [email or web address]   SELF           Confirmed By:Michel Ayers MD                                  Imaging Results              MRA Neck with contrast (Final result)  Result time 10/31/22 12:58:44      Final result by Martina Christiansen MD (10/31/22 12:58:44)                   Impression:      No large vessel occlusion or flow-limiting stenosis.      Electronically signed by: Martina Christiansen  Date:    10/31/2022  Time:    12:58               Narrative:    EXAMINATION:  MRA BRAIN WITHOUT CONTRAST; MRA NECK WITH CONTRAST    CLINICAL HISTORY:  Stroke/TIA, determine embolic source;    TECHNIQUE:  Three-dimensional time of flight brain MR angiography and contrast enhanced brain MR angiography of intracranial vessels is performed, and maximum intensity projection reformatted images are presented in multiple three-dimensional rotational projections.    Three-dimensional time of flight brain MR angiography of intracranial vessels is performed, and maximum intensity projection reformatted images are presented in multiple three-dimensional rotational projections.    COMPARISON:  MRI brain the same day    FINDINGS:  MRA cervical:    The origins of the great vessels are patent.    The common  carotid arteries, carotid bulbs and internal carotid arteries are patent without flow-limiting stenosis.    The vertebral arteries are patent.    MRA brain:    The internal carotid arteries are patent with mild multifocal narrowing.  The proximal middle cerebral arteries and anterior cerebral arteries are patent with motion artifact partially obscuring the right MCA bifurcation.    The vertebral arteries, basilar artery and posterior cerebral arteries are patent.                                       MRI BRAIN WITHOUT CONTRAST (Final result)  Result time 10/31/22 12:42:29      Final result by Martina Christiansen MD (10/31/22 12:42:29)                   Impression:      1. No acute intracranial abnormality.  2. Moderate chronic microvascular ischemic changes.      Electronically signed by: Martina Christiansen  Date:    10/31/2022  Time:    12:42               Narrative:    EXAMINATION:  MRI BRAIN WITHOUT CONTRAST    CLINICAL HISTORY:  Stroke, follow up;    TECHNIQUE:  Multiplanar, multisequence MR images of the brain were obtained without the administration of intravenous contrast.    COMPARISON:  CT head dated 10/30/2022    FINDINGS:  There is no restricted diffusion, hemorrhage or edema. There are scattered small areas of encephalomalacia in the right frontal lobe, bilateral basal ganglia, thalami and bilateral cerebellar hemispheres, with moderate patchy T2/FLAIR hyperintensities in the subcortical and periventricular white matter.    There is no mass effect or midline shift.  The basal cisterns are patent. There is parenchymal volume loss with ex vacuo dilatation of the right frontal horn. There is no abnormal extra-axial fluid collection.  The major intracranial flow voids are patent.  The paranasal sinuses and mastoid air cells are clear.                                       MRA Brain without contrast (Final result)  Result time 10/31/22 12:58:44   Procedure changed from MRA Brain with contrast     Final result  by Martina Christiansen MD (10/31/22 12:58:44)                   Impression:      No large vessel occlusion or flow-limiting stenosis.      Electronically signed by: Martina Christiansen  Date:    10/31/2022  Time:    12:58               Narrative:    EXAMINATION:  MRA BRAIN WITHOUT CONTRAST; MRA NECK WITH CONTRAST    CLINICAL HISTORY:  Stroke/TIA, determine embolic source;    TECHNIQUE:  Three-dimensional time of flight brain MR angiography and contrast enhanced brain MR angiography of intracranial vessels is performed, and maximum intensity projection reformatted images are presented in multiple three-dimensional rotational projections.    Three-dimensional time of flight brain MR angiography of intracranial vessels is performed, and maximum intensity projection reformatted images are presented in multiple three-dimensional rotational projections.    COMPARISON:  MRI brain the same day    FINDINGS:  MRA cervical:    The origins of the great vessels are patent.    The common carotid arteries, carotid bulbs and internal carotid arteries are patent without flow-limiting stenosis.    The vertebral arteries are patent.    MRA brain:    The internal carotid arteries are patent with mild multifocal narrowing.  The proximal middle cerebral arteries and anterior cerebral arteries are patent with motion artifact partially obscuring the right MCA bifurcation.    The vertebral arteries, basilar artery and posterior cerebral arteries are patent.                                       X-Ray Chest AP Portable (Final result)  Result time 10/31/22 07:19:34      Final result by Rakesh Mena MD (10/31/22 07:19:34)                   Impression:      No acute cardiopulmonary process.      Electronically signed by: Rakesh Mena  Date:    10/31/2022  Time:    07:19               Narrative:    EXAMINATION:  XR CHEST AP PORTABLE    CLINICAL HISTORY:  fever;    TECHNIQUE:  Single view of the  chest    COMPARISON:  07/11/2022    FINDINGS:  No focal opacification, pleural effusion, or pneumothorax.    The cardiomediastinal silhouette is within normal limits.    No acute osseous abnormality.                                       CT Head Without Contrast (Final result)  Result time 10/31/22 07:02:11      Final result by Uri Lugo MD (10/31/22 07:02:11)                   Impression:    Impression:    No acute intracranial process identified. Details and other findings as noted above.    No significant discrepancy with overnight report.      Electronically signed by: Uri Lugo  Date:    10/31/2022  Time:    07:02               Narrative:      Technique:CT of the head was performed without intravenous contrast with axial as well as coronal and sagittal images.    Dosage Information:Automated Exposure Control was utilized 1812.33 mGy.cm.    Clinical history:AMS, unknown cause, +LOC.    Findings:    Hemorrhage:No acute intracranial hemorrhage is seen.    CSF spaces:The ventricles, sulci and basal cisterns all appear mildly prominent suggesting an element of global cerebral atrophy.    Brain parenchyma:There is no acute large vessel territory infarct.  Pronounced microvascular change is seen in portions of the periventricular and deep white matter tracts. A hypodense focus is seen in the right basal ganglia extending to the right corona radiata (series 2 images 14 to 17). This may reflect a chronic infarct.    Cerebellum:Encephalomalacic changes are seen in the bilateral cerebellar hemispheres.    Vascular:Unremarkable venous sinuses. Mild to moderate atheromatous calcification of the intracranial arteries is seen.    Calvarium:No acute linear or depressed skull fracture is seen.    Maxillofacial Structures:    Paranasal sinuses:The visualized paranasal sinuses appear clear with no significant mucoperiosteal thickening or air fluid levels identified.    Orbits:The bilateral native lenses are absent.   There is some chronic appearing deformity of left lamina papyracea.                        Preliminary result by Uri Lugo MD (10/30/22 20:58:50)                   Narrative:    START OF REPORT:  Technique: CT of the head was performed without intravenous contrast with axial as well as coronal and sagittal images.    Comparison: None.    Dosage Information: Automated Exposure Control was utilized 1812.33 mGy.cm.    Clinical history: AMS, unknown cause, +LOC.    Findings:  Hemorrhage: No acute intracranial hemorrhage is seen.  CSF spaces: The ventricles, sulci and basal cisterns all appear mildly prominent suggesting an element of global cerebral atrophy.  Brain parenchyma: There is preservation of the grey white junction throughout. Moderate to pronounced microvascular change is seen in portions of the periventricular and deep white matter tracts. A hypodense focus is seen in the right basal ganglia extending to the right corona radiata (series 2 images 14 to 17). This may reflect a chronic infarct.  Cerebellum: Encephalomalacic changes are seen in the bilateral cerebellar hemispheres.  Vascular: Unremarkable venous sinuses. Mild to moderate atheromatous calcification of the intracranial arteries is seen.  Sella and skull base: The sella appears to be within normal limits for age.  Cerebellopontine angles: Within normal limits.  Herniation: None.  Intracranial calcifications: Incidental note is made of bilateral choroid plexus calcification. Incidental note is made of some pineal region calcification.  Calvarium: No acute linear or depressed skull fracture is seen.    Maxillofacial Structures:  Paranasal sinuses: The visualized paranasal sinuses appear clear with no significant mucoperiosteal thickening or air fluid levels identified.  Orbits: The bilateral native lenses are absent.  Zygomatic arches: The zygomatic arches are intact and unremarkable.  Temporal bones and mastoids: The temporal bones and  mastoids appear unremarkable.  TMJ: The mandibular condyles appear normally placed with respect to the mandibular fossa.    Visualized upper cervical spine: The visualized cervical spine appears unremarkable.      Impression:  1. No acute intracranial process identified. Details and other findings as noted above.                          Preliminary result by Pollo Rosario MD (10/30/22 20:58:50)                   Narrative:    START OF REPORT:  Technique: CT of the head was performed without intravenous contrast with axial as well as coronal and sagittal images.    Comparison: None.    Dosage Information: Automated Exposure Control was utilized 1812.33 mGy.cm.    Clinical history: AMS, unknown cause, +LOC.    Findings:  Hemorrhage: No acute intracranial hemorrhage is seen.  CSF spaces: The ventricles, sulci and basal cisterns all appear mildly prominent suggesting an element of global cerebral atrophy.  Brain parenchyma: There is preservation of the grey white junction throughout. Moderate to pronounced microvascular change is seen in portions of the periventricular and deep white matter tracts. A hypodense focus is seen in the right basal ganglia extending to the right corona radiata (series 2 images 14 to 17). This may reflect a chronic infarct.  Cerebellum: Encephalomalacic changes are seen in the bilateral cerebellar hemispheres.  Vascular: Unremarkable venous sinuses. Mild to moderate atheromatous calcification of the intracranial arteries is seen.  Sella and skull base: The sella appears to be within normal limits for age.  Cerebellopontine angles: Within normal limits.  Herniation: None.  Intracranial calcifications: Incidental note is made of bilateral choroid plexus calcification. Incidental note is made of some pineal region calcification.  Calvarium: No acute linear or depressed skull fracture is seen.    Maxillofacial Structures:  Paranasal sinuses: The visualized paranasal sinuses appear clear with no  significant mucoperiosteal thickening or air fluid levels identified.  Orbits: The bilateral native lenses are absent.  Zygomatic arches: The zygomatic arches are intact and unremarkable.  Temporal bones and mastoids: The temporal bones and mastoids appear unremarkable.  TMJ: The mandibular condyles appear normally placed with respect to the mandibular fossa.    Visualized upper cervical spine: The visualized cervical spine appears unremarkable.      Impression:  1. No acute intracranial process identified. Details and other findings as noted above.                                         Medications   levETIRAcetam injection 1,000 mg (1,000 mg Intravenous Given 10/30/22 2132)   lactated ringers bolus 1,000 mL (0 mLs Intravenous Stopped 10/31/22 0050)   aspirin EC tablet 325 mg (325 mg Oral Given 10/31/22 9143)   gadobenate dimeglumine (MULTIHANCE) injection 12 mL (12 mLs Intravenous Given 10/31/22 1249)     Medical Decision Making:   Clinical Tests:   Lab Tests: Ordered and Reviewed  Radiological Study: Ordered and Reviewed  Medical Tests: Ordered and Reviewed        Scribe Attestation:   Scribe #1: I performed the above scribed service and the documentation accurately describes the services I performed. I attest to the accuracy of the note.    Attending Attestation:           Physician Attestation for Scribe:  Physician Attestation Statement for Scribe #1: I, reviewed documentation, as scribed by Leo Marlow in my presence, and it is both accurate and complete.           ED Course as of 11/10/22 0357   Sun Oct 30, 2022   2212 Pt says he is doing well [CY]   2309 RN with pt currently [CY]   Mon Oct 31, 2022   0056 RN with pt currently [CY]   0201 Pt says he is feeling well and ready to go home [CY]   0308 Pt says feeling well [CY]      ED Course User Index  [CY] Leo Marlow                 Clinical Impression:   Final diagnoses:  [R55] Syncope        ED Disposition Condition    Admit                 Ryan ESTRADA  MD Dima  11/10/22 0357

## 2022-10-31 NOTE — CONSULTS
[facility]  Neurology  Consult Note    Patient Name: Ezequiel Merchant  MRN: [id number]  Admission Date: 10/30/2022  Hospital Length of Stay: 0 days  Code Status: Full Code   Attending Provider: Lawson Leong MD   Consulting Provider: Amarilis Duggan [facility]  Primary Care Physician: Jamar Lenz DO  Principal Problem:<principal problem not specified>    Inpatient consult to Vascular (Stroke) Neurology  Consult performed by: MARINA Wilson  Consult ordered by: Kathy Fuller [facility]         Subjective:     Chief Complaint:       HPI:   81 y/o M PMH HTN, HLD, CAD, PVD, lung CA, and prostate CA who presented to ED on 10/30 following syncopal episode witnessed by pt's wife. pt's wife reported pt unresponsive while lying in bed watching TV, pulled his LUE up with a closed fist that she couldn't push down, and lasted for a few seconds. following episode, post ictal state described as pt confused and started mumbling incoherent words with a perioral droop. pt's wife denies urinary incontinence or tongue biting. pt's wife reports pt recently seen in ED approx 1 month ago for similar episode, however was discharged w/o known reason for episode and was not started on any AEDs. Of note, pt currently undergoing radiation for his throat CA.      CT head w/o revealed hypodense focus to R basal ganglia extending to corona radiata, encephalomalacia to b/l cerebellar hemispheres, and negative for acute intracranial abnormalities. MRI brain w/o negative for acute infarct or any other acute intracranial abnormalities. MRA head/neck unrevealing for LVO, flow limiting stenosis, or malformations. pt's labs significant for H/H 12.5/40.5, MVC 75, and UA positive for protein, WBC, and leukocytes.     pt given full dose ASA and loaded with 1 gm keppra in ED.         Past Medical History:   Diagnosis Date    CAD in native artery     Dementia     DJD (degenerative joint disease)     GERD  (gastroesophageal reflux disease)     Hallux valgus (acquired), unspecified foot     Hammer toe, acquired     Hx of cancer of lung     Hypercholesteremia     Hypertension     Lung cancer     Onychomycosis of toenail     PAD (peripheral artery disease)     PVD (peripheral vascular disease)     Tinea pedis     Tobacco user     Unspecified dementia without behavioral disturbance        Past Surgical History:   Procedure Laterality Date    BACK SURGERY      CORONARY STENT PLACEMENT      DEBRIDEMENT OF FOOT Left     ESOPHAGOGASTRODUODENOSCOPY N/A 08/02/2022    Procedure: EGD w/ dil;  Surgeon: Pollo Hairston MD;  Location: [facility] ENDOSCOPY;  Service: Gastroenterology;  Laterality: N/A;    gastrointestinal biopsy      TOE AMPUTATION Left        Review of patient's allergies indicates:  No Known Allergies    Current Neurological Medications:     No current facility-administered medications on file prior to encounter.     Current Outpatient Medications on File Prior to Encounter   Medication Sig    albuterol (PROVENTIL/VENTOLIN HFA) 90 mcg/actuation inhaler Inhale 2 puffs into the lungs every 6 (six) hours as needed for Wheezing. Rescue    amLODIPine (NORVASC) 5 MG tablet Take 5 mg by mouth once daily.    atorvastatin (LIPITOR) 20 MG tablet Take 20 mg by mouth once daily.    carvediloL (COREG) 3.125 MG tablet Take 3.125 mg by mouth 2 (two) times daily with meals.    clopidogreL (PLAVIX) 75 mg tablet Take 75 mg by mouth once daily.    donepeziL (ARICEPT) 10 MG tablet Take 10 mg by mouth every evening.    omeprazole (PRILOSEC) 20 MG capsule Take by mouth once daily.    sacubitriL-valsartan (ENTRESTO) 49-51 mg per tablet Take 1 tablet by mouth 2 (two) times daily.    fluconazole (DIFLUCAN) 100 MG tablet Take 100 mg by mouth once daily.    fluticasone propionate (FLOVENT HFA) 220 mcg/actuation inhaler Inhale into the lungs 2 (two) times daily. Controller     Family History       Problem Relation  (Age of Onset)    Diabetes Father    Stroke Mother          Tobacco Use    Smoking status: Every Day     Packs/day: 1.00     Types: Cigarettes    Smokeless tobacco: Never   Substance and Sexual Activity    Alcohol use: Never    Drug use: Never    Sexual activity: Not on file     Review of Systems  Defer to MD   Objective:     Vital Signs (Most Recent):  Temp: 98.1 °F (36.7 °C) (10/30/22 2039)  Pulse: 63 (10/31/22 0700)  Resp: 17 (10/31/22 0700)  BP: (!) 145/80 (10/31/22 0700)  SpO2: 95 % (10/31/22 0700)   Vital Signs (24h Range):  Temp:  [98.1 °F (36.7 °C)] 98.1 °F (36.7 °C)  Pulse:  [61-81] 63  Resp:  [16-20] 17  SpO2:  [95 %-100 %] 95 %  BP: (103-146)/(69-89) 145/80     Weight: 58.1 kg (128 lb)  Body mass index is 21.33 kg/m².    Physical Exam  Full neurologic exam to follow per MD       Significant Labs:   Recent Lab Results  (Last 5 results in the past 24 hours)        10/31/22  0825   10/31/22  0651   10/31/22  0056   10/30/22  2113   10/30/22  2044        Influenza A, Molecular         Not Detected       Influenza B, Molecular         Not Detected       Phencyclidine Negative               Albumin/Globulin Ratio   1.3     1.2         Albumin   2.9     3.7         Alkaline Phosphatase   61     80         ALT   13     16         Amphetamine Screen, Ur Negative               Appearance, UA     Clear           aPTT       36.0  Comment: For Minimal Heparin Infusion, the goal aPTT 64-85 seconds corresponds to an anti-Xa of 0.3-0.5.    For Low Intensity and High Intensity Heparin, the goal aPTT  seconds corresponds to an anti-Xa of 0.3-0.7         AST   15     18         Bacteria, UA     None Seen           Barbiturate Screen, Ur Negative               Baso #       0.01         Basophil %       0.2         Benzodiazepine Screen, Urine Negative               BILIRUBIN TOTAL   0.8     0.7         Bilirubin, UA     Negative           BUN   13.4     14.8         Calcium   8.4     9.3         Cannabinoids,  Urine Negative               Chloride   108     108         Cholesterol   109             CO2   25     23         Cocaine (Metab.) Negative               Color, UA     Dark Yellow           Creatinine   1.24     1.47         CRP, High Sensitivity   1.65             eGFR   58     48         Eos #       0.05         Eosinophil %       1.0         Estimated Avg Glucose   114.0             Fentanyl, Urine Positive               Globulin, Total   2.3     3.1         Glucose   89     104         Glucose, UA     Negative           Gran # (ANC)   3.124             HDL   43             Hematocrit   40.5     48.7         Hemoglobin   12.5     14.8         Hemoglobin A1C External   5.6             Immature Grans (Abs)   0.01     0.02         Immature Granulocytes   0.2     0.4         INR       1.00         Instr WBC   4.4             Ketones, UA     1+           Lactate, Gary       1.7         LDL Cholesterol External   52.00             Leukocytes, UA     1+           Lymph #   0.616     0.87         LYMPH %       17.7         Lymph Man   14             Magnesium   1.80     2.10         MCH   23.1     23.1         MCHC   30.9     30.4         MCV   75.0     75.9         MDMA, Urine Negative               Mono #   0.66     0.63         Mono %   15     12.8         MPV   9.6     9.3         Neut #       3.3         Neut %       67.9         Neutrophils Relative   71             NITRITE UA     Negative           nRBC   0.0     0.0         NRBC Man   1             Occult Blood UA     2+           Opiate Scrn, Ur Positive               pH, UA     5.5           pH, Urine 6.5               Phosphorus   2.5             Platelet Estimate   Normal             Platelets   180     219         Potassium   4.0     5.0         PROTEIN TOTAL   5.2     6.8         Protein, UA     1+           Protime       13.1         RBC   5.40     6.42         RBC, UA     33           RDW   15.1     16.3         SARS-CoV2 (COVID-19) Qualitative PCR          Not Detected       Sed Rate   17             Sodium   139     141         Specific Gravity,UA     1.030           Specific Gravity, Urine Auto 1.015               Squam Epithel, UA     6           Thyroid Stimulating Hormone   1.5812             Total Cholesterol/HDL Ratio   3             Triglycerides   70             Troponin I       0.012         Urobilinogen, UA     1.0           Very Low Density Lipoprotein   14             WBC, UA     50           WBC   4.4     4.9                                Significant Imaging:   CT head w/o 10/30/2022:  Findings:     Hemorrhage:No acute intracranial hemorrhage is seen.     CSF spaces:The ventricles, sulci and basal cisterns all appear mildly prominent suggesting an element of global cerebral atrophy.     Brain parenchyma:There is no acute large vessel territory infarct.  Pronounced microvascular change is seen in portions of the periventricular and deep white matter tracts. A hypodense focus is seen in the right basal ganglia extending to the right corona radiata (series 2 images 14 to 17). This may reflect a chronic infarct.     Cerebellum:Encephalomalacic changes are seen in the bilateral cerebellar hemispheres.     Vascular:Unremarkable venous sinuses. Mild to moderate atheromatous calcification of the intracranial arteries is seen.     Calvarium:No acute linear or depressed skull fracture is seen.     Maxillofacial Structures:     Paranasal sinuses:The visualized paranasal sinuses appear clear with no significant mucoperiosteal thickening or air fluid levels identified.     Orbits:The bilateral native lenses are absent.  There is some chronic appearing deformity of left lamina papyracea.     Impression:  Impression:     No acute intracranial process identified. Details and other findings as noted above.    MRI brain w/o 10/31/2022:  FINDINGS:  There is no restricted diffusion, hemorrhage or edema. There are scattered small areas of encephalomalacia in the right  frontal lobe, bilateral basal ganglia, thalami and bilateral cerebellar hemispheres, with moderate patchy T2/FLAIR hyperintensities in the subcortical and periventricular white matter.     There is no mass effect or midline shift.  The basal cisterns are patent. There is parenchymal volume loss with ex vacuo dilatation of the right frontal horn. There is no abnormal extra-axial fluid collection.  The major intracranial flow voids are patent.  The paranasal sinuses and mastoid air cells are clear.     Impression:     1. No acute intracranial abnormality.  2. Moderate chronic microvascular ischemic changes.     MRA head/neck 10/31/2022:  FINDINGS:  MRA cervical:     The origins of the great vessels are patent.     The common carotid arteries, carotid bulbs and internal carotid arteries are patent without flow-limiting stenosis.     The vertebral arteries are patent.     MRA brain:     The internal carotid arteries are patent with mild multifocal narrowing.  The proximal middle cerebral arteries and anterior cerebral arteries are patent with motion artifact partially obscuring the right MCA bifurcation.     The vertebral arteries, basilar artery and posterior cerebral arteries are patent.     Impression:     No large vessel occlusion or flow-limiting stenosis.    I have reviewed all pertinent imaging results/findings within the past 24 hours.    Assessment and Plan:     Syncope  Syncope vs seizure    -await EEG results  -await TTE results   -will initiate AED pending EEG results  -seizure/fall precautions          VTE Risk Mitigation (From admission, onward)         Ordered     enoxaparin injection 40 mg  Daily         10/31/22 0611     IP VTE HIGH RISK PATIENT  Once         10/31/22 0611     Place sequential compression device  Until discontinued         10/31/22 0611                Thank you for your consult. Further recommendations to follow per MD Amarilis Duggan, [facility]  Inpatient Neurology  [facility]  General - Emergency Dept

## 2022-10-31 NOTE — FIRST PROVIDER EVALUATION
"Medical screening examination initiated.  I have conducted a focused provider triage encounter, findings are as follows:    Chief Complaint   Patient presents with    Loss of Consciousness     Patient had brief syncopal episode this evening, lasting a couple of minutes. Currently being treated with radiation for throat cancer.       Brief history of present illness:  82 y.o. male presents to the ED with syncopal episode this evening per wife. States he is currently being treated with radiation for throat cancer. On blood thinners.     Vitals:    10/30/22 2039   BP: 103/69   BP Location: Left arm   Patient Position: Sitting   Pulse: 74   Resp: 18   Temp: 98.1 °F (36.7 °C)   TempSrc: Oral   SpO2: 100%   Weight: 58.1 kg (128 lb)   Height: 5' 4.96" (1.65 m)       Pertinent physical exam:  Awake, alert, ambulatory, non-labored respirations    Brief workup plan:  labs, EKG, CXR, swab, UA    Preliminary workup initiated; this workup will be continued and followed by the physician or advanced practice provider that is assigned to the patient when roomed.  "

## 2022-10-31 NOTE — SUBJECTIVE & OBJECTIVE
Past Medical History:   Diagnosis Date    CAD in native artery     Dementia     DJD (degenerative joint disease)     GERD (gastroesophageal reflux disease)     Hallux valgus (acquired), unspecified foot     Hammer toe, acquired     Hx of cancer of lung     Hypercholesteremia     Hypertension     Lung cancer     Onychomycosis of toenail     PAD (peripheral artery disease)     PVD (peripheral vascular disease)     Tinea pedis     Tobacco user     Unspecified dementia without behavioral disturbance        Past Surgical History:   Procedure Laterality Date    BACK SURGERY      CORONARY STENT PLACEMENT      DEBRIDEMENT OF FOOT Left     ESOPHAGOGASTRODUODENOSCOPY N/A 08/02/2022    Procedure: EGD w/ dil;  Surgeon: Pollo Hairston MD;  Location: [facility] ENDOSCOPY;  Service: Gastroenterology;  Laterality: N/A;    gastrointestinal biopsy      TOE AMPUTATION Left        Review of patient's allergies indicates:  No Known Allergies    Current Neurological Medications:     No current facility-administered medications on file prior to encounter.     Current Outpatient Medications on File Prior to Encounter   Medication Sig    albuterol (PROVENTIL/VENTOLIN HFA) 90 mcg/actuation inhaler Inhale 2 puffs into the lungs every 6 (six) hours as needed for Wheezing. Rescue    amLODIPine (NORVASC) 5 MG tablet Take 5 mg by mouth once daily.    atorvastatin (LIPITOR) 20 MG tablet Take 20 mg by mouth once daily.    carvediloL (COREG) 3.125 MG tablet Take 3.125 mg by mouth 2 (two) times daily with meals.    clopidogreL (PLAVIX) 75 mg tablet Take 75 mg by mouth once daily.    donepeziL (ARICEPT) 10 MG tablet Take 10 mg by mouth every evening.    omeprazole (PRILOSEC) 20 MG capsule Take by mouth once daily.    sacubitriL-valsartan (ENTRESTO) 49-51 mg per tablet Take 1 tablet by mouth 2 (two) times daily.    fluconazole (DIFLUCAN) 100 MG tablet Take 100 mg by mouth once daily.    fluticasone propionate (FLOVENT HFA) 220 mcg/actuation inhaler  Inhale into the lungs 2 (two) times daily. Controller     Family History       Problem Relation (Age of Onset)    Diabetes Father    Stroke Mother          Tobacco Use    Smoking status: Every Day     Packs/day: 1.00     Types: Cigarettes    Smokeless tobacco: Never   Substance and Sexual Activity    Alcohol use: Never    Drug use: Never    Sexual activity: Not on file     Review of Systems  Defer to MD   Objective:     Vital Signs (Most Recent):  Temp: 98.1 °F (36.7 °C) (10/30/22 2039)  Pulse: 63 (10/31/22 0700)  Resp: 17 (10/31/22 0700)  BP: (!) 145/80 (10/31/22 0700)  SpO2: 95 % (10/31/22 0700)   Vital Signs (24h Range):  Temp:  [98.1 °F (36.7 °C)] 98.1 °F (36.7 °C)  Pulse:  [61-81] 63  Resp:  [16-20] 17  SpO2:  [95 %-100 %] 95 %  BP: (103-146)/(69-89) 145/80     Weight: 58.1 kg (128 lb)  Body mass index is 21.33 kg/m².    Physical Exam  Full neurologic exam to follow per MD       Significant Labs:   Recent Lab Results  (Last 5 results in the past 24 hours)        10/31/22  0825   10/31/22  0651   10/31/22  0056   10/30/22  2113   10/30/22  2044        Influenza A, Molecular         Not Detected       Influenza B, Molecular         Not Detected       Phencyclidine Negative               Albumin/Globulin Ratio   1.3     1.2         Albumin   2.9     3.7         Alkaline Phosphatase   61     80         ALT   13     16         Amphetamine Screen, Ur Negative               Appearance, UA     Clear           aPTT       36.0  Comment: For Minimal Heparin Infusion, the goal aPTT 64-85 seconds corresponds to an anti-Xa of 0.3-0.5.    For Low Intensity and High Intensity Heparin, the goal aPTT  seconds corresponds to an anti-Xa of 0.3-0.7         AST   15     18         Bacteria, UA     None Seen           Barbiturate Screen, Ur Negative               Baso #       0.01         Basophil %       0.2         Benzodiazepine Screen, Urine Negative               BILIRUBIN TOTAL   0.8     0.7         Bilirubin, UA      Negative           BUN   13.4     14.8         Calcium   8.4     9.3         Cannabinoids, Urine Negative               Chloride   108     108         Cholesterol   109             CO2   25     23         Cocaine (Metab.) Negative               Color, UA     Dark Yellow           Creatinine   1.24     1.47         CRP, High Sensitivity   1.65             eGFR   58     48         Eos #       0.05         Eosinophil %       1.0         Estimated Avg Glucose   114.0             Fentanyl, Urine Positive               Globulin, Total   2.3     3.1         Glucose   89     104         Glucose, UA     Negative           Gran # (ANC)   3.124             HDL   43             Hematocrit   40.5     48.7         Hemoglobin   12.5     14.8         Hemoglobin A1C External   5.6             Immature Grans (Abs)   0.01     0.02         Immature Granulocytes   0.2     0.4         INR       1.00         Instr WBC   4.4             Ketones, UA     1+           Lactate, Gary       1.7         LDL Cholesterol External   52.00             Leukocytes, UA     1+           Lymph #   0.616     0.87         LYMPH %       17.7         Lymph Man   14             Magnesium   1.80     2.10         MCH   23.1     23.1         MCHC   30.9     30.4         MCV   75.0     75.9         MDMA, Urine Negative               Mono #   0.66     0.63         Mono %   15     12.8         MPV   9.6     9.3         Neut #       3.3         Neut %       67.9         Neutrophils Relative   71             NITRITE UA     Negative           nRBC   0.0     0.0         NRBC Man   1             Occult Blood UA     2+           Opiate Scrn, Ur Positive               pH, UA     5.5           pH, Urine 6.5               Phosphorus   2.5             Platelet Estimate   Normal             Platelets   180     219         Potassium   4.0     5.0         PROTEIN TOTAL   5.2     6.8         Protein, UA     1+           Protime       13.1         RBC   5.40     6.42         RBC,  UA     33           RDW   15.1     16.3         SARS-CoV2 (COVID-19) Qualitative PCR         Not Detected       Sed Rate   17             Sodium   139     141         Specific Gravity,UA     1.030           Specific Gravity, Urine Auto 1.015               Squam Epithel, UA     6           Thyroid Stimulating Hormone   1.5812             Total Cholesterol/HDL Ratio   3             Triglycerides   70             Troponin I       0.012         Urobilinogen, UA     1.0           Very Low Density Lipoprotein   14             WBC, UA     50           WBC   4.4     4.9                                Significant Imaging:   CT head w/o 10/30/2022:  Findings:     Hemorrhage:No acute intracranial hemorrhage is seen.     CSF spaces:The ventricles, sulci and basal cisterns all appear mildly prominent suggesting an element of global cerebral atrophy.     Brain parenchyma:There is no acute large vessel territory infarct.  Pronounced microvascular change is seen in portions of the periventricular and deep white matter tracts. A hypodense focus is seen in the right basal ganglia extending to the right corona radiata (series 2 images 14 to 17). This may reflect a chronic infarct.     Cerebellum:Encephalomalacic changes are seen in the bilateral cerebellar hemispheres.     Vascular:Unremarkable venous sinuses. Mild to moderate atheromatous calcification of the intracranial arteries is seen.     Calvarium:No acute linear or depressed skull fracture is seen.     Maxillofacial Structures:     Paranasal sinuses:The visualized paranasal sinuses appear clear with no significant mucoperiosteal thickening or air fluid levels identified.     Orbits:The bilateral native lenses are absent.  There is some chronic appearing deformity of left lamina papyracea.     Impression:  Impression:     No acute intracranial process identified. Details and other findings as noted above.    MRI brain w/o 10/31/2022:  FINDINGS:  There is no restricted  diffusion, hemorrhage or edema. There are scattered small areas of encephalomalacia in the right frontal lobe, bilateral basal ganglia, thalami and bilateral cerebellar hemispheres, with moderate patchy T2/FLAIR hyperintensities in the subcortical and periventricular white matter.     There is no mass effect or midline shift.  The basal cisterns are patent. There is parenchymal volume loss with ex vacuo dilatation of the right frontal horn. There is no abnormal extra-axial fluid collection.  The major intracranial flow voids are patent.  The paranasal sinuses and mastoid air cells are clear.     Impression:     1. No acute intracranial abnormality.  2. Moderate chronic microvascular ischemic changes.     MRA head/neck 10/31/2022:  FINDINGS:  MRA cervical:     The origins of the great vessels are patent.     The common carotid arteries, carotid bulbs and internal carotid arteries are patent without flow-limiting stenosis.     The vertebral arteries are patent.     MRA brain:     The internal carotid arteries are patent with mild multifocal narrowing.  The proximal middle cerebral arteries and anterior cerebral arteries are patent with motion artifact partially obscuring the right MCA bifurcation.     The vertebral arteries, basilar artery and posterior cerebral arteries are patent.     Impression:     No large vessel occlusion or flow-limiting stenosis.    I have reviewed all pertinent imaging results/findings within the past 24 hours.

## 2022-11-01 PROBLEM — R56.9 WITNESSED SEIZURE-LIKE ACTIVITY: Status: ACTIVE | Noted: 2022-01-01

## 2022-11-01 NOTE — PROCEDURES
EEG    Date/Time: 10/30/2022 8:44 PM  Performed by: Rosanne Shane MD  Authorized by: Kathy Fuller, AGACNP-BC       Technical description:  A standard digital EEG was performed at [facility].  The 10 20 international system of electrode placement is used.  Standard montages were recorded.  Activation procedures were performed.    Description:  The record was dominated by a 9 hertz posterior dominant rhythm which attenuated with eye opening activity.  During drowsiness, identified by ocular signs and alpha attenuation, there is diffuse asynchronous theta activity.  Stage 2 sleep was not identified.  Photic stimulation elicited no abnormalities.  There were no epileptiform discharges or electrographic seizures    Impression:  This is a normal awake and drowsy EEG.
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
## 2022-11-01 NOTE — PLAN OF CARE
Problem: Occupational Therapy  Goal: Occupational Therapy Goal  Description: Goals to be met by: 12/6/22     Patient will increase functional independence with ADLs by performing:    UE Dressing with Modified [place].  LE Dressing with Modified [place].  Grooming while standing with Modified [place].  Toileting from toilet with Modified [place] for hygiene and clothing management.   Toilet transfer to toilet with Modified [place].    Outcome: Ongoing, Progressing

## 2022-11-01 NOTE — SUBJECTIVE & OBJECTIVE
Subjective:     Interval History:   Pt's friend at bedside, who witnessed seizure-like episode, reports pt watching tv when he suddenly lost consciousness. During episode he extended his LUE out with hand in a tight fist, followed by clonic jerking, she could not bring his LUE down or arouse pt during episode w associated blank stare. Reports that she called for her daughter for assistance, who was in another room, her daughter was also not able to arouse him. Episode lasted approx 1-2 min, followed by period of confusion when pt gained consciousness. Denies urinary incontinence and no tongue biting d/t no teeth. Reports previous similar episode approx 1 week ago and 2 other episodes prior to that (total 3 witnessed episodes). Reports h/o etoh abuse x5 years ago, but only drinks etoh on weekends recently, and has quit etoh altogether x2 months at this time. Denies medication changes or illicit drug use. Reports he lives with friend and does not drive.     Current Neurological Medications:     Current Facility-Administered Medications   Medication Dose Route Frequency Provider Last Rate Last Admin    0.9%  NaCl infusion   Intravenous Continuous SANDRA Wise 75 mL/hr at 10/31/22 2111 New Bag at 10/31/22 2111    acetaminophen tablet 650 mg  650 mg Oral Q6H PRN SANDRA Wise        albuterol inhaler 2 puff  2 puff Inhalation Q6H PRN Lawson Leong MD        amLODIPine tablet 5 mg  5 mg Oral Daily Lawson Leong MD   5 mg at 11/01/22 0954    atorvastatin tablet 20 mg  20 mg Oral Daily Lawson Leong MD   20 mg at 11/01/22 0954    carvediloL tablet 3.125 mg  3.125 mg Oral BID WM Lawson Leong MD   3.125 mg at 11/01/22 0754    clopidogreL tablet 75 mg  75 mg Oral Daily Lawson Leong MD   75 mg at 11/01/22 0954    donepeziL tablet 10 mg  10 mg Oral QHS Lawson Leong MD   10 mg at 10/31/22 2111    enoxaparin injection 40 mg  40 mg Subcutaneous Daily DALIA WiseBC   40 mg at 10/31/22 5388     fluconazole tablet 100 mg  100 mg Oral Daily Lawson Leong MD   100 mg at 11/01/22 0954    fluticasone furoate 200 mcg/actuation inhaler 1 puff  1 puff Inhalation Daily Lawson Leong MD        labetaloL injection 10 mg  10 mg Intravenous Q2H PRN SANDRA Wise        ondansetron injection 4 mg  4 mg Intravenous Q4H PRN SANDRA Wise        pantoprazole EC tablet 40 mg  40 mg Oral Daily Lawson Leong MD   40 mg at 11/01/22 0954    sacubitriL-valsartan 49-51 mg per tablet 1 tablet  1 tablet Oral BID Lawson Leong MD   1 tablet at 11/01/22 0954       Review of Systems  A 14pt ros was reviewed & is negative unless o/w documented in the hpi    Objective:     Vital Signs (Most Recent):  Temp: 98.4 °F (36.9 °C) (11/01/22 1150)  Pulse: 64 (11/01/22 1150)  Resp: 20 (11/01/22 0744)  BP: 102/64 (11/01/22 1150)  SpO2: 96 % (11/01/22 1150) Vital Signs (24h Range):  Temp:  [97.5 °F (36.4 °C)-98.6 °F (37 °C)] 98.4 °F (36.9 °C)  Pulse:  [61-71] 64  Resp:  [18-20] 20  SpO2:  [96 %-100 %] 96 %  BP: (102-168)/(64-90) 102/64     Weight: 58.1 kg (128 lb)  Body mass index is 21.3 kg/m².    Physical Exam  Vitals reviewed.   Constitutional:       General: He is awake.      Appearance: Normal appearance.   HENT:      Head: Normocephalic and atraumatic.      Nose: Nose normal.      Mouth/Throat:      Mouth: Mucous membranes are moist.      Pharynx: Oropharynx is clear.   Eyes:      Extraocular Movements: Extraocular movements intact and EOM normal.      Pupils: Pupils are equal, round, and reactive to light.   Pulmonary:      Effort: Pulmonary effort is normal.   Skin:     General: Skin is warm and dry.   Neurological:      General: No focal deficit present.      Mental Status: He is alert and oriented to person, place, and time.   Psychiatric:         Speech: Speech normal.         Behavior: Behavior is cooperative.       NEUROLOGICAL EXAMINATION:     MENTAL STATUS   Oriented to person, place, and time.   Follows 3 step  commands.   Attention: normal. Concentration: normal.   Speech: speech is normal   Level of consciousness: alert  Knowledge: good.   Normal comprehension. Praxis: normal     CRANIAL NERVES     CN II   Visual fields full to confrontation.     CN III, IV, VI   Pupils are equal, round, and reactive to light.  Extraocular motions are normal.   Nystagmus: none   Conjugate gaze: present    CN V   Facial sensation intact.     CN VII   Facial expression full, symmetric.     CN XI   CN XI normal.     CN XII   CN XII normal.     MOTOR EXAM   Muscle bulk: normal  Overall muscle tone: normal    Strength   Right deltoid: 5/5  Left deltoid: 5/5  Right biceps: 5/5  Left biceps: 5/5  Right triceps: 5/5  Left triceps: 5/5  Right quadriceps: 5/5  Left quadriceps: 5/5  Right hamstrin/5  Left hamstrin/5  Right glutei: 5/5  Left glutei: 5/5  Right anterior tibial: 5/5  Right posterior tibial: 5/5  Left posterior tibial: 5/5    REFLEXES     Reflexes   Right ankle clonus: absent  Left ankle clonus: absent    SENSORY EXAM   Light touch normal.     Significant Labs:   Recent Lab Results         22  0319        Anion Gap 10.0       BUN 11.9       BUN/CREAT RATIO 10       Calcium 8.7       Chloride 107       CO2 22       Creatinine 1.14       eGFR >60       Glucose 79       Hematocrit 42.8       Hemoglobin 13.2       MCH 23.1       MCHC 30.8       MCV 74.8       MPV 10.2       nRBC 0.0       Platelets 204       Potassium 4.0       RBC 5.72       RDW 15.1       Sodium 139       WBC 4.2               Significant Imaging: I have reviewed all pertinent imaging results/findings within the past 24 hours.

## 2022-11-01 NOTE — PROGRESS NOTES
Inpatient Nutrition Assessment    Admit Date: 10/30/2022   Total duration of encounter: 2 days     Nutrition Recommendation/Prescription     Continue heart healthy diet as tolerated.    Add chocolate or strawberry Boost Plus TID. Provides 360 kcals and 14 g protein per serving     Communication of Recommendations: reviewed with patient/caregiver    Nutrition Assessment     Malnutrition Assessment/Nutrition-Focused Physical Exam    Malnutrition in the context of acute illness or injury  Degree of Malnutrition: non-severe (moderate) malnutrition  Energy Intake: < 75% of estimated energy requirement for > 7 days  Interpretation of Weight Loss: 5% In 1 month  Body Fat:mild depletion  Area of Body Fat Loss: orbital region  and upper arm region - triceps / biceps  Muscle Mass Loss: mild depletion  Area of Muscle Mass Loss: temple region - temporalis muscle and clavicle bone region - pectoralis major, deltoid, trapezius muscles  Fluid Accumulation: unable to obtain  Edema: unable to obtain   Reduced  Strength: unable to obtain  A minimum of two characteristics is recommended for diagnosis of either severe or non-severe malnutrition.    Chart Review    Reason Seen: malnutrition screening tool    Diagnosis:  Syncope versus CVA versus seizure  Acute kidney injury- improving   Essential hypertension   Hyperlipidemia  Coronary artery disease / PAD with stent LLE - on plavix   Known Hx Prostate cancer 10 yrs ago, Lung cancer S/P radiation and now with Throat cancer S/P radiation (9 out of 30 XRT given unable to tolerate the aside effects)  Current Tobacco use and alcohol use     Relevant Medical History:   hypertension, hyperlipidemia, coronary artery disease, peripheral vascular disease with stent in the left lower extremity on Plavix, history of prostate cancer 10 years ago when lung cancer post radiation and now with throat cancer status post radiation     Nutrition-Related Medications: Atorvastatin, Protonix  Calorie  "Containing IV Medications: no significant kcals from medications at this time    Nutrition-Related Labs:  11/1: WBC 4.2, Glu 79, Ca 8.7    Diet/PN Order: Diet heart healthy  Oral Supplement Order: none  Tube Feeding Order: none  Appetite/Oral Intake: good/% of meals  Factors Affecting Nutritional Intake: none identified  Food/Hoahaoism/Cultural Preferences: none reported  Food Allergies: none reported       Wound(s):   n/a    Comments    11/1/22: Pt & family report good appetite, patient was finishing 100% of his lunch during our visit, no GI complaints, chewing/swallowing well. His appetite had been decreased over the past month, lost about 7 lbs unintentionally. PO intake encouraged, agrees to chocolate or strawberry Boost.     Anthropometrics    Height: 5' 5" (165.1 cm) Height Method: Stated  Last Weight: 58.1 kg (128 lb) (10/31/22 1630) Weight Method: Bed Scale  BMI (Calculated): 21.3  BMI Classification: normal (BMI 18.5-24.9)        Ideal Body Weight (IBW), Male: 136 lb     % Ideal Body Weight, Male (lb): 94.12 %                          Usual Weight Provided By: patient and family/caregiver ~135 lbs 1 month ago     Wt Readings from Last 5 Encounters:   10/31/22 58.1 kg (128 lb)   08/02/22 67.6 kg (149 lb)   07/11/22 65 kg (143 lb 4.8 oz)   03/23/21 60.4 kg (133 lb 2.5 oz)     Weight Change(s) Since Admission:  Admit Weight: 58.1 kg (128 lb) (10/30/22 2039)      Estimated Needs        1750 kcals (30 kcals/kg CBW)   87 g (1.5 g/kg CBW)    1750 ml (1 ml/kcal)        Temp: 98.4 °F (36.9 °C)       Enteral Nutrition    Patient not receiving enteral nutrition at this time.    Parenteral Nutrition    Patient not receiving parenteral nutrition support at this time.    Evaluation of Received Nutrient Intake    Calories: meeting estimated needs  Protein: meeting estimated needs    Patient Education    Not applicable.    Nutrition Diagnosis     PES: Malnutrition related to energy intake < energy demand as evidenced " by unintentional weight loss, decreased po intake, muscle and fat losses. (new)    Interventions/Goals     Intervention(s): general/healthful diet, commercial beverage, and collaboration with other providers  Goal: Meet greater than 75% of nutritional needs by follow-up. (new)    Monitoring & Evaluation     Dietitian will monitor food and beverage intake, weight, and electrolyte/renal panel.  Nutrition Risk/Follow-Up: moderate (follow-up in 3-5 days)   Please consult if re-assessment needed sooner.

## 2022-11-01 NOTE — PT/OT/SLP PROGRESS
SLP attempting speech, language cognitive evaluation however pt receiving ADLs. SLP to reattempt tomorrow as appropriate.

## 2022-11-01 NOTE — PLAN OF CARE
Problem: Physical Therapy  Goal: Physical Therapy Goal  Description: Goals to be met by: 22     Patient will increase functional independence with mobility by performin. Supine to sit with Modified [place]  2. Sit to stand transfer with Modified [place]  3. Gait  x 200 feet with Modified [place] using rollator vs SBQC.     Outcome: Ongoing, Progressing

## 2022-11-01 NOTE — PROGRESS NOTES
[facility]  Hospital Medicine Progress Note        Chief Complaint: Inpatient Follow-up for seizures vs stroke    HPI: 82-year-old male with known past medical history of hypertension, hyperlipidemia, coronary artery disease, peripheral vascular disease with stent in the left lower extremity on Plavix, history of prostate cancer 10 years ago when lung cancer post radiation and now with throat cancer status post radiation which the friend at bedside reports he could only take 9 out of 30 sessions asit made him very sick and unable to tolerate admitted 10/31 after he had 2 episodes of passing out at home.  Both those episodes were witnessed by his friend and her daughter,  and reported that during those episodes, she noted some shaking of the right upper extremity.  When the patient came out of those episode, he was confused and his speech was incoherent.  Patient does not recall these episodes.  The friend at bedside is unable to inform how long the episode lasted.  Patient is currently denying any chest pain, shortness of breath, abdominal pain, nausea, vomiting, headache, dizziness or visual changes.  He denies weakness.  Reported he eats a regular diet, he ambulates and performs his ADLs at home without difficulty independently.  Discussed with patient family that we will order MRI of the brain, consult Neurology, verbalized understanding     Interval Hx:   Laying in bed, reported feeling good.  His friend is at bedside reports patient has a good appetite and he ate all his food  Discussed awaiting EEG.  Informed that his stroke workup is negative so far for acute stroke.  Case discussed with patient nurse, and following his EEG is scheduled for 1:30 p.m.    Objective/physical exam:  General: In no acute distress, afebrile, under weight  Chest: Clear to auscultation bilaterally anteriorly  Heart: S1, S2, no appreciable murmur  Abdomen: Soft, nontender, BS +  MSK: Warm, no lower  extremity edema, no clubbing or cyanosis  Neurologic: Alert and oriented x4, moving all extremities with good strength     VITAL SIGNS: 24 HRS MIN & MAX LAST   Temp  Min: 97.5 °F (36.4 °C)  Max: 98.6 °F (37 °C) 97.8 °F (36.6 °C)   BP  Min: 118/73  Max: 168/87 (!) 153/89     Pulse  Min: 60  Max: 74  71   Resp  Min: 16  Max: 20 18   SpO2  Min: 97 %  Max: 100 % 97 %       Recent Labs   Lab 10/30/22  2113 10/31/22  0651 11/01/22  0319   WBC 4.9 4.4* 4.2*   RBC 6.42* 5.40 5.72   HGB 14.8 12.5* 13.2*   HCT 48.7 40.5* 42.8   MCV 75.9* 75.0* 74.8*   MCH 23.1* 23.1* 23.1*   MCHC 30.4* 30.9* 30.8*   RDW 16.3 15.1 15.1    180 204   MPV 9.3 9.6 10.2       Recent Labs   Lab 10/30/22  2113 10/31/22  0651 11/01/22  0319    139 139   K 5.0 4.0 4.0   CO2 23 25 22*   BUN 14.8 13.4 11.9   CREATININE 1.47* 1.24* 1.14   CALCIUM 9.3 8.4* 8.7*   MG 2.10 1.80  --    ALBUMIN 3.7 2.9*  --    ALKPHOS 80 61  --    ALT 16 13  --    AST 18 15  --    BILITOT 0.7 0.8  --           Microbiology Results (last 7 days)       Procedure Component Value Units Date/Time    Blood culture #1 **CANNOT BE ORDERED STAT** [[id number]]  (Normal) Collected: 10/30/22 2113    Order Status: Completed Specimen: Blood from Arm, Right Updated: 10/31/22 2301     CULTURE, BLOOD ([facility]) No Growth At 24 Hours    Blood culture #2 **CANNOT BE ORDERED STAT** [[id number]]  (Normal) Collected: 10/30/22 2113    Order Status: Completed Specimen: Blood from Arm, Left Updated: 10/31/22 2301     CULTURE, BLOOD ([facility]) No Growth At 24 Hours    Urine culture [[id number]] Collected: 10/31/22 0056    Order Status: Resulted Specimen: Urine Updated: 10/31/22 0349             See below for Radiology    Scheduled Med:   amLODIPine  5 mg Oral Daily    atorvastatin  20 mg Oral Daily    carvediloL  3.125 mg Oral BID WM    clopidogreL  75 mg Oral Daily    donepeziL  10 mg Oral QHS    enoxaparin  40 mg Subcutaneous Daily    fluconazole  100 mg Oral Daily    fluticasone furoate  1 puff  Inhalation Daily    pantoprazole  40 mg Oral Daily    sacubitriL-valsartan  1 tablet Oral BID        Continuous Infusions:   sodium chloride 0.9% 75 mL/hr at 10/31/22 2111        PRN Meds:  acetaminophen, albuterol, labetalol, ondansetron       Assessment/Plan:  Syncope versus CVA versus seizure  Acute kidney injury- improving   Essential hypertension   Hyperlipidemia  Coronary artery disease / PAD with stent LLE - on plavix   Known Hx Prostate cancer 10 yrs ago, Lung cancer S/P radiation and now with Throat cancer S/P radiation (9 out of 30 XRT given unable to tolerate the aside effects)  Current Tobacco use and alcohol use         Admitted as inpatient on 10/31  Neurology consulted., appreciate recs. Awaiting EEG  CVA work up initiated  > MRI Brain - no acute intracranial abnormality, moderate chronic microvascular ischemic changes  > MRA Head and Neck - pending  > Echo - prelim report shows EF 40-45%, mild to mod AR, negative for intracardiac shunt  > Carotid US - less than 50% stenosis right and left ICA and bilateral vertebral artery patent with antegrade flow  > Hemoglobin A1C - 5.6, nondiabetic  > Lipid Panel - within limits  > ESR 17, CRP - 1.65  > UDS -positive for opiates and fentanyl  > Physical, Occupation and Speech Therapy evaluated the patient, appreciate recommendations.  Recommended home with home health  Resumed appropriate home medication for chronic medical condition continue including his antihypertensive given MRI is negative for stroke  Given MRI is negative, I will resume cardiac diet and pt tolerating it well  EEG ordered- planned for 1:30pm today  Continue normal saline at 75 cc/hour for AMNA, creatinine normalized   Morning CBC, BMP stable         VTE Prophylaxis: will be placed on Lovenox for DVT prophylaxis and will be advised to be as mobile as possible and sit in a chair as tolerated        Patient condition:   Fair         Discharge Planning and Disposition/Anticipated discharge:   Possible tomorrow if EEG negative       All diagnosis and differential diagnosis have been reviewed; assessment and plan has been documented; I have personally reviewed the labs and test results that are presently available; I have reviewed the patients medication list; I have reviewed the consulting providers response and recommendations. I have reviewed or attempted to review medical records based upon their availability    All of the patient's questions have been  addressed and answered. Patient's is agreeable to the above stated plan. I will continue to monitor closely and make adjustments to medical management as needed.  _____________________________________________________________________    Nutrition Status:    Radiology:  Echo Saline Bubble? Yes  · Concentric hypertrophy and mildly decreased systolic function.  · The estimated ejection fraction is 40-45%.  · Normal right ventricular size with normal right ventricular systolic   function.  · Mild-to-moderate aortic regurgitation.  · There is no evidence of intracardiac shunting.     MRA Brain without contrast  Narrative: EXAMINATION:  MRA BRAIN WITHOUT CONTRAST; MRA NECK WITH CONTRAST    CLINICAL HISTORY:  Stroke/TIA, determine embolic source;    TECHNIQUE:  Three-dimensional time of flight brain MR angiography and contrast enhanced brain MR angiography of intracranial vessels is performed, and maximum intensity projection reformatted images are presented in multiple three-dimensional rotational projections.    Three-dimensional time of flight brain MR angiography of intracranial vessels is performed, and maximum intensity projection reformatted images are presented in multiple three-dimensional rotational projections.    COMPARISON:  MRI brain the same day    FINDINGS:  MRA cervical:    The origins of the great vessels are patent.    The common carotid arteries, carotid bulbs and internal carotid arteries are patent without flow-limiting stenosis.    The  vertebral arteries are patent.    MRA brain:    The internal carotid arteries are patent with mild multifocal narrowing.  The proximal middle cerebral arteries and anterior cerebral arteries are patent with motion artifact partially obscuring the right MCA bifurcation.    The vertebral arteries, basilar artery and posterior cerebral arteries are patent.  Impression: No large vessel occlusion or flow-limiting stenosis.    Electronically signed by: Martina Christiansen  Date:    10/31/2022  Time:    12:58  MRA Neck with contrast  Narrative: EXAMINATION:  MRA BRAIN WITHOUT CONTRAST; MRA NECK WITH CONTRAST    CLINICAL HISTORY:  Stroke/TIA, determine embolic source;    TECHNIQUE:  Three-dimensional time of flight brain MR angiography and contrast enhanced brain MR angiography of intracranial vessels is performed, and maximum intensity projection reformatted images are presented in multiple three-dimensional rotational projections.    Three-dimensional time of flight brain MR angiography of intracranial vessels is performed, and maximum intensity projection reformatted images are presented in multiple three-dimensional rotational projections.    COMPARISON:  MRI brain the same day    FINDINGS:  MRA cervical:    The origins of the great vessels are patent.    The common carotid arteries, carotid bulbs and internal carotid arteries are patent without flow-limiting stenosis.    The vertebral arteries are patent.    MRA brain:    The internal carotid arteries are patent with mild multifocal narrowing.  The proximal middle cerebral arteries and anterior cerebral arteries are patent with motion artifact partially obscuring the right MCA bifurcation.    The vertebral arteries, basilar artery and posterior cerebral arteries are patent.  Impression: No large vessel occlusion or flow-limiting stenosis.    Electronically signed by: Martina Christiansen  Date:    10/31/2022  Time:    12:58  MRI BRAIN WITHOUT CONTRAST  Narrative:  EXAMINATION:  MRI BRAIN WITHOUT CONTRAST    CLINICAL HISTORY:  Stroke, follow up;    TECHNIQUE:  Multiplanar, multisequence MR images of the brain were obtained without the administration of intravenous contrast.    COMPARISON:  CT head dated 10/30/2022    FINDINGS:  There is no restricted diffusion, hemorrhage or edema. There are scattered small areas of encephalomalacia in the right frontal lobe, bilateral basal ganglia, thalami and bilateral cerebellar hemispheres, with moderate patchy T2/FLAIR hyperintensities in the subcortical and periventricular white matter.    There is no mass effect or midline shift.  The basal cisterns are patent. There is parenchymal volume loss with ex vacuo dilatation of the right frontal horn. There is no abnormal extra-axial fluid collection.  The major intracranial flow voids are patent.  The paranasal sinuses and mastoid air cells are clear.  Impression: 1. No acute intracranial abnormality.  2. Moderate chronic microvascular ischemic changes.    Electronically signed by: Martina Christiansen  Date:    10/31/2022  Time:    12:42  X-Ray Chest AP Portable  Narrative: EXAMINATION:  XR CHEST AP PORTABLE    CLINICAL HISTORY:  fever;    TECHNIQUE:  Single view of the chest    COMPARISON:  07/11/2022    FINDINGS:  No focal opacification, pleural effusion, or pneumothorax.    The cardiomediastinal silhouette is within normal limits.    No acute osseous abnormality.  Impression: No acute cardiopulmonary process.    Electronically signed by: Rakesh Mena  Date:    10/31/2022  Time:    07:19  CT Head Without Contrast  Narrative: Technique:CT of the head was performed without intravenous contrast with axial as well as coronal and sagittal images.    Dosage Information:Automated Exposure Control was utilized 1812.33 mGy.cm.    Clinical history:AMS, unknown cause, +LOC.    Findings:    Hemorrhage:No acute intracranial hemorrhage is seen.    CSF spaces:The ventricles, sulci and basal cisterns all  appear mildly prominent suggesting an element of global cerebral atrophy.    Brain parenchyma:There is no acute large vessel territory infarct.  Pronounced microvascular change is seen in portions of the periventricular and deep white matter tracts. A hypodense focus is seen in the right basal ganglia extending to the right corona radiata (series 2 images 14 to 17). This may reflect a chronic infarct.    Cerebellum:Encephalomalacic changes are seen in the bilateral cerebellar hemispheres.    Vascular:Unremarkable venous sinuses. Mild to moderate atheromatous calcification of the intracranial arteries is seen.    Calvarium:No acute linear or depressed skull fracture is seen.    Maxillofacial Structures:    Paranasal sinuses:The visualized paranasal sinuses appear clear with no significant mucoperiosteal thickening or air fluid levels identified.    Orbits:The bilateral native lenses are absent.  There is some chronic appearing deformity of left lamina papyracea.  Impression: Impression:    No acute intracranial process identified. Details and other findings as noted above.    No significant discrepancy with overnight report.    Electronically signed by: Uri Lugo  Date:    10/31/2022  Time:    07:02      Lawson Leong MD  Department of Hospital Medicine   [facility]   11/01/2022   7:08 AM

## 2022-11-01 NOTE — PT/OT/SLP EVAL
Occupational Therapy   Evaluation    Name: Ezequiel Merchant  MRN: [id number]  Admitting Diagnosis:  Syncope  Recent Surgery: * No surgery found *      Recommendations:     Discharge Recommendations: home with home health  Discharge Equipment Recommendations:  none  Barriers to discharge:  None    Assessment:     Ezequiel Merchant is a 82 y.o. male with a medical diagnosis of Syncope.  He presents with weakness and the following performance deficits affecting function: gait instability, weakness, impaired functional mobility, decreased safety awareness, impaired balance, impaired self care skills.      Rehab Prognosis: Good; patient would benefit from acute skilled OT services to address these deficits and reach maximum level of function.       Plan:     Patient to be seen 5 x/week to address the above listed problems via self-care/home management, therapeutic exercises, therapeutic activities  Plan of Care Expires: 12/06/22  Plan of Care Reviewed with: patient, significant other    Subjective     Chief Complaint: Weakness  Patient/Family Comments/goals: None    Occupational Profile:  Living Environment: Lives SS home with girlfriend, ramp to enter, shower/tub combo  Previous level of function: Independent - uses SBQC and rollator for ambulate at baseline  Roles and Routines: Retire    Equipment Used at Home:  cane, quad, rollator, grab bars in bathroom   Assistance upon Discharge: Per pt's significant other report, she is retired and will be available to assist pt as needed     Pain/Comfort:  Pain Rating 1: 0/10    Patients cultural, spiritual, Jew conflicts given the current situation: no    Objective:     Communicated with: nsg and PT prior to session.  Patient found sitting edge of bed with peripheral IV upon OT entry to room.    General Precautions: Standard, fall, aspiration, seizure   Orthopedic Precautions:N/A   Braces: N/A  Respiratory Status: Room air    Occupational  Performance:    Functional Mobility/Transfers:  Patient completed Sit <> Stand Transfer with stand by assistance  with  rollator @ EOB   Functional Mobility: Pt ambulated ~80 ft inhallway with CGA with rollator - pt presents with flexed posture during ambulation    Cognitive/Visual Perceptual:  Intact    Physical Exam:  B UE WFL    Treatment & Education:  Pt and significant other educated on POC and saftey    Patient left up in chair with all lines intact and significant other and CNA present    GOALS:   Multidisciplinary Problems       Occupational Therapy Goals          Problem: Occupational Therapy    Goal Priority Disciplines Outcome Interventions   Occupational Therapy Goal     OT, PT/OT Ongoing, Progressing    Description: Goals to be met by: 12/6/22     Patient will increase functional independence with ADLs by performing:    UE Dressing with Modified [place].  LE Dressing with Modified [place].  Grooming while standing with Modified [place].  Toileting from toilet with Modified [place] for hygiene and clothing management.   Toilet transfer to toilet with Modified [place].                         History:     Past Medical History:   Diagnosis Date    CAD in native artery     Dementia     DJD (degenerative joint disease)     GERD (gastroesophageal reflux disease)     Hallux valgus (acquired), unspecified foot     Hammer toe, acquired     Hx of cancer of lung     Hypercholesteremia     Hypertension     Lung cancer     Onychomycosis of toenail     PAD (peripheral artery disease)     PVD (peripheral vascular disease)     Tinea pedis     Tobacco user     Unspecified dementia without behavioral disturbance          Past Surgical History:   Procedure Laterality Date    BACK SURGERY      CORONARY STENT PLACEMENT      DEBRIDEMENT OF FOOT Left     ESOPHAGOGASTRODUODENOSCOPY N/A 08/02/2022    Procedure: EGD w/ dil;  Surgeon: Pollo Hairston MD;  Location: [facility] ENDOSCOPY;  Service:  Gastroenterology;  Laterality: N/A;    gastrointestinal biopsy      TOE AMPUTATION Left        Time Tracking:     OT Date of Treatment:    OT Start Time: 0908  OT Stop Time: 0920  OT Total Time (min): 12 min    Billable Minutes:Evaluation Mod Complexity - 12 min    11/1/2022

## 2022-11-01 NOTE — PT/OT/SLP EVAL
Physical Therapy Evaluation    Patient Name:  Ezequiel Merchant   MRN:  [id number]    Recommendations:     Discharge Recommendations:  home with home health   Discharge Equipment Recommendations:     Barriers to discharge: None    Assessment:     Ezequiel Merchant is a 82 y.o. male admitted with a medical diagnosis of Syncope.  He presents with the following impairments/functional limitations:  gait instability, impaired balance, weakness. The pt tolerated session welll. Pt lives at home with is girlfriend, who is with him at all times. The pt uses a rollator and a SBQC for ambulation at home. Pt and girlfriend state that pt's mobility is near his baseline. Pt will likely need 1 more PT session to ensure safety when returning home.     Rehab Prognosis: Good; patient would benefit from acute skilled PT services to address these deficits and reach maximum level of function.    Recent Surgery: * No surgery found *      Plan:     During this hospitalization, patient to be seen 5 x/week to address the identified rehab impairments via gait training, therapeutic activities, therapeutic exercises and progress toward the following goals:    Plan of Care Expires:  12/01/22    Subjective     Chief Complaint: none  Patient/Family Comments/goals: return home.   Pain/Comfort:       Patients cultural, spiritual, Scientology conflicts given the current situation:      Living Environment:  Home with girlfriend is  home, ramp to enter.   Prior to admission, patients level of function was independent.  Equipment used at home: cane, quad, rollator.  DME owned (not currently used):  none .  Upon discharge, patient will have assistance from girlfriend.    Objective:     Communicated with NSG prior to session.  Patient found sitting edge of bed with peripheral IV  upon PT entry to room.    General Precautions: Standard,     Orthopedic Precautions:N/A   Braces: N/A  Respiratory Status: Room air    Exams:  RLE ROM:  WFL  RLE Strength: WFL  LLE ROM: WFL  LLE Strength: WFL    Functional Mobility:  Transfers:     Sit to Stand:  stand by assistance with rollator  Gait: 80 feet, CGA with rollator. Pt demos forward flexed posture with ambulation.  Balance: Good- static sitting balance.      Patient left up in chair with all lines intact, call button in reach, and CNA and girlfriend present.    GOALS:   Multidisciplinary Problems       Physical Therapy Goals          Problem: Physical Therapy    Goal Priority Disciplines Outcome Goal Variances Interventions   Physical Therapy Goal     PT, PT/OT Ongoing, Progressing     Description: Goals to be met by: 22     Patient will increase functional independence with mobility by performin. Supine to sit with Modified [place]  2. Sit to stand transfer with Modified [place]  3. Gait  x 200 feet with Modified [place] using rollator vs SBQC.                          History:     Past Medical History:   Diagnosis Date    CAD in native artery     Dementia     DJD (degenerative joint disease)     GERD (gastroesophageal reflux disease)     Hallux valgus (acquired), unspecified foot     Hammer toe, acquired     Hx of cancer of lung     Hypercholesteremia     Hypertension     Lung cancer     Onychomycosis of toenail     PAD (peripheral artery disease)     PVD (peripheral vascular disease)     Tinea pedis     Tobacco user     Unspecified dementia without behavioral disturbance        Past Surgical History:   Procedure Laterality Date    BACK SURGERY      CORONARY STENT PLACEMENT      DEBRIDEMENT OF FOOT Left     ESOPHAGOGASTRODUODENOSCOPY N/A 2022    Procedure: EGD w/ dil;  Surgeon: Pollo Hariston MD;  Location: [facility] ENDOSCOPY;  Service: Gastroenterology;  Laterality: N/A;    gastrointestinal biopsy      TOE AMPUTATION Left        Time Tracking:     PT Received On: 22  PT Start Time: 908     PT Stop Time: 920  PT Total Time (min): 12 min     Billable  Minutes: Evaluation 12 mins      11/01/2022

## 2022-11-02 PROBLEM — R55 SYNCOPE: Status: RESOLVED | Noted: 2022-01-01 | Resolved: 2022-01-01

## 2022-11-02 NOTE — PT/OT/SLP EVAL
Speech Language Pathology Department  Cognitive-Communication Evaluation    Patient Name:  Ezequiel Merchant   MRN:  [id number]  Admitting Diagnosis: Witnessed seizure-like activity    Recommendations:     General recommendations:  SLP intervention not indicated  Communication strategies:  none    Discharge recommendations:      Barriers to safe discharge: level of skilled assistance needed    History:     Past Medical History:   Diagnosis Date    CAD in native artery     Dementia     DJD (degenerative joint disease)     GERD (gastroesophageal reflux disease)     Hallux valgus (acquired), unspecified foot     Hammer toe, acquired     Hx of cancer of lung     Hypercholesteremia     Hypertension     Lung cancer     Onychomycosis of toenail     PAD (peripheral artery disease)     PVD (peripheral vascular disease)     Tinea pedis     Tobacco user     Unspecified dementia without behavioral disturbance        Past Surgical History:   Procedure Laterality Date    BACK SURGERY      CORONARY STENT PLACEMENT      DEBRIDEMENT OF FOOT Left     ESOPHAGOGASTRODUODENOSCOPY N/A 08/02/2022    Procedure: EGD w/ dil;  Surgeon: Pollo Hairston MD;  Location: [facility] ENDOSCOPY;  Service: Gastroenterology;  Laterality: N/A;    gastrointestinal biopsy      TOE AMPUTATION Left        Previous level of Function  Occupation: retired  Lives: with caregiver  Handed: Right  Glasses: no  Hearing Aids: no    Subjective     Patient awake, alert, and cooperative.    Patient goals: To go home     Pain/Comfort:   Denied complaints    Respiratory Status: rm air    Objective:     SPEECH PRODUCTION  Phoneme Production: Imprecise consonant production  Voice Quality: adequate  Voice Production: adequate  Speech Rate: adequate  Loudness: reduced  Respiration: adequate  Resonance: adequate  Prosody: adequate  Speech Intelligibility  Known Context: 75%-90%  Unknown Context: 50%-75%    AUDITORY COMPREHENSION    Following Directions:  1-Step:  WFL  2-Step: WFL  Yes/No Questions:  Biographical: WFL  Environmental: WFL  Simple: WFL  Complex: WFL    VERBAL EXPRESSION  Automatic Speech:  Functional needs: WFL  Days of the week: WFL  Months of the year: impaired, named 5 out of order  Counting: WFL    Confrontation Naming  Body Parts: WFL  Objects: WFl  Wh- Questions:  Object name: WFL  Object function: WFL    COGNITION  Orientation:  Person: [facility]  Place: [facility]  Time: WFL  Situation: WFL    Memory:  Long Term: impaired  Problem Solving  Functional simple: WFL  Organization:  Convergent thinking: WFL  Divergent thinking: WFL      Assessment:     Pt presents with functional speech and language skills. Cognitive-linguistic skills note to be at baseline.    Goals:   Multidisciplinary Problems       SLP Goals       Not on file                    Plan:     Patient to be seen:      Plan of Care expires:     Plan of Care reviewed with:      SLP Follow-Up:          Time Tracking:     SLP Treatment Date:    11/2/22  Speech Start Time:     Speech Stop Time:        Speech Total Time (min):   15    Billable minutes:  Evaluation of Speech Sound Production with Comprehension and Expression, 15      11/02/2022

## 2022-11-02 NOTE — PLAN OF CARE
11/02/22 0935   Discharge Assessment   Assessment Type Discharge Planning Assessment   Confirmed/corrected address, phone number and insurance Yes   Confirmed Demographics Correct on Facesheet   Source of Information patient   When was your last doctors appointment?   (Patient reports last month.)   Communicated MOHAN with patient/caregiver Yes   Reason For Admission Syncope   Lives With significant other;other relative(s)   Do you expect to return to your current living situation? Yes   Do you have help at home or someone to help you manage your care at home? Yes   Who are your caregiver(s) and their phone number(s)? Friend: Patience Peck: [phone]   Prior to hospitilization cognitive status: Unable to Assess   Current cognitive status: Alert/Oriented   Walking or Climbing Stairs Difficulty ambulation difficulty, requires equipment   Dressing/Bathing Difficulty bathing difficulty, assistance 1 person   Home Accessibility wheelchair accessible   Home Layout Able to live on 1st floor   Equipment Currently Used at Home cane, straight;rollator;other (see comments)  (Scooter.)   Readmission within 30 days? No   Patient currently being followed by outpatient case management? No   Do you currently have service(s) that help you manage your care at home? No   Do you take prescription medications? Yes   Do you have prescription coverage? Yes   Coverage [employer] HMO   Do you have any problems affording any of your prescribed medications? No   Is the patient taking medications as prescribed? yes   Who is going to help you get home at discharge? Friend, Patience Peck.   How do you get to doctors appointments? family or friend will provide   Are you on dialysis? No   Do you take coumadin? No   Discharge Plan A Home with family   Discharge Plan B Home   DME Needed Upon Discharge  none   Discharge Plan discussed with: Friend;Patient   Name(s) and Number(s) Patience Cisco: [phone]   Discharge Barriers  Identified None   Relationship/Environment   Name(s) of Who Lives With Patient Patient resides with his friend, Patience and Patience's adult children     No barriers to discharge at this time.

## 2022-11-02 NOTE — PLAN OF CARE
DILLON contacted [facility] ([phone]) regarding getting pt established as a new pt. Dillon spoke with Virgie at [facility] and was informed that once pt's referral is received, the provider will need to review the chart, accept or deny case and if accepted pt will be contacted with an apt date and time. DILLON was also told that referrals will not be review the same day due to high volumes of referrals because it is a speciality clinic that consists of five other entities. DILLON notified pt's nurse, Cheryl and Dr. Leong with information obtained.

## 2022-11-02 NOTE — PLAN OF CARE
Pt is accepted by [facility] Phone: [phone] via [employer].

## 2022-11-02 NOTE — PLAN OF CARE
Problem: Adult Inpatient Plan of Care  Goal: Plan of Care Review  Outcome: Ongoing, Progressing  Flowsheets (Taken 11/1/2022 2107)  Plan of Care Reviewed With:   patient   spouse  Goal: Patient-Specific Goal (Individualized)  Outcome: Ongoing, Progressing  Flowsheets (Taken 11/1/2022 2107)  Patient-Specific Goals (Include Timeframe): to be able to sleep tonight  Goal: Absence of Hospital-Acquired Illness or Injury  Outcome: Ongoing, Progressing  Intervention: Identify and Manage Fall Risk  Flowsheets (Taken 11/1/2022 2107)  Safety Promotion/Fall Prevention:   assistive device/personal item within reach   instructed to call staff for mobility   Fall Risk reviewed with patient/family   nonskid shoes/socks when out of bed  Intervention: Prevent Infection  Flowsheets (Taken 11/1/2022 2107)  Infection Prevention:   hand hygiene promoted   single patient room provided  Goal: Optimal Comfort and Wellbeing  Outcome: Ongoing, Progressing  Intervention: Provide Person-Centered Care  Flowsheets (Taken 11/1/2022 2107)  Trust Relationship/Rapport:   care explained   questions answered   choices provided   questions encouraged   reassurance provided   thoughts/feelings acknowledged

## 2022-11-02 NOTE — DISCHARGE SUMMARY
[facility] Hospital Medicine Discharge Summary    Admit Date: 10/30/2022  Discharge Date and Time: 11/2/20229:58 AM  Admitting Physician: FANNIE Team  Discharging Physician: Lawson Leong MD.  Primary Care Physician: Jamar Lenz DO  Consults: Neurology    Discharge Diagnoses:  Syncope versus CVA versus seizure  Acute kidney injury- improving   Essential hypertension   Hyperlipidemia  Coronary artery disease / PAD with stent LLE - on plavix   Known Hx Prostate cancer 10 yrs ago, Lung cancer S/P radiation and now with Throat cancer S/P radiation (9 out of 30 XRT given unable to tolerate the aside effects)  Current Tobacco use and alcohol use     Hospital Course:   82-year-old male with known past medical history of hypertension, hyperlipidemia, coronary artery disease, peripheral vascular disease with stent in the left lower extremity on Plavix, history of prostate cancer 10 years ago when lung cancer post radiation and now with throat cancer status post radiation which the friend at bedside reports he could only take 9 out of 30 sessions asit made him very sick and unable to tolerate admitted 10/31 after he had 2 episodes of passing out at home.  Both those episodes were witnessed by his friend and her daughter,  and reported that during those episodes, she noted some shaking of the right upper extremity.  When the patient came out of those episode, he was confused and his speech was incoherent.  Patient does not recall these episodes.  The friend at bedside is unable to inform how long the episode lasted.  Patient is currently denying any chest pain, shortness of breath, abdominal pain, nausea, vomiting, headache, dizziness or visual changes.  He denies weakness.  Reported he eats a regular diet, he ambulates and performs his ADLs at home without difficulty independently.  Discussed with patient family that we will order MRI of the brain, consult Neurology, verbalized understanding    Admitted as inpatient on 10/31  Neurology consulted., appreciate recs. Awaiting EEG  CVA work up initiated  > MRI Brain - no acute intracranial abnormality, moderate chronic microvascular ischemic changes  > MRA Head and Neck - pending  > Echo - prelim report shows EF 40-45%, mild to mod AR, negative for intracardiac shunt  > Carotid US - less than 50% stenosis right and left ICA and bilateral vertebral artery patent with antegrade flow  > Hemoglobin A1C - 5.6, nondiabetic  > Lipid Panel - within limits  > ESR 17, CRP - 1.65  > UDS -positive for opiates and fentanyl  > Physical, Occupation and Speech Therapy evaluated the patient, appreciate recommendations.  Recommended home with home health  Resumed appropriate home medication for chronic medical condition continue including his antihypertensive given MRI is negative for stroke  Given MRI is negative, I will resume cardiac diet and pt tolerating it well  EEG is negative but symptoms are typical of seizures   Case personally discussed with Dr. Peña, recommended discharged on Keppra and to follow up with Neurology in outpatient setting for further eval and treatments.  They may or may not consider MRI brain with and without contrast if indicated at that point         Pt was seen and examined on the day of discharge  Vitals:  VITAL SIGNS: 24 HRS MIN & MAX LAST   Temp  Min: 98.1 °F (36.7 °C)  Max: 99 °F (37.2 °C) 98.1 °F (36.7 °C)   BP  Min: 102/64  Max: 151/81 (!) 146/82     Pulse  Min: 61  Max: 76  61   Resp  Min: 16  Max: 20 16   SpO2  Min: 96 %  Max: 99 % 99 %       Physical Exam:  General: In no acute distress, afebrile, under weight  Chest: Clear to auscultation bilaterally anteriorly  Heart: S1, S2, no appreciable murmur  Abdomen: Soft, nontender, BS +  MSK: Warm, no lower extremity edema, no clubbing or cyanosis  Neurologic: Alert and oriented x4, moving all extremities with good strength     Procedures Performed: EEG    Significant Diagnostic Studies:  See Full reports for all details    Recent Labs   Lab 10/30/22  2113 10/31/22  0651 11/01/22  0319   WBC 4.9 4.4* 4.2*   RBC 6.42* 5.40 5.72   HGB 14.8 12.5* 13.2*   HCT 48.7 40.5* 42.8   MCV 75.9* 75.0* 74.8*   MCH 23.1* 23.1* 23.1*   MCHC 30.4* 30.9* 30.8*   RDW 16.3 15.1 15.1    180 204   MPV 9.3 9.6 10.2       Recent Labs   Lab 10/30/22  2113 10/31/22  0651 11/01/22  0319    139 139   K 5.0 4.0 4.0   CO2 23 25 22*   BUN 14.8 13.4 11.9   CREATININE 1.47* 1.24* 1.14   CALCIUM 9.3 8.4* 8.7*   MG 2.10 1.80  --    ALBUMIN 3.7 2.9*  --    ALKPHOS 80 61  --    ALT 16 13  --    AST 18 15  --    BILITOT 0.7 0.8  --         Microbiology Results (last 7 days)       Procedure Component Value Units Date/Time    Urine culture [[id number]] Collected: 10/31/22 0056    Order Status: Completed Specimen: Urine Updated: 11/02/22 0853     Urine Culture No Growth    Blood culture #1 **CANNOT BE ORDERED STAT** [[id number]]  (Normal) Collected: 10/30/22 2113    Order Status: Completed Specimen: Blood from Arm, Right Updated: 11/01/22 2300     CULTURE, BLOOD ([facility]) No Growth At 48 Hours    Blood culture #2 **CANNOT BE ORDERED STAT** [[id number]]  (Normal) Collected: 10/30/22 2113    Order Status: Completed Specimen: Blood from Arm, Left Updated: 11/01/22 2300     CULTURE, BLOOD ([facility]) No Growth At 48 Hours             Echo Saline Bubble? Yes  · Concentric hypertrophy and mildly decreased systolic function.  · The estimated ejection fraction is 40-45%.  · Normal right ventricular size with normal right ventricular systolic   function.  · Mild-to-moderate aortic regurgitation.  · There is no evidence of intracardiac shunting.     MRA Brain without contrast  Narrative: EXAMINATION:  MRA BRAIN WITHOUT CONTRAST; MRA NECK WITH CONTRAST    CLINICAL HISTORY:  Stroke/TIA, determine embolic source;    TECHNIQUE:  Three-dimensional time of flight brain MR angiography and contrast enhanced brain MR angiography of intracranial vessels is  performed, and maximum intensity projection reformatted images are presented in multiple three-dimensional rotational projections.    Three-dimensional time of flight brain MR angiography of intracranial vessels is performed, and maximum intensity projection reformatted images are presented in multiple three-dimensional rotational projections.    COMPARISON:  MRI brain the same day    FINDINGS:  MRA cervical:    The origins of the great vessels are patent.    The common carotid arteries, carotid bulbs and internal carotid arteries are patent without flow-limiting stenosis.    The vertebral arteries are patent.    MRA brain:    The internal carotid arteries are patent with mild multifocal narrowing.  The proximal middle cerebral arteries and anterior cerebral arteries are patent with motion artifact partially obscuring the right MCA bifurcation.    The vertebral arteries, basilar artery and posterior cerebral arteries are patent.  Impression: No large vessel occlusion or flow-limiting stenosis.    Electronically signed by: Martina Christiansen  Date:    10/31/2022  Time:    12:58  MRA Neck with contrast  Narrative: EXAMINATION:  MRA BRAIN WITHOUT CONTRAST; MRA NECK WITH CONTRAST    CLINICAL HISTORY:  Stroke/TIA, determine embolic source;    TECHNIQUE:  Three-dimensional time of flight brain MR angiography and contrast enhanced brain MR angiography of intracranial vessels is performed, and maximum intensity projection reformatted images are presented in multiple three-dimensional rotational projections.    Three-dimensional time of flight brain MR angiography of intracranial vessels is performed, and maximum intensity projection reformatted images are presented in multiple three-dimensional rotational projections.    COMPARISON:  MRI brain the same day    FINDINGS:  MRA cervical:    The origins of the great vessels are patent.    The common carotid arteries, carotid bulbs and internal carotid arteries are patent without  flow-limiting stenosis.    The vertebral arteries are patent.    MRA brain:    The internal carotid arteries are patent with mild multifocal narrowing.  The proximal middle cerebral arteries and anterior cerebral arteries are patent with motion artifact partially obscuring the right MCA bifurcation.    The vertebral arteries, basilar artery and posterior cerebral arteries are patent.  Impression: No large vessel occlusion or flow-limiting stenosis.    Electronically signed by: Martina Christiansen  Date:    10/31/2022  Time:    12:58  MRI BRAIN WITHOUT CONTRAST  Narrative: EXAMINATION:  MRI BRAIN WITHOUT CONTRAST    CLINICAL HISTORY:  Stroke, follow up;    TECHNIQUE:  Multiplanar, multisequence MR images of the brain were obtained without the administration of intravenous contrast.    COMPARISON:  CT head dated 10/30/2022    FINDINGS:  There is no restricted diffusion, hemorrhage or edema. There are scattered small areas of encephalomalacia in the right frontal lobe, bilateral basal ganglia, thalami and bilateral cerebellar hemispheres, with moderate patchy T2/FLAIR hyperintensities in the subcortical and periventricular white matter.    There is no mass effect or midline shift.  The basal cisterns are patent. There is parenchymal volume loss with ex vacuo dilatation of the right frontal horn. There is no abnormal extra-axial fluid collection.  The major intracranial flow voids are patent.  The paranasal sinuses and mastoid air cells are clear.  Impression: 1. No acute intracranial abnormality.  2. Moderate chronic microvascular ischemic changes.    Electronically signed by: Martina Christiansen  Date:    10/31/2022  Time:    12:42  X-Ray Chest AP Portable  Narrative: EXAMINATION:  XR CHEST AP PORTABLE    CLINICAL HISTORY:  fever;    TECHNIQUE:  Single view of the chest    COMPARISON:  07/11/2022    FINDINGS:  No focal opacification, pleural effusion, or pneumothorax.    The cardiomediastinal silhouette is within normal  limits.    No acute osseous abnormality.  Impression: No acute cardiopulmonary process.    Electronically signed by: Rakesh Mena  Date:    10/31/2022  Time:    07:19  CT Head Without Contrast  Narrative: Technique:CT of the head was performed without intravenous contrast with axial as well as coronal and sagittal images.    Dosage Information:Automated Exposure Control was utilized 1812.33 mGy.cm.    Clinical history:AMS, unknown cause, +LOC.    Findings:    Hemorrhage:No acute intracranial hemorrhage is seen.    CSF spaces:The ventricles, sulci and basal cisterns all appear mildly prominent suggesting an element of global cerebral atrophy.    Brain parenchyma:There is no acute large vessel territory infarct.  Pronounced microvascular change is seen in portions of the periventricular and deep white matter tracts. A hypodense focus is seen in the right basal ganglia extending to the right corona radiata (series 2 images 14 to 17). This may reflect a chronic infarct.    Cerebellum:Encephalomalacic changes are seen in the bilateral cerebellar hemispheres.    Vascular:Unremarkable venous sinuses. Mild to moderate atheromatous calcification of the intracranial arteries is seen.    Calvarium:No acute linear or depressed skull fracture is seen.    Maxillofacial Structures:    Paranasal sinuses:The visualized paranasal sinuses appear clear with no significant mucoperiosteal thickening or air fluid levels identified.    Orbits:The bilateral native lenses are absent.  There is some chronic appearing deformity of left lamina papyracea.  Impression: Impression:    No acute intracranial process identified. Details and other findings as noted above.    No significant discrepancy with overnight report.    Electronically signed by: Uri Lugo  Date:    10/31/2022  Time:    07:02         Medication List        START taking these medications      levETIRAcetam 500 MG Tab  Commonly known as: KEPPRA  Take 1 tablet (500 mg total)  by mouth 2 (two) times daily.            CONTINUE taking these medications      albuterol 90 mcg/actuation inhaler  Commonly known as: PROVENTIL/VENTOLIN HFA     amLODIPine 5 MG tablet  Commonly known as: NORVASC     atorvastatin 20 MG tablet  Commonly known as: LIPITOR     carvediloL 3.125 MG tablet  Commonly known as: COREG     clopidogreL 75 mg tablet  Commonly known as: PLAVIX     donepeziL 10 MG tablet  Commonly known as: ARICEPT     ENTRESTO 49-51 mg per tablet  Generic drug: sacubitriL-valsartan     fluconazole 100 MG tablet  Commonly known as: DIFLUCAN     fluticasone propionate 220 mcg/actuation inhaler  Commonly known as: FLOVENT HFA     omeprazole 20 MG capsule  Commonly known as: PRILOSEC               Where to Get Your Medications        These medications were sent to [id number] - [address] [address]      Phone: [phone]   levETIRAcetam 500 MG Tab          Explained in detail to the patient about the discharge plan, medications, and follow-up visits. Pt understands and agrees with the treatment plan  Discharge Disposition: home with home health  Discharged Condition: stable  Diet-   Dietary Orders (From admission, onward)       Start     Ordered    11/01/22 1659  Dietary nutrition supplements Boost Plus Chocolate; TID  Continuous        Comments: Or strawberry as desired   Question Answer Comment   Select PO Supplement: Boost Plus Chocolate    Frequency: TID        11/01/22 1659    10/31/22 1304  Diet heart healthy  Diet effective now         10/31/22 1303                   Medications Per DC med rec  Activities as tolerated   Follow-up Information       Jamar Lenz DO. Schedule an appointment as soon as possible for a visit in 2 week(s).    Specialty: Internal Medicine  Why: post discharge  Contact information:  [address] [facility] [address] [phone]               f/u with your primary oncologist as scheduled  Follow up.                           For further questions contact hospitalist office    Discharge time 36 minutes    For worsening symptoms, chest pain, shortness of breath, increased abdominal pain, high grade fever, stroke or stroke like symptoms, immediately go to the nearest Emergency Room or call 911 as soon as possible.      Lawson Leong MD  Department of Hospital Medicine   [facility]   11/02/2022 9:58 AM

## 2022-11-03 NOTE — PLAN OF CARE
82 year old male admitted on 10/30/22 with syncope.  PMH of HTN, HLD, CAD, peripheral vascular disease status post PCI, prostate cancer, lung cancer status post radiation, throat cancer status post radiation.  Initial hemodynamic stability, no fever, no hypoxia.  No evidence of orthostatic hypotension.  Neurology consulted.  MRI unremarkable.  EEG normal.  Neurology has placed the patient on Keppra.  Inpatient level of care is not medically necessary 2/2 lack of the following:    Hemodynamic instability  Status epilepticus[A](7)(8)  Brain disorder (eg, tumor, edema, trauma, hydrocephalus, encephalitis, meningitis) that requires monitoring or intervention available only at inpatient level of care  Etiology (eg, drug toxicity, withdrawal, nonadherence) that requires monitoring or intervention available only at inpatient level of care or that persists despite observation care(9)  Altered mental status that is severe or persistent  New focal neurologic deficit (eg, postseizure)  Metabolic disorder (eg, hypoglycemia, electrolyte abnormality) that persists despite observation care  Recurrent seizure (ie, during emergency department or observation care) and  Outpatient antiepileptic regimen cannot be established (eg, patient cannot tolerate medication, initiation requires inpatient care).[B]  Cerebral bleeding, hydrocephalus, or vasospasm monitoring(10)  Increased intracranial pressure or cerebral edema monitoring    Patient was appropriate for OBS LOC    Pamella Ramirez MD  Utilization Management  Physician Advisor

## 2022-11-07 NOTE — PHYSICIAN QUERY
PT Name: Ezequiel Merchant  MR #: [id number]    DOCUMENTATION CLARIFICATION     CDS: Darline Alfredo RN         Contact information: [email or web address] or (cell) [phone]     This form is a permanent document in the medical record.     Query Date: November 7, 2022    By submitting this query, we are merely seeking further clarification of documentation.. Please utilize your independent clinical judgment when addressing the question(s) below.    The medical record contains the following:   Indicators  Supporting Clinical Findings Location in Medical Record   x Energy Intake Energy Intake: < 75% of estimated energy requirement for > 7 days   Nutrition PN 11/1   x Weight Loss Interpretation of Weight Loss: 5% In 1 month   Nutrition PN 11/1   x Fat Loss Body Fat:mild depletion   Nutrition PN 11/1   x Muscle Loss Muscle Mass Loss: mild depletion   Nutrition PN 11/1    Edema/Fluid Accumulation      Reduced  Strength (by dynamometer)     x Weight, BMI, Usual Body Weight BMI (Calculated): 21.3  BMI Classification: normal (BMI 18.5-24.9)  Ideal Body Weight (IBW), Male: 136 lb  % Ideal Body Weight, Male (lb): 94.12 %   Nutrition PN 11/1    Delayed Wound Healing     x Registered Dietician Diagnosis Malnutrition in the context of acute illness or injury  Degree of Malnutrition: non-severe (moderate) malnutrition   Nutrition PN 11/1   x Acute or Chronic Illness Lung cancer s/p radiation  Prostate cancer   Throat cancer s/p radiation  Dementia  Alcohol - Former 1/2 pint of Gin daily; Quit 2 months ago   H&P 10/31    Social or Environmental Circumstances     x Treatment Continue heart healthy diet as tolerated.   Add chocolate or strawberry Boost Plus TID. Provides 360 kcals and 14 g protein per serving    Nutrition PN 11/1   x Other PES: Malnutrition related to energy intake < energy demand as evidenced by unintentional weight loss, decreased po intake, muscle and fat losses. (new)    General appearance:  Well-developed, well-nourished male in no apparent distress         H&P 10/31     Academy of Nutrition and Dietetics (Academy) and the American Society for Parenteral and Enteral Nutrition (A.S.P.E.N.) Clinical Characteristics to support Malnutrition   Malnutrition in the Context of Acute Illness or Injury Malnutrition in the Context of Chronic Illness or Injury Malnutrition in the Context of Social or Environmental Circumstances   Malnutrition Level Moderate Severe Moderate Severe   Moderate   Severe   Energy Intake <75%                   >7 days <50%                 >5 days <75%           >1 month <75%                      >1 month   <75% for >3 months   <50% for >1 month   Weight Loss   1-2% in 1 week >2% in 1 week 5% in 1 month >5% in 1 month 5% in 1 month >5% in 1 month    5% in 1 month >5% in 1 month 7.5% in 3 months >7.5% in 3 months 7.5% in 3 months >7.5% in 3 months    7.5% in 3 months >7.5% in 3 months 10% in 6 months >10% in 6 months 10% in 6 months >10% in 6 months        20% in 1 year                    >20% in 1 year                                                                  20% in 1 year                            >20% in 1 year                                                  Subcutaneous Fat Loss Mild  Moderate  Mild  Severe    Mild   Severe   Muscle Loss Mild depletion Moderate depletion  Mild depletion Severe Depletion   Mild   Severe   Edema/Fluid Accumulation Mild Moderate to severe  Mild  Severe   Mild   Severe   Reduced  Strength         (based on standards supplied by  of dynamometer) N/A Measurably reduced N/A Measurably reduced N/A Measurably reduced     Criteria for mild malnutrition is defined as 1 characteristic outlined above within the established moderate or severe parameters.  A minimum of 2 out of the 6 characteristics noted above are recommended for a diagnosis of moderate or severe malnutrition.  Chronic illness/injury is a disease/condition lasting 3 months  or longer.    The noted clinical guidelines are only system guidelines and do not replace the providers clinical judgment.    Provider, please specify diagnosis or diagnoses associated with above clinical findings.    [  ] Mild Malnutrition - 1 characteristic outlined above within the established moderate or severe parameters     [ x ] Moderate Malnutrition - a minimum of 2 of the 6 moderate malnutrition characteristics noted above      [  ] Malnutrition, Unspecified degree     [  ] Other Nutritional Diagnosis (please specify): _______     [  ] Malnutrition ruled out     [  ] Clinically Undetermined       References:    LIZBETH Winslow, & JAMAR Michaels (2022, April). Assessment and management of anorexia and cachexia in palliative care. Retrieved May 23, 2022, from [email or web address]-[email or web address]-management-[email or web address]-[email or web address]-cachexia-[email or web address]-[email or web address]-care?[email or web address]=see_link     BONNIE Ordaz, PhD, RD, Zelda CANTU P., PhD, RN, MYRTLE Nuñez MD, PhD, Memo RODRIGUEZ A., MS, RD, [facility], JESUS Mcmullen, MS, RD, The Academy Malnutrition Work Group, The A.S.P.E.N. Board of Directors. (2012). Consensus Statement: Academy of Nutrition and Dietetics and American Society for Parenteral and Enteral Nutrition: Characteristics Recommended for the Identification and Documentation of Adult Malnutrition (Undernutrition). Journal of Parenteral and Enteral Nutrition, 36(3), 275-283. doi:10.1177/8469656102915475     Form No. [id number]

## 2023-01-01 ENCOUNTER — HOSPITAL ENCOUNTER (OUTPATIENT)
Facility: HOSPITAL | Age: 83
Discharge: HOME | End: 2023-03-20
Attending: FAMILY MEDICINE | Admitting: INTERNAL MEDICINE
Payer: MEDICARE

## 2023-01-01 ENCOUNTER — OFFICE VISIT (OUTPATIENT)
Dept: RADIATION THERAPY | Facility: HOSPITAL | Age: 83
End: 2023-01-01
Attending: RADIOLOGY
Payer: MEDICARE

## 2023-01-01 ENCOUNTER — HOSPITAL ENCOUNTER (INPATIENT)
Facility: HOSPITAL | Age: 83
LOS: 7 days | Discharge: HOSPICE | DRG: 374 | End: 2023-07-28
Attending: STUDENT IN AN ORGANIZED HEALTH CARE EDUCATION/TRAINING PROGRAM | Admitting: HOSPITALIST
Payer: MEDICARE

## 2023-01-01 ENCOUNTER — TELEPHONE (OUTPATIENT)
Dept: HEMATOLOGY/ONCOLOGY | Facility: CLINIC | Age: 83
End: 2023-01-01
Payer: MEDICARE

## 2023-01-01 ENCOUNTER — CLINICAL SUPPORT (OUTPATIENT)
Dept: REHABILITATION | Facility: HOSPITAL | Age: 83
End: 2023-01-01
Attending: INTERNAL MEDICINE
Payer: MEDICARE

## 2023-01-01 ENCOUNTER — ANESTHESIA (OUTPATIENT)
Dept: ENDOSCOPY | Facility: HOSPITAL | Age: 83
DRG: 374 | End: 2023-01-01
Payer: MEDICARE

## 2023-01-01 ENCOUNTER — ANESTHESIA EVENT (OUTPATIENT)
Dept: SURGERY | Facility: HOSPITAL | Age: 83
DRG: 374 | End: 2023-01-01
Payer: MEDICARE

## 2023-01-01 ENCOUNTER — HOSPITAL ENCOUNTER (OUTPATIENT)
Dept: RADIOLOGY | Facility: HOSPITAL | Age: 83
Discharge: HOME | End: 2023-07-07
Attending: INTERNAL MEDICINE
Payer: MEDICARE

## 2023-01-01 ENCOUNTER — HOSPITAL ENCOUNTER (EMERGENCY)
Facility: HOSPITAL | Age: 83
Discharge: HOME | End: 2023-02-24
Attending: EMERGENCY MEDICINE
Payer: MEDICARE

## 2023-01-01 ENCOUNTER — HOSPITAL ENCOUNTER (OUTPATIENT)
Dept: RADIOLOGY | Facility: HOSPITAL | Age: 83
Discharge: HOME | End: 2023-07-11
Attending: RADIOLOGY
Payer: MEDICARE

## 2023-01-01 ENCOUNTER — ANESTHESIA (OUTPATIENT)
Dept: SURGERY | Facility: HOSPITAL | Age: 83
DRG: 374 | End: 2023-01-01
Payer: MEDICARE

## 2023-01-01 ENCOUNTER — ANESTHESIA EVENT (OUTPATIENT)
Dept: ENDOSCOPY | Facility: HOSPITAL | Age: 83
DRG: 374 | End: 2023-01-01
Payer: MEDICARE

## 2023-01-01 VITALS
RESPIRATION RATE: 16 BRPM | TEMPERATURE: 98 F | DIASTOLIC BLOOD PRESSURE: 82 MMHG | SYSTOLIC BLOOD PRESSURE: 148 MMHG | OXYGEN SATURATION: 98 % | WEIGHT: 130 LBS | BODY MASS INDEX: 21.66 KG/M2 | HEART RATE: 69 BPM | HEIGHT: 65 IN

## 2023-01-01 VITALS
WEIGHT: 126 LBS | SYSTOLIC BLOOD PRESSURE: 105 MMHG | TEMPERATURE: 98 F | OXYGEN SATURATION: 98 % | RESPIRATION RATE: 18 BRPM | DIASTOLIC BLOOD PRESSURE: 68 MMHG | HEIGHT: 65 IN | HEART RATE: 71 BPM | BODY MASS INDEX: 20.99 KG/M2

## 2023-01-01 VITALS
WEIGHT: 120 LBS | TEMPERATURE: 98 F | HEIGHT: 65 IN | DIASTOLIC BLOOD PRESSURE: 84 MMHG | RESPIRATION RATE: 18 BRPM | SYSTOLIC BLOOD PRESSURE: 133 MMHG | OXYGEN SATURATION: 97 % | BODY MASS INDEX: 19.99 KG/M2 | HEART RATE: 88 BPM

## 2023-01-01 DIAGNOSIS — T18.108A ESOPHAGEAL FOREIGN BODY, INITIAL ENCOUNTER: ICD-10-CM

## 2023-01-01 DIAGNOSIS — R13.10 DYSPHAGIA, UNSPECIFIED TYPE: Primary | ICD-10-CM

## 2023-01-01 DIAGNOSIS — R63.30 FEEDING DIFFICULTIES, UNSPECIFIED: ICD-10-CM

## 2023-01-01 DIAGNOSIS — C34.12 SQUAMOUS CELL CARCINOMA OF BRONCHUS IN LEFT UPPER LOBE: ICD-10-CM
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
## 2023-01-01 DIAGNOSIS — T17.928A ASPIRATION OF FOOD: ICD-10-CM

## 2023-01-01 DIAGNOSIS — C15.3 PRIMARY SQUAMOUS CELL CARCINOMA OF UPPER THIRD OF ESOPHAGUS: Primary | ICD-10-CM

## 2023-01-01 DIAGNOSIS — C34.12 SQUAMOUS CELL CARCINOMA OF BRONCHUS IN LEFT UPPER LOBE: Primary | ICD-10-CM

## 2023-01-01 DIAGNOSIS — R13.11 DYSPHAGIA, ORAL PHASE: ICD-10-CM

## 2023-01-01 DIAGNOSIS — R05.9 COUGH: ICD-10-CM

## 2023-01-01 DIAGNOSIS — R13.11 DYSPHAGIA, ORAL PHASE: Primary | ICD-10-CM

## 2023-01-01 DIAGNOSIS — C15.9 MALIGNANT NEOPLASM OF ESOPHAGUS, UNSPECIFIED LOCATION: ICD-10-CM

## 2023-01-01 DIAGNOSIS — R13.11 ORAL PHASE DYSPHAGIA: Primary | ICD-10-CM

## 2023-01-01 LAB
ABS NEUT (OLG): 2.64 X10(3)/MCL (ref 2.1–9.2)
ALBUMIN SERPL-MCNC: 2.8 G/DL (ref 3.4–4.8)
ALBUMIN SERPL-MCNC: 3.2 G/DL (ref 3.4–4.8)
ALBUMIN SERPL-MCNC: 3.4 G/DL (ref 3.4–4.8)
ALBUMIN/GLOB SERPL: 0.9 RATIO (ref 1.1–2)
ALBUMIN/GLOB SERPL: 1 RATIO (ref 1.1–2)
ALBUMIN/GLOB SERPL: 1.1 RATIO (ref 1.1–2)
ALP SERPL-CCNC: 58 UNIT/L (ref 40–150)
ALP SERPL-CCNC: 72 UNIT/L (ref 40–150)
ALP SERPL-CCNC: 75 UNIT/L (ref 40–150)
ALT SERPL-CCNC: 10 UNIT/L (ref 0–55)
ALT SERPL-CCNC: 7 UNIT/L (ref 0–55)
ALT SERPL-CCNC: 9 UNIT/L (ref 0–55)
ANION GAP SERPL CALC-SCNC: 7 MEQ/L
ANION GAP SERPL CALC-SCNC: 9 MEQ/L
ANISOCYTOSIS BLD QL SMEAR: ABNORMAL
AST SERPL-CCNC: 16 UNIT/L (ref 5–34)
BASOPHILS # BLD AUTO: 0.02 X10(3)/MCL
BASOPHILS # BLD AUTO: 0.03 X10(3)/MCL
BASOPHILS NFR BLD AUTO: 0.4 %
BASOPHILS NFR BLD AUTO: 0.4 %
BASOPHILS NFR BLD AUTO: 0.5 %
BASOPHILS NFR BLD AUTO: 0.7 %
BILIRUBIN DIRECT+TOT PNL SERPL-MCNC: 0.5 MG/DL
BILIRUBIN DIRECT+TOT PNL SERPL-MCNC: 0.6 MG/DL
BILIRUBIN DIRECT+TOT PNL SERPL-MCNC: 0.6 MG/DL
BUN SERPL-MCNC: 13 MG/DL (ref 8.4–25.7)
BUN SERPL-MCNC: 20.8 MG/DL (ref 8.4–25.7)
BUN SERPL-MCNC: 20.8 MG/DL (ref 8.4–25.7)
BUN SERPL-MCNC: 20.9 MG/DL (ref 8.4–25.7)
BUN SERPL-MCNC: 22.6 MG/DL (ref 8.4–25.7)
BURR CELLS (OLG): ABNORMAL
CALCIUM SERPL-MCNC: 8.4 MG/DL (ref 8.8–10)
CALCIUM SERPL-MCNC: 8.5 MG/DL (ref 8.8–10)
CALCIUM SERPL-MCNC: 8.6 MG/DL (ref 8.8–10)
CALCIUM SERPL-MCNC: 8.7 MG/DL (ref 8.8–10)
CALCIUM SERPL-MCNC: 9.3 MG/DL (ref 8.8–10)
CHLORIDE SERPL-SCNC: 105 MMOL/L (ref 98–107)
CHLORIDE SERPL-SCNC: 106 MMOL/L (ref 98–107)
CHLORIDE SERPL-SCNC: 106 MMOL/L (ref 98–107)
CHLORIDE SERPL-SCNC: 107 MMOL/L (ref 98–107)
CHLORIDE SERPL-SCNC: 108 MMOL/L (ref 98–107)
CO2 SERPL-SCNC: 23 MMOL/L (ref 23–31)
CO2 SERPL-SCNC: 23 MMOL/L (ref 23–31)
CO2 SERPL-SCNC: 25 MMOL/L (ref 23–31)
CO2 SERPL-SCNC: 27 MMOL/L (ref 23–31)
CO2 SERPL-SCNC: 27 MMOL/L (ref 23–31)
CREAT SERPL-MCNC: 0.91 MG/DL (ref 0.73–1.18)
CREAT SERPL-MCNC: 1 MG/DL (ref 0.73–1.18)
CREAT SERPL-MCNC: 1.02 MG/DL (ref 0.73–1.18)
CREAT SERPL-MCNC: 1.38 MG/DL (ref 0.73–1.18)
CREAT SERPL-MCNC: 1.4 MG/DL (ref 0.73–1.18)
CREAT/UREA NIT SERPL: 21
CREAT/UREA NIT SERPL: 23
EOSINOPHIL # BLD AUTO: 0.12 X10(3)/MCL (ref 0–0.9)
EOSINOPHIL # BLD AUTO: 0.16 X10(3)/MCL (ref 0–0.9)
EOSINOPHIL # BLD AUTO: 0.19 X10(3)/MCL (ref 0–0.9)
EOSINOPHIL # BLD AUTO: 0.24 X10(3)/MCL (ref 0–0.9)
EOSINOPHIL NFR BLD AUTO: 2.5 %
EOSINOPHIL NFR BLD AUTO: 4.1 %
EOSINOPHIL NFR BLD AUTO: 4.1 %
EOSINOPHIL NFR BLD AUTO: 5.7 %
EOSINOPHIL NFR BLD MANUAL: 0.08 X10(3)/MCL (ref 0–0.9)
EOSINOPHIL NFR BLD MANUAL: 2 %
ERYTHROCYTE [DISTWIDTH] IN BLOOD BY AUTOMATED COUNT: 14.1 % (ref 11.5–17)
ERYTHROCYTE [DISTWIDTH] IN BLOOD BY AUTOMATED COUNT: 14.2 % (ref 11.5–17)
ERYTHROCYTE [DISTWIDTH] IN BLOOD BY AUTOMATED COUNT: 14.4 % (ref 11.5–17)
ERYTHROCYTE [DISTWIDTH] IN BLOOD BY AUTOMATED COUNT: 14.6 % (ref 11.5–17)
ERYTHROCYTE [DISTWIDTH] IN BLOOD BY AUTOMATED COUNT: 15.4 % (ref 11.5–17)
GFR SERPLBLD CREATININE-BSD FMLA CKD-EPI: 50 MLS/MIN/1.73/M2
GFR SERPLBLD CREATININE-BSD FMLA CKD-EPI: 51 MLS/MIN/1.73/M2
GFR SERPLBLD CREATININE-BSD FMLA CKD-EPI: >60 MLS/MIN/1.73/M2
GLOBULIN SER-MCNC: 2.8 GM/DL (ref 2.4–3.5)
GLOBULIN SER-MCNC: 3 GM/DL (ref 2.4–3.5)
GLOBULIN SER-MCNC: 3.4 GM/DL (ref 2.4–3.5)
GLUCOSE SERPL-MCNC: 105 MG/DL (ref 82–115)
GLUCOSE SERPL-MCNC: 108 MG/DL (ref 82–115)
GLUCOSE SERPL-MCNC: 87 MG/DL (ref 82–115)
GLUCOSE SERPL-MCNC: 89 MG/DL (ref 82–115)
GLUCOSE SERPL-MCNC: 96 MG/DL (ref 82–115)
HCT VFR BLD AUTO: 37.2 % (ref 42–52)
HCT VFR BLD AUTO: 37.3 % (ref 42–52)
HCT VFR BLD AUTO: 39.1 % (ref 42–52)
HCT VFR BLD AUTO: 40.7 % (ref 42–52)
HCT VFR BLD AUTO: 43.7 % (ref 42–52)
HGB BLD-MCNC: 11.6 G/DL (ref 14–18)
HGB BLD-MCNC: 11.8 G/DL (ref 14–18)
HGB BLD-MCNC: 12.3 G/DL (ref 14–18)
HGB BLD-MCNC: 12.8 G/DL (ref 14–18)
HGB BLD-MCNC: 13.4 G/DL (ref 14–18)
IMM GRANULOCYTES # BLD AUTO: 0.01 X10(3)/MCL (ref 0–0.04)
IMM GRANULOCYTES # BLD AUTO: 0.02 X10(3)/MCL (ref 0–0.04)
IMM GRANULOCYTES # BLD AUTO: 0.03 X10(3)/MCL (ref 0–0.04)
IMM GRANULOCYTES NFR BLD AUTO: 0.2 %
IMM GRANULOCYTES NFR BLD AUTO: 0.3 %
IMM GRANULOCYTES NFR BLD AUTO: 0.3 %
IMM GRANULOCYTES NFR BLD AUTO: 0.5 %
IMM GRANULOCYTES NFR BLD AUTO: 0.6 %
INSTRUMENT WBC (OLG): 4 X10(3)/MCL
LYMPHOCYTES # BLD AUTO: 0.61 X10(3)/MCL (ref 0.6–4.6)
LYMPHOCYTES # BLD AUTO: 0.62 X10(3)/MCL (ref 0.6–4.6)
LYMPHOCYTES # BLD AUTO: 0.73 X10(3)/MCL (ref 0.6–4.6)
LYMPHOCYTES # BLD AUTO: 0.81 X10(3)/MCL (ref 0.6–4.6)
LYMPHOCYTES NFR BLD AUTO: 12.8 %
LYMPHOCYTES NFR BLD AUTO: 15.7 %
LYMPHOCYTES NFR BLD AUTO: 15.7 %
LYMPHOCYTES NFR BLD AUTO: 19.3 %
LYMPHOCYTES NFR BLD MANUAL: 0.88 X10(3)/MCL
LYMPHOCYTES NFR BLD MANUAL: 22 %
MAGNESIUM SERPL-MCNC: 2.3 MG/DL (ref 1.6–2.6)
MCH RBC QN AUTO: 23.3 PG (ref 27–31)
MCH RBC QN AUTO: 23.3 PG (ref 27–31)
MCH RBC QN AUTO: 23.4 PG (ref 27–31)
MCH RBC QN AUTO: 23.4 PG (ref 27–31)
MCH RBC QN AUTO: 23.5 PG
MCHC RBC AUTO-ENTMCNC: 30.7 G/DL (ref 33–36)
MCHC RBC AUTO-ENTMCNC: 31.1 G/DL (ref 33–36)
MCHC RBC AUTO-ENTMCNC: 31.4 G/DL (ref 33–36)
MCHC RBC AUTO-ENTMCNC: 31.5 G/DL (ref 33–36)
MCHC RBC AUTO-ENTMCNC: 31.7 G/DL (ref 33–36)
MCV RBC AUTO: 73.7 FL (ref 80–94)
MCV RBC AUTO: 74.1 FL (ref 80–94)
MCV RBC AUTO: 74.5 FL (ref 80–94)
MCV RBC AUTO: 74.9 FL (ref 80–94)
MCV RBC AUTO: 76.5 FL (ref 80–94)
MONOCYTES # BLD AUTO: 0.6 X10(3)/MCL (ref 0.1–1.3)
MONOCYTES # BLD AUTO: 0.72 X10(3)/MCL (ref 0.1–1.3)
MONOCYTES # BLD AUTO: 0.86 X10(3)/MCL (ref 0.1–1.3)
MONOCYTES # BLD AUTO: 1 X10(3)/MCL (ref 0.1–1.3)
MONOCYTES NFR BLD AUTO: 12.6 %
MONOCYTES NFR BLD AUTO: 18.3 %
MONOCYTES NFR BLD AUTO: 18.5 %
MONOCYTES NFR BLD AUTO: 23.8 %
MONOCYTES NFR BLD MANUAL: 0.4 X10(3)/MCL (ref 0.1–1.3)
MONOCYTES NFR BLD MANUAL: 10 %
NEUTROPHILS # BLD AUTO: 2.1 X10(3)/MCL (ref 2.1–9.2)
NEUTROPHILS # BLD AUTO: 2.41 X10(3)/MCL (ref 2.1–9.2)
NEUTROPHILS # BLD AUTO: 2.83 X10(3)/MCL (ref 2.1–9.2)
NEUTROPHILS # BLD AUTO: 3.38 X10(3)/MCL (ref 2.1–9.2)
NEUTROPHILS NFR BLD AUTO: 50 %
NEUTROPHILS NFR BLD AUTO: 61.1 %
NEUTROPHILS NFR BLD AUTO: 61.1 %
NEUTROPHILS NFR BLD AUTO: 71.1 %
NEUTROPHILS NFR BLD MANUAL: 66 %
NRBC BLD AUTO-RTO: 0 %
OVALOCYTES (OLG): ABNORMAL
PHOSPHATE SERPL-MCNC: 3.1 MG/DL (ref 2.3–4.7)
PLATELET # BLD AUTO: 197 X10(3)/MCL (ref 130–400)
PLATELET # BLD AUTO: 219 X10(3)/MCL (ref 130–400)
PLATELET # BLD AUTO: 221 X10(3)/MCL (ref 130–400)
PLATELET # BLD AUTO: 230 X10(3)/MCL (ref 130–400)
PLATELET # BLD AUTO: 241 X10(3)/MCL (ref 130–400)
PLATELET # BLD EST: NORMAL 10*3/UL
PMV BLD AUTO: 10 FL (ref 7.4–10.4)
PMV BLD AUTO: 10.2 FL (ref 7.4–10.4)
PMV BLD AUTO: 10.4 FL (ref 7.4–10.4)
PMV BLD AUTO: 10.5 FL (ref 7.4–10.4)
PMV BLD AUTO: 10.7 FL (ref 7.4–10.4)
POCT GLUCOSE: 102 MG/DL (ref 70–110)
POIKILOCYTOSIS BLD QL SMEAR: ABNORMAL
POTASSIUM SERPL-SCNC: 3.7 MMOL/L (ref 3.5–5.1)
POTASSIUM SERPL-SCNC: 4.2 MMOL/L (ref 3.5–5.1)
POTASSIUM SERPL-SCNC: 4.2 MMOL/L (ref 3.5–5.1)
POTASSIUM SERPL-SCNC: 4.3 MMOL/L (ref 3.5–5.1)
POTASSIUM SERPL-SCNC: 4.9 MMOL/L (ref 3.5–5.1)
PROT SERPL-MCNC: 5.6 GM/DL (ref 5.8–7.6)
PROT SERPL-MCNC: 6.4 GM/DL (ref 5.8–7.6)
PROT SERPL-MCNC: 6.6 GM/DL (ref 5.8–7.6)
PSYCHE PATHOLOGY RESULT: NORMAL
RBC # BLD AUTO: 4.98 X10(6)/MCL (ref 4.7–6.1)
RBC # BLD AUTO: 5.05 X10(6)/MCL (ref 4.7–6.1)
RBC # BLD AUTO: 5.28 X10(6)/MCL (ref 4.7–6.1)
RBC # BLD AUTO: 5.46 X10(6)/MCL (ref 4.7–6.1)
RBC # BLD AUTO: 5.71 X10(6)/MCL (ref 4.7–6.1)
RBC MORPH BLD: ABNORMAL
SODIUM SERPL-SCNC: 136 MMOL/L (ref 136–145)
SODIUM SERPL-SCNC: 137 MMOL/L (ref 136–145)
SODIUM SERPL-SCNC: 138 MMOL/L (ref 136–145)
SODIUM SERPL-SCNC: 139 MMOL/L (ref 136–145)
SODIUM SERPL-SCNC: 142 MMOL/L (ref 136–145)
WBC # SPEC AUTO: 3.94 X10(3)/MCL (ref 4.5–11.5)
WBC # SPEC AUTO: 4 X10(3)/MCL (ref 4.5–11.5)
WBC # SPEC AUTO: 4.2 X10(3)/MCL (ref 4.5–11.5)
WBC # SPEC AUTO: 4.64 X10(3)/MCL (ref 4.5–11.5)
WBC # SPEC AUTO: 4.76 X10(3)/MCL (ref 4.5–11.5)

## 2023-01-01 PROCEDURE — 25000003 PHARM REV CODE 250: Performed by: HOSPITALIST

## 2023-01-01 PROCEDURE — 99285 EMERGENCY DEPT VISIT HI MDM: CPT | Mod: 25

## 2023-01-01 PROCEDURE — 77300 RADIATION THERAPY DOSE PLAN: CPT | Performed by: RADIOLOGY

## 2023-01-01 PROCEDURE — 92611 MOTION FLUOROSCOPY/SWALLOW: CPT

## 2023-01-01 PROCEDURE — 92610 EVALUATE SWALLOWING FUNCTION: CPT

## 2023-01-01 PROCEDURE — G0378 HOSPITAL OBSERVATION PER HR: HCPCS

## 2023-01-01 PROCEDURE — 63600175 PHARM REV CODE 636 W HCPCS: Performed by: HOSPITALIST

## 2023-01-01 PROCEDURE — 77334 RADIATION TREATMENT AID(S): CPT | Performed by: RADIOLOGY

## 2023-01-01 PROCEDURE — 63600175 PHARM REV CODE 636 W HCPCS

## 2023-01-01 PROCEDURE — 85025 COMPLETE CBC W/AUTO DIFF WBC: CPT | Performed by: NURSE PRACTITIONER

## 2023-01-01 PROCEDURE — A9698 NON-RAD CONTRAST MATERIALNOC: HCPCS

## 2023-01-01 PROCEDURE — D9220A PRA ANESTHESIA: Mod: CRNA,,, | Performed by: NURSE ANESTHETIST, CERTIFIED REGISTERED

## 2023-01-01 PROCEDURE — 99223 1ST HOSP IP/OBS HIGH 75: CPT | Mod: ,,, | Performed by: INTERNAL MEDICINE

## 2023-01-01 PROCEDURE — 96360 HYDRATION IV INFUSION INIT: CPT

## 2023-01-01 PROCEDURE — 85025 COMPLETE CBC W/AUTO DIFF WBC: CPT

## 2023-01-01 PROCEDURE — 77412 RADIATION TX DELIVERY LVL 3: CPT | Performed by: RADIOLOGY

## 2023-01-01 PROCEDURE — 25000003 PHARM REV CODE 250: Performed by: INTERNAL MEDICINE

## 2023-01-01 PROCEDURE — 63600175 PHARM REV CODE 636 W HCPCS: Performed by: NURSE PRACTITIONER

## 2023-01-01 PROCEDURE — D9220A PRA ANESTHESIA: Mod: ANES,,, | Performed by: ANESTHESIOLOGY

## 2023-01-01 PROCEDURE — 96366 THER/PROPH/DIAG IV INF ADDON: CPT

## 2023-01-01 PROCEDURE — 36000710: Performed by: STUDENT IN AN ORGANIZED HEALTH CARE EDUCATION/TRAINING PROGRAM

## 2023-01-01 PROCEDURE — 36000711: Performed by: STUDENT IN AN ORGANIZED HEALTH CARE EDUCATION/TRAINING PROGRAM

## 2023-01-01 PROCEDURE — 94761 N-INVAS EAR/PLS OXIMETRY MLT: CPT

## 2023-01-01 PROCEDURE — 84100 ASSAY OF PHOSPHORUS: CPT | Performed by: HOSPITALIST

## 2023-01-01 PROCEDURE — 11000001 HC ACUTE MED/SURG PRIVATE ROOM

## 2023-01-01 PROCEDURE — 63600175 PHARM REV CODE 636 W HCPCS: Performed by: STUDENT IN AN ORGANIZED HEALTH CARE EDUCATION/TRAINING PROGRAM

## 2023-01-01 PROCEDURE — 37000008 HC ANESTHESIA 1ST 15 MINUTES: Performed by: INTERNAL MEDICINE

## 2023-01-01 PROCEDURE — 25000003 PHARM REV CODE 250: Performed by: NURSE ANESTHETIST, CERTIFIED REGISTERED

## 2023-01-01 PROCEDURE — 97162 PT EVAL MOD COMPLEX 30 MIN: CPT

## 2023-01-01 PROCEDURE — 77290 THER RAD SIMULAJ FIELD CPLX: CPT | Performed by: RADIOLOGY

## 2023-01-01 PROCEDURE — 37000008 HC ANESTHESIA 1ST 15 MINUTES: Performed by: STUDENT IN AN ORGANIZED HEALTH CARE EDUCATION/TRAINING PROGRAM

## 2023-01-01 PROCEDURE — 25500020 PHARM REV CODE 255: Performed by: EMERGENCY MEDICINE

## 2023-01-01 PROCEDURE — 96365 THER/PROPH/DIAG IV INF INIT: CPT

## 2023-01-01 PROCEDURE — 25500020 PHARM REV CODE 255: Performed by: HOSPITALIST

## 2023-01-01 PROCEDURE — 88341 IMHCHEM/IMCYTCHM EA ADD ANTB: CPT

## 2023-01-01 PROCEDURE — 25000003 PHARM REV CODE 250: Performed by: NURSE PRACTITIONER

## 2023-01-01 PROCEDURE — 37000009 HC ANESTHESIA EA ADD 15 MINS: Performed by: STUDENT IN AN ORGANIZED HEALTH CARE EDUCATION/TRAINING PROGRAM

## 2023-01-01 PROCEDURE — A9698 NON-RAD CONTRAST MATERIALNOC: HCPCS | Performed by: HOSPITALIST

## 2023-01-01 PROCEDURE — 99223 PR INITIAL HOSPITAL CARE,LEVL III: ICD-10-PCS | Mod: ,,, | Performed by: INTERNAL MEDICINE

## 2023-01-01 PROCEDURE — 96361 HYDRATE IV INFUSION ADD-ON: CPT

## 2023-01-01 PROCEDURE — 27201423 OPTIME MED/SURG SUP & DEVICES STERILE SUPPLY: Performed by: INTERNAL MEDICINE

## 2023-01-01 PROCEDURE — 96368 THER/DIAG CONCURRENT INF: CPT

## 2023-01-01 PROCEDURE — 37000009 HC ANESTHESIA EA ADD 15 MINS: Performed by: INTERNAL MEDICINE

## 2023-01-01 PROCEDURE — 27201423 OPTIME MED/SURG SUP & DEVICES STERILE SUPPLY: Performed by: STUDENT IN AN ORGANIZED HEALTH CARE EDUCATION/TRAINING PROGRAM

## 2023-01-01 PROCEDURE — 80053 COMPREHEN METABOLIC PANEL: CPT | Performed by: NURSE PRACTITIONER

## 2023-01-01 PROCEDURE — 27000221 HC OXYGEN, UP TO 24 HOURS

## 2023-01-01 PROCEDURE — 71000033 HC RECOVERY, INTIAL HOUR: Performed by: STUDENT IN AN ORGANIZED HEALTH CARE EDUCATION/TRAINING PROGRAM

## 2023-01-01 PROCEDURE — A9552 F18 FDG: HCPCS

## 2023-01-01 PROCEDURE — 80053 COMPREHEN METABOLIC PANEL: CPT

## 2023-01-01 PROCEDURE — 74230 X-RAY XM SWLNG FUNCJ C+: CPT | Mod: TC

## 2023-01-01 PROCEDURE — 85025 COMPLETE CBC W/AUTO DIFF WBC: CPT | Performed by: HOSPITALIST

## 2023-01-01 PROCEDURE — 43239 EGD BIOPSY SINGLE/MULTIPLE: CPT | Mod: 59 | Performed by: INTERNAL MEDICINE

## 2023-01-01 PROCEDURE — 85027 COMPLETE CBC AUTOMATED: CPT | Performed by: NURSE PRACTITIONER

## 2023-01-01 PROCEDURE — C1729 CATH, DRAINAGE: HCPCS | Performed by: STUDENT IN AN ORGANIZED HEALTH CARE EDUCATION/TRAINING PROGRAM

## 2023-01-01 PROCEDURE — 25500020 PHARM REV CODE 255

## 2023-01-01 PROCEDURE — 80048 BASIC METABOLIC PNL TOTAL CA: CPT | Performed by: HOSPITALIST

## 2023-01-01 PROCEDURE — 63600175 PHARM REV CODE 636 W HCPCS: Performed by: NURSE ANESTHETIST, CERTIFIED REGISTERED

## 2023-01-01 PROCEDURE — 83735 ASSAY OF MAGNESIUM: CPT | Performed by: HOSPITALIST

## 2023-01-01 PROCEDURE — 77295 3-D RADIOTHERAPY PLAN: CPT | Performed by: RADIOLOGY

## 2023-01-01 PROCEDURE — D9220A PRA ANESTHESIA: ICD-10-PCS | Mod: ANES,,, | Performed by: ANESTHESIOLOGY

## 2023-01-01 PROCEDURE — 43249 ESOPH EGD DILATION <30 MM: CPT | Performed by: INTERNAL MEDICINE

## 2023-01-01 PROCEDURE — D9220A PRA ANESTHESIA: ICD-10-PCS | Mod: CRNA,,, | Performed by: NURSE ANESTHETIST, CERTIFIED REGISTERED

## 2023-01-01 PROCEDURE — 77280 THER RAD SIMULAJ FIELD SMPL: CPT | Performed by: RADIOLOGY

## 2023-01-01 PROCEDURE — 96375 TX/PRO/DX INJ NEW DRUG ADDON: CPT

## 2023-01-01 PROCEDURE — 77336 RADIATION PHYSICS CONSULT: CPT | Performed by: RADIOLOGY

## 2023-01-01 PROCEDURE — 97165 OT EVAL LOW COMPLEX 30 MIN: CPT

## 2023-01-01 PROCEDURE — 96372 THER/PROPH/DIAG INJ SC/IM: CPT

## 2023-01-01 PROCEDURE — 88342 IMHCHEM/IMCYTCHM 1ST ANTB: CPT

## 2023-01-01 PROCEDURE — 88305 TISSUE EXAM BY PATHOLOGIST: CPT | Performed by: INTERNAL MEDICINE

## 2023-01-01 RX ORDER — TALC
6 POWDER (GRAM) TOPICAL NIGHTLY PRN
Status: DISCONTINUED | OUTPATIENT
Start: 2023-01-01 | End: 2023-01-01 | Stop reason: HOSPADM

## 2023-01-01 RX ORDER — CEFAZOLIN SODIUM 2 G/50ML
2 SOLUTION INTRAVENOUS ONCE
Status: CANCELLED | OUTPATIENT
Start: 2023-01-01

## 2023-01-01 RX ORDER — LIDOCAINE HYDROCHLORIDE 20 MG/ML
INJECTION, SOLUTION EPIDURAL; INFILTRATION; INTRACAUDAL; PERINEURAL
Status: DISPENSED
Start: 2023-01-01 | End: 2023-01-01

## 2023-01-01 RX ORDER — PHENYLEPHRINE HCL IN 0.9% NACL 1 MG/10 ML
SYRINGE (ML) INTRAVENOUS
Status: DISPENSED
Start: 2023-01-01 | End: 2023-01-01

## 2023-01-01 RX ORDER — ALBUTEROL SULFATE 90 UG/1
2 AEROSOL, METERED RESPIRATORY (INHALATION) EVERY 6 HOURS PRN
Status: DISCONTINUED | OUTPATIENT
Start: 2023-01-01 | End: 2023-01-01 | Stop reason: HOSPADM

## 2023-01-01 RX ORDER — FENTANYL CITRATE 50 UG/ML
INJECTION, SOLUTION INTRAMUSCULAR; INTRAVENOUS
Status: DISCONTINUED | OUTPATIENT
Start: 2023-01-01 | End: 2023-01-01

## 2023-01-01 RX ORDER — LEVETIRACETAM 100 MG/ML
500 SOLUTION ORAL 2 TIMES DAILY
Status: DISCONTINUED | OUTPATIENT
Start: 2023-01-01 | End: 2023-01-01 | Stop reason: HOSPADM

## 2023-01-01 RX ORDER — FAMOTIDINE 10 MG/ML
20 INJECTION INTRAVENOUS DAILY
Status: DISCONTINUED | OUTPATIENT
Start: 2023-01-01 | End: 2023-01-01

## 2023-01-01 RX ORDER — PROPOFOL 10 MG/ML
INJECTION, EMULSION INTRAVENOUS CONTINUOUS PRN
Status: DISCONTINUED | OUTPATIENT
Start: 2023-01-01 | End: 2023-01-01

## 2023-01-01 RX ORDER — HEPARIN SODIUM 5000 [USP'U]/ML
5000 INJECTION, SOLUTION INTRAVENOUS; SUBCUTANEOUS EVERY 12 HOURS
Status: DISCONTINUED | OUTPATIENT
Start: 2023-01-01 | End: 2023-01-01 | Stop reason: HOSPADM

## 2023-01-01 RX ORDER — SODIUM CHLORIDE 9 MG/ML
1000 INJECTION, SOLUTION INTRAVENOUS
Status: COMPLETED | OUTPATIENT
Start: 2023-01-01 | End: 2023-01-01

## 2023-01-01 RX ORDER — ACETAMINOPHEN 10 MG/ML
INJECTION, SOLUTION INTRAVENOUS
Status: DISCONTINUED | OUTPATIENT
Start: 2023-01-01 | End: 2023-01-01

## 2023-01-01 RX ORDER — BUPIVACAINE HYDROCHLORIDE 2.5 MG/ML
INJECTION, SOLUTION EPIDURAL; INFILTRATION; INTRACAUDAL
Status: DISCONTINUED | OUTPATIENT
Start: 2023-01-01 | End: 2023-01-01 | Stop reason: HOSPADM

## 2023-01-01 RX ORDER — SODIUM CHLORIDE 0.9 % (FLUSH) 0.9 %
10 SYRINGE (ML) INJECTION
Status: DISCONTINUED | OUTPATIENT
Start: 2023-01-01 | End: 2023-01-01 | Stop reason: HOSPADM

## 2023-01-01 RX ORDER — LABETALOL HYDROCHLORIDE 5 MG/ML
10 INJECTION, SOLUTION INTRAVENOUS EVERY 4 HOURS PRN
Status: DISCONTINUED | OUTPATIENT
Start: 2023-01-01 | End: 2023-01-01 | Stop reason: HOSPADM

## 2023-01-01 RX ORDER — PROPOFOL 10 MG/ML
VIAL (ML) INTRAVENOUS
Status: DISCONTINUED | OUTPATIENT
Start: 2023-01-01 | End: 2023-01-01

## 2023-01-01 RX ORDER — ROCURONIUM BROMIDE 10 MG/ML
INJECTION, SOLUTION INTRAVENOUS
Status: DISCONTINUED | OUTPATIENT
Start: 2023-01-01 | End: 2023-01-01

## 2023-01-01 RX ORDER — LABETALOL HCL 20 MG/4 ML
10 SYRINGE (ML) INTRAVENOUS
Status: DISCONTINUED | OUTPATIENT
Start: 2023-01-01 | End: 2023-01-01 | Stop reason: HOSPADM

## 2023-01-01 RX ORDER — DEXAMETHASONE SODIUM PHOSPHATE 4 MG/ML
INJECTION, SOLUTION INTRA-ARTICULAR; INTRALESIONAL; INTRAMUSCULAR; INTRAVENOUS; SOFT TISSUE
Status: DISCONTINUED | OUTPATIENT
Start: 2023-01-01 | End: 2023-01-01

## 2023-01-01 RX ORDER — HYDRALAZINE HYDROCHLORIDE 20 MG/ML
10 INJECTION INTRAMUSCULAR; INTRAVENOUS EVERY 4 HOURS PRN
Status: DISCONTINUED | OUTPATIENT
Start: 2023-01-01 | End: 2023-01-01 | Stop reason: HOSPADM

## 2023-01-01 RX ORDER — PHENYLEPHRINE HCL IN 0.9% NACL 1 MG/10 ML
SYRINGE (ML) INTRAVENOUS
Status: DISCONTINUED | OUTPATIENT
Start: 2023-01-01 | End: 2023-01-01

## 2023-01-01 RX ORDER — SODIUM CHLORIDE, SODIUM GLUCONATE, SODIUM ACETATE, POTASSIUM CHLORIDE AND MAGNESIUM CHLORIDE 30; 37; 368; 526; 502 MG/100ML; MG/100ML; MG/100ML; MG/100ML; MG/100ML
INJECTION, SOLUTION INTRAVENOUS CONTINUOUS
Status: DISCONTINUED | OUTPATIENT
Start: 2023-01-01 | End: 2023-01-01

## 2023-01-01 RX ORDER — ACETAMINOPHEN 10 MG/ML
1000 INJECTION, SOLUTION INTRAVENOUS EVERY 8 HOURS
Status: COMPLETED | OUTPATIENT
Start: 2023-01-01 | End: 2023-01-01

## 2023-01-01 RX ORDER — ONDANSETRON 2 MG/ML
INJECTION INTRAMUSCULAR; INTRAVENOUS
Status: DISCONTINUED | OUTPATIENT
Start: 2023-01-01 | End: 2023-01-01

## 2023-01-01 RX ORDER — CEFAZOLIN SODIUM 2 G/50ML
2 SOLUTION INTRAVENOUS ONCE
Status: COMPLETED | OUTPATIENT
Start: 2023-01-01 | End: 2023-01-01

## 2023-01-01 RX ORDER — PHENYLEPHRINE HYDROCHLORIDE 10 MG/ML
INJECTION INTRAVENOUS
Status: DISCONTINUED | OUTPATIENT
Start: 2023-01-01 | End: 2023-01-01

## 2023-01-01 RX ORDER — MIDAZOLAM HYDROCHLORIDE 1 MG/ML
2 INJECTION INTRAMUSCULAR; INTRAVENOUS ONCE AS NEEDED
Status: CANCELLED | OUTPATIENT
Start: 2023-01-01 | End: 2034-12-22

## 2023-01-01 RX ORDER — LIDOCAINE HYDROCHLORIDE 20 MG/ML
INJECTION, SOLUTION EPIDURAL; INFILTRATION; INTRACAUDAL; PERINEURAL
Status: DISCONTINUED | OUTPATIENT
Start: 2023-01-01 | End: 2023-01-01

## 2023-01-01 RX ORDER — PROPOFOL 10 MG/ML
VIAL (ML) INTRAVENOUS
Status: DISPENSED
Start: 2023-01-01 | End: 2023-01-01

## 2023-01-01 RX ORDER — LIDOCAINE HYDROCHLORIDE 20 MG/ML
INJECTION INTRAVENOUS
Status: DISCONTINUED | OUTPATIENT
Start: 2023-01-01 | End: 2023-01-01

## 2023-01-01 RX ADMIN — ACETAMINOPHEN 1000 MG: 10 INJECTION, SOLUTION INTRAVENOUS at 01:07

## 2023-01-01 RX ADMIN — LEVETIRACETAM 500 MG: 100 INJECTION, SOLUTION INTRAVENOUS at 09:07

## 2023-01-01 RX ADMIN — FENTANYL CITRATE 100 MCG: 50 INJECTION, SOLUTION INTRAMUSCULAR; INTRAVENOUS at 12:07

## 2023-01-01 RX ADMIN — SODIUM CHLORIDE, SODIUM GLUCONATE, SODIUM ACETATE, POTASSIUM CHLORIDE AND MAGNESIUM CHLORIDE: 526; 502; 368; 37; 30 INJECTION, SOLUTION INTRAVENOUS at 12:07

## 2023-01-01 RX ADMIN — IOPAMIDOL 80 ML: 755 INJECTION, SOLUTION INTRAVENOUS at 08:02

## 2023-01-01 RX ADMIN — LEUCINE, PHENYLALANINE, LYSINE, METHIONINE, ISOLEUCINE, VALINE, HISTIDINE, THREONINE, TRYPTOPHAN, ALANINE, GLYCINE, ARGININE, PROLINE, SERINE, TYROSINE, SODIUM ACETATE, DIBASIC POTASSIUM PHOSPHATE, MAGNESIUM CHLORIDE, SODIUM CHLORIDE, CALCIUM CHLORIDE, DEXTROSE
311; 238; 247; 170; 255; 247; 204; 179; 77; 880; 438; 489; 289; 213; 17; 297; 261; 51; 77; 33; 5 INJECTION INTRAVENOUS at 10:07

## 2023-01-01 RX ADMIN — LABETALOL HYDROCHLORIDE 10 MG: 5 INJECTION, SOLUTION INTRAVENOUS at 02:07

## 2023-01-01 RX ADMIN — Medication 100 MCG: at 01:07

## 2023-01-01 RX ADMIN — LEUCINE, PHENYLALANINE, LYSINE, METHIONINE, ISOLEUCINE, VALINE, HISTIDINE, THREONINE, TRYPTOPHAN, ALANINE, GLYCINE, ARGININE, PROLINE, SERINE, TYROSINE, SODIUM ACETATE, DIBASIC POTASSIUM PHOSPHATE, MAGNESIUM CHLORIDE, SODIUM CHLORIDE, CALCIUM CHLORIDE, DEXTROSE
311; 238; 247; 170; 255; 247; 204; 179; 77; 880; 438; 489; 289; 213; 17; 297; 261; 51; 77; 33; 5 INJECTION INTRAVENOUS at 03:07

## 2023-01-01 RX ADMIN — LEVETIRACETAM 500 MG: 100 INJECTION, SOLUTION INTRAVENOUS at 08:07

## 2023-01-01 RX ADMIN — LIDOCAINE HYDROCHLORIDE 4 ML: 20 INJECTION, SOLUTION EPIDURAL; INFILTRATION; INTRACAUDAL; PERINEURAL at 12:07

## 2023-01-01 RX ADMIN — LEUCINE, PHENYLALANINE, LYSINE, METHIONINE, ISOLEUCINE, VALINE, HISTIDINE, THREONINE, TRYPTOPHAN, ALANINE, GLYCINE, ARGININE, PROLINE, SERINE, TYROSINE, SODIUM ACETATE, DIBASIC POTASSIUM PHOSPHATE, MAGNESIUM CHLORIDE, SODIUM CHLORIDE, CALCIUM CHLORIDE, DEXTROSE
311; 238; 247; 170; 255; 247; 204; 179; 77; 880; 438; 489; 289; 213; 17; 297; 261; 51; 77; 33; 5 INJECTION INTRAVENOUS at 11:07

## 2023-01-01 RX ADMIN — FAMOTIDINE 20 MG: 10 INJECTION, SOLUTION INTRAVENOUS at 09:07

## 2023-01-01 RX ADMIN — PHENYLEPHRINE HYDROCHLORIDE 100 MCG: 10 INJECTION INTRAVENOUS at 12:07

## 2023-01-01 RX ADMIN — PROPOFOL 80 MG: 10 INJECTION, EMULSION INTRAVENOUS at 12:07

## 2023-01-01 RX ADMIN — PHENYLEPHRINE HYDROCHLORIDE 50 MCG: 10 INJECTION INTRAVENOUS at 12:07

## 2023-01-01 RX ADMIN — LEUCINE, PHENYLALANINE, LYSINE, METHIONINE, ISOLEUCINE, VALINE, HISTIDINE, THREONINE, TRYPTOPHAN, ALANINE, GLYCINE, ARGININE, PROLINE, SERINE, TYROSINE, SODIUM ACETATE, DIBASIC POTASSIUM PHOSPHATE, MAGNESIUM CHLORIDE, SODIUM CHLORIDE, CALCIUM CHLORIDE, DEXTROSE
311; 238; 247; 170; 255; 247; 204; 179; 77; 880; 438; 489; 289; 213; 17; 297; 261; 51; 77; 33; 5 INJECTION INTRAVENOUS at 06:07

## 2023-01-01 RX ADMIN — LEUCINE, PHENYLALANINE, LYSINE, METHIONINE, ISOLEUCINE, VALINE, HISTIDINE, THREONINE, TRYPTOPHAN, ALANINE, GLYCINE, ARGININE, PROLINE, SERINE, TYROSINE, SODIUM ACETATE, DIBASIC POTASSIUM PHOSPHATE, MAGNESIUM CHLORIDE, SODIUM CHLORIDE, CALCIUM CHLORIDE, DEXTROSE
311; 238; 247; 170; 255; 247; 204; 179; 77; 880; 438; 489; 289; 213; 17; 297; 261; 51; 77; 33; 5 INJECTION INTRAVENOUS at 01:07

## 2023-01-01 RX ADMIN — DEXAMETHASONE SODIUM PHOSPHATE 4 MG: 4 INJECTION, SOLUTION INTRA-ARTICULAR; INTRALESIONAL; INTRAMUSCULAR; INTRAVENOUS; SOFT TISSUE at 01:07

## 2023-01-01 RX ADMIN — DEXTROSE MONOHYDRATE 2 G: 50 INJECTION, SOLUTION INTRAVENOUS at 12:07

## 2023-01-01 RX ADMIN — ACETAMINOPHEN 1000 MG: 10 INJECTION, SOLUTION INTRAVENOUS at 10:07

## 2023-01-01 RX ADMIN — LEUCINE, PHENYLALANINE, LYSINE, METHIONINE, ISOLEUCINE, VALINE, HISTIDINE, THREONINE, TRYPTOPHAN, ALANINE, GLYCINE, ARGININE, PROLINE, SERINE, TYROSINE, SODIUM ACETATE, DIBASIC POTASSIUM PHOSPHATE, MAGNESIUM CHLORIDE, SODIUM CHLORIDE, CALCIUM CHLORIDE, DEXTROSE
311; 238; 247; 170; 255; 247; 204; 179; 77; 880; 438; 489; 289; 213; 17; 297; 261; 51; 77; 33; 5 INJECTION INTRAVENOUS at 05:07

## 2023-01-01 RX ADMIN — CEFAZOLIN SODIUM 2 G: 2 SOLUTION INTRAVENOUS at 12:07

## 2023-01-01 RX ADMIN — ROCURONIUM BROMIDE 40 MG: 10 SOLUTION INTRAVENOUS at 12:07

## 2023-01-01 RX ADMIN — PROPOFOL 30 MG: 10 INJECTION, EMULSION INTRAVENOUS at 12:07

## 2023-01-01 RX ADMIN — BARIUM SULFATE 10 ML: 0.81 POWDER, FOR SUSPENSION ORAL at 08:07

## 2023-01-01 RX ADMIN — SUGAMMADEX 200 MG: 100 INJECTION, SOLUTION INTRAVENOUS at 01:07

## 2023-01-01 RX ADMIN — BARIUM SULFATE 10 ML: 0.81 POWDER, FOR SUSPENSION ORAL at 10:07

## 2023-01-01 RX ADMIN — LEVETIRACETAM 500 MG: 100 INJECTION, SOLUTION INTRAVENOUS at 10:07

## 2023-01-01 RX ADMIN — FAMOTIDINE 20 MG: 10 INJECTION, SOLUTION INTRAVENOUS at 08:07

## 2023-01-01 RX ADMIN — PHENYLEPHRINE HYDROCHLORIDE 200 MCG: 10 INJECTION INTRAVENOUS at 12:07

## 2023-01-01 RX ADMIN — PROPOFOL 20 MG: 10 INJECTION, EMULSION INTRAVENOUS at 01:07

## 2023-01-01 RX ADMIN — LEUCINE, PHENYLALANINE, LYSINE, METHIONINE, ISOLEUCINE, VALINE, HISTIDINE, THREONINE, TRYPTOPHAN, ALANINE, GLYCINE, ARGININE, PROLINE, SERINE, TYROSINE, SODIUM ACETATE, DIBASIC POTASSIUM PHOSPHATE, MAGNESIUM CHLORIDE, SODIUM CHLORIDE, CALCIUM CHLORIDE, DEXTROSE
311; 238; 247; 170; 255; 247; 204; 179; 77; 880; 438; 489; 289; 213; 17; 297; 261; 51; 77; 33; 5 INJECTION INTRAVENOUS at 09:07

## 2023-01-01 RX ADMIN — LIDOCAINE HYDROCHLORIDE 60 MG: 20 INJECTION INTRAVENOUS at 01:07

## 2023-01-01 RX ADMIN — PROPOFOL 200 MCG/KG/MIN: 10 INJECTION, EMULSION INTRAVENOUS at 01:07

## 2023-01-01 RX ADMIN — HEPARIN SODIUM 5000 UNITS: 5000 INJECTION, SOLUTION INTRAVENOUS; SUBCUTANEOUS at 08:03

## 2023-01-01 RX ADMIN — ONDANSETRON 4 MG: 2 INJECTION INTRAMUSCULAR; INTRAVENOUS at 01:07

## 2023-01-01 RX ADMIN — LEVETIRACETAM 500 MG: 100 SOLUTION ORAL at 08:07

## 2023-01-01 RX ADMIN — ACETAMINOPHEN 1000 MG: 10 INJECTION, SOLUTION INTRAVENOUS at 02:07

## 2023-01-01 RX ADMIN — SODIUM CHLORIDE 1000 ML: 9 INJECTION, SOLUTION INTRAVENOUS at 03:02

## 2023-01-01 RX ADMIN — ACETAMINOPHEN 1000 MG: 10 INJECTION, SOLUTION INTRAVENOUS at 06:07

## 2023-02-24 NOTE — FIRST PROVIDER EVALUATION
"Medical screening examination initiated.  I have conducted a focused provider triage encounter, findings are as follows:    Brief history of present illness:  Patient and his family member state that patient has a history of throat CA. Family member states that patient "choked" on food x 2 days ago. States that today he is having throat pain and difficulty speaking.      There were no vitals filed for this visit.    Pertinent physical exam:  awake, alert    Brief workup plan:  labs, exam    Preliminary workup initiated; this workup will be continued and followed by the physician or advanced practice provider that is assigned to the patient when roomed.  "

## 2023-02-25 NOTE — ED PROVIDER NOTES
"Encounter Date: 2/24/2023    SCRIBE #1 NOTE: I, Tae Puri, am scribing for, and in the presence of,  Delaney Ruffin MD. I have scribed the following portions of the note - Other sections scribed: hpi, ros, pe.     History     Chief Complaint   Patient presents with    medical problem     Family states pt choked on food Wed, states today having trouble speaking.  Pt states pain when talking.  Does not feel like anything is stuck in his throat.  Pt swallowing secretions without difficulty.  Airway in tact.  Pt currently has throat CA.      81 y/o male with history of CAD, Dementia, HTN, "throat cancer", and PAD presenting to the ED complaining of difficulty swallowing. He also complains of sore throat, worsening pain when speaking, and hoarse voice. Specifically he is having trouble swallowing solid food but is able to tolerate liquids. Patient's family at bedside reports he chokes while eating solid foods but is unsure if patient had recent weight loss. Patient is not currently on any cancer treatments (none for the past year) because he was unable to tolerate it. He denies any fever or other complaints at present.     Per review of the chart, he had a history of prostate, lung and esophageal cancer.     PCP: Jamar Lenz,     The history is provided by the patient and a relative. No  was used.   Review of patient's allergies indicates:  No Known Allergies  Past Medical History:   Diagnosis Date    CAD in native artery     Dementia     DJD (degenerative joint disease)     GERD (gastroesophageal reflux disease)     Hallux valgus (acquired), unspecified foot     Hammer toe, acquired     Hx of cancer of lung     Hypercholesteremia     Hypertension     Lung cancer     Onychomycosis of toenail     PAD (peripheral artery disease)     PVD (peripheral vascular disease)     Tinea pedis     Tobacco user     Unspecified dementia without behavioral disturbance      Past Surgical History: "   Procedure Laterality Date    BACK SURGERY      CORONARY STENT PLACEMENT      DEBRIDEMENT OF FOOT Left     ESOPHAGOGASTRODUODENOSCOPY N/A 08/02/2022    Procedure: EGD w/ dil;  Surgeon: Pollo Hairston MD;  Location: [facility] ENDOSCOPY;  Service: Gastroenterology;  Laterality: N/A;    gastrointestinal biopsy      TOE AMPUTATION Left      Family History   Problem Relation Age of Onset    Stroke Mother     Diabetes Father      Social History     Tobacco Use    Smoking status: Every Day     Packs/day: 1.00     Types: Cigarettes    Smokeless tobacco: Never   Substance Use Topics    Alcohol use: Never    Drug use: Never     Review of Systems   Constitutional:  Negative for fever.   HENT:  Positive for sore throat, trouble swallowing and voice change.    Eyes:  Negative for visual disturbance.   Respiratory:  Positive for choking. Negative for cough and shortness of breath.    Cardiovascular:  Negative for chest pain.   Gastrointestinal:  Negative for abdominal pain, diarrhea, nausea and vomiting.   Genitourinary:  Negative for difficulty urinating.   Skin:  Negative for rash.   Neurological:  Negative for light-headedness and headaches.     Physical Exam     Initial Vitals [02/24/23 1144]   BP Pulse Resp Temp SpO2   102/72 85 14 97.9 °F (36.6 °C) 99 %      MAP       --         Physical Exam    Nursing note and vitals reviewed.  Constitutional: He is not diaphoretic. No distress.   Voice is hoarse   Patient is thin and frail.    HENT:   Head: Normocephalic and atraumatic.   Mouth/Throat: Oropharynx is clear and moist.   Left side of soft palate is full.   Tolerates secretions.   Patient is unable to tolerate solid foods.    Eyes: Conjunctivae and EOM are normal. No scleral icterus.   Neck: Neck supple.   Normal range of motion.  Cardiovascular:  Normal rate, regular rhythm and intact distal pulses.           Pulmonary/Chest: Breath sounds normal. No stridor. No respiratory distress. He has no wheezes. He has no rhonchi.  He has no rales.   Abdominal: Abdomen is soft. Bowel sounds are normal. He exhibits no distension. There is no abdominal tenderness.   Musculoskeletal:         General: No edema. Normal range of motion.      Cervical back: Normal range of motion and neck supple.      Right lower leg: No edema.      Left lower leg: No edema.     Neurological: He is alert and oriented to person, place, and time. He has normal strength. No cranial nerve deficit or sensory deficit. GCS eye subscore is 4. GCS verbal subscore is 5. GCS motor subscore is 6.   Skin: Skin is warm and dry. Capillary refill takes less than 2 seconds. No rash noted.   Psychiatric: He has a normal mood and affect.       ED Course   Procedures  Labs Reviewed   COMPREHENSIVE METABOLIC PANEL - Abnormal; Notable for the following components:       Result Value    Chloride 108 (*)     Creatinine 1.40 (*)     Calcium Level Total 8.7 (*)     All other components within normal limits   CBC WITH DIFFERENTIAL - Abnormal; Notable for the following components:    WBC 4.0 (*)     Hgb 13.4 (*)     MCV 76.5 (*)     MCHC 30.7 (*)     All other components within normal limits   MANUAL DIFFERENTIAL - Abnormal; Notable for the following components:    RBC Morph Abnormal (*)     Anisocyte 1+ (*)     Poik 2+ (*)     Ovalocytes 1+ (*)     Philadelphia Cells 1+ (*)     All other components within normal limits   CBC W/ AUTO DIFFERENTIAL    Narrative:     The following orders were created for panel order CBC Auto Differential.  Procedure                               Abnormality         Status                     ---------                               -----------         ------                     CBC with Differential[[id number]]        Abnormal            Final result               Manual Differential[[id number]]          Abnormal            Final result                 Please view results for these tests on the individual orders.          Imaging Results              X-Ray Chest AP Portable  (Final result)  Result time 02/24/23 22:11:44      Final result by Uri Lugo MD (02/24/23 22:11:44)                   Impression:      NO ACUTE CARDIOPULMONARY PROCESS IDENTIFIED.      Electronically signed by: Uri Lugo  Date:    02/24/2023  Time:    22:11               Narrative:    EXAMINATION:  XR CHEST AP PORTABLE    CLINICAL HISTORY:  Cough, unspecified    TECHNIQUE:  One view    COMPARISON:  October 30, 2022.    FINDINGS:  Cardiopericardial silhouette is within normal limits.  There is ectasia and/or aneurysmal dilatation of the thoracic aorta with similar appearance.  Obscured hilar structures.  Similar left perihilar chronic atelectasis or scarring.  No acute dense focal or segmental consolidation, congestive process, pleural effusions or pneumothorax.                                        CT Soft Tissue Neck With Contrast (Final result)  Result time 02/24/23 21:03:24      Final result by Buck Browning MD (02/24/23 21:03:24)                   Narrative:    EXAMINATION  CT SOFT TISSUE NECK WITH CONTRAST    CLINICAL HISTORY  throat cancer, dysphagia;    TECHNIQUE  Post-contrast helical-acquisition CT images of the neck were obtained and multiplanar reformats accomplished by a CT technologist at a separate workstation, pushed to PACS for physician review.    CONTRAST  IV: ISOVUE-370, 80 mL    COMPARISON  14 July 2021    FINDINGS  Images were reviewed in soft tissue, lung, and bone windows.    Exam quality: Degraded secondary to patient movement throughout image acquisition, with resulting artifact.    Aerodigestive structures: No focal abnormality of the salivary glands or oral cavity. The naso-/oropharynx is unremarkable. No new or enlarging mass-like lesion or other suspicious focal abnormality of the laryngeal structures or visualized upper trachea and esophagus.    Lymph nodes: Right upper paratracheal lymph node is increased in size in the interval, now measuring approximately 13 mm short axis  versus prior measurement of 9 mm (series 3, image 57).  No other suspicious enlarged lymph nodes or development of necrotic adenopathy appreciated.    Thyroid: Unremarkable.    Other findings: Regional vascular structures are similar in the interval.  There are similar chronic alterations of the included upper lung zones, with no acute pulmonary process identified.  The included intracranial structures are without acute process or focal abnormality. The mastoid air cells are well-aerated bilaterally. No acute or destructive osseous process is identified.  Degenerative alterations of the cervical spine are similar in comparison.  Paranasal sinuses are clear.    IMPRESSION  1. Interval enlargement of right upper paratracheal lymph node, metastatic involvement not excluded.  2. No other findings to suggest new or worsening focal abnormality within the neck and visualized upper chest.  3. Chronic secondary details are similar to the July 2021 CT appearance.  ==========    This report was flagged in Epic as abnormal.    RADIATION DOSE  Automated tube current modulation, weight-based exposure dosing, and/or iterative reconstruction technique utilized to reach lowest reasonably achievable exposure rate.    DLP: 502 mGy*cm      Electronically signed by: Buck Browning  Date:    02/24/2023  Time:    21:03                                  X-Rays:   Independently Interpreted Readings:   Chest X-Ray: No infiltrates.  No acute abnormalities.   Medications   0.9%  NaCl infusion (0 mLs Intravenous Stopped 2/24/23 2045)   iopamidoL (ISOVUE-370) injection 100 mL (80 mLs Intravenous Given 2/24/23 2042)   Medical Decision Making  Differential diagnosis includes but is not limited to pharyngitis/laryngitis, worsening malignancy, electrolyte abnormality, nerve impingement, esophageal stricture and others    Problems Addressed:  Dysphagia, unspecified type: undiagnosed new problem with uncertain prognosis  Malignant neoplasm of esophagus,  unspecified location: chronic illness or injury that poses a threat to life or bodily functions    Amount and/or Complexity of Data Reviewed  Independent Historian: spouse  Labs: ordered. Decision-making details documented in ED Course.  Radiology: ordered. Decision-making details documented in ED Course.       Medical Decision Making:   Initial Assessment:   82-year-old male with known esophageal cancer, not currently on treatment, also history of prostate and lung cancer, not currently on treatment, here for evaluation of dysphagia to solids.  Is able to tolerate fluids and his secretions.  His voice is hoarse but he is able to phonate.  No stridor is appreciated.  Labs obtained at triage to expedite care are unremarkable.  I will add CT scan of soft tissues of the neck to further evaluate for any masses. Reassess  Clinical Tests:   Lab Tests: Ordered and Reviewed  Radiological Study: Ordered and Reviewed        Scribe Attestation:   Scribe #1: I performed the above scribed service and the documentation accurately describes the services I performed. I attest to the accuracy of the note.    Attending Attestation:           Physician Attestation for Scribe:  Physician Attestation Statement for Scribe #1: I, reviewed documentation, as scribed by Tae Puri in my presence, and it is both accurate and complete.           ED Course as of 02/27/23 1418   Fri Feb 24, 2023 2116 CT concerning for increasing size of a right paratracheal lymph node.  It is likely metastatic in nature.  Chest x-ray does not show any obvious primary lung mass.  I have discussed these results with the patient.  As he is able to tolerate fluids, I have discussed a liquid diet.  He is to follow up with his PCP, gastroenterologist and oncologist regarding further evaluation and care.  Return precautions discussed   [RB]      ED Course User Index  [RB] Delaney Ruffin MD                 Clinical Impression:   Final diagnoses:  [R05.9]  Cough  [R13.10] Dysphagia, unspecified type (Primary)  [C15.9] Malignant neoplasm of esophagus, unspecified location        ED Disposition Condition    Discharge Stable          ED Prescriptions    None       Follow-up Information       Follow up With Specialties Details Why Contact Info    Jamar Lenz,  Internal Medicine Schedule an appointment as soon as possible for a visit in 3 days  [address] [address] [phone]      [facility]  Emergency Dept Emergency Medicine  As needed, If symptoms worsen [address] [facility] [id number] [phone]    Pollo Hairston MD Gastroenterology Schedule an appointment as soon as possible for a visit in 3 days  [address] [facility]  [address]  [place] [address]  [phone]               Delaney Ruffin MD  02/27/23 1913

## 2023-03-20 PROBLEM — T18.108A ESOPHAGEAL FOREIGN BODY, INITIAL ENCOUNTER: Status: ACTIVE | Noted: 2023-01-01

## 2023-03-20 NOTE — H&P
History and Physical     Patient Name: Ezequiel Merchant  MRN: [id number]  Admission Date: 3/19/2023  Hospital Length of Stay: 0 days  Code Status: Full Code  Attending Provider: Lynne Collins MD  Primary Care Provider: Jamar Lenz DO     Chief Complaint:   Foreign Body In Throat (States took pill and stuck in throat , hx of throat ca)      Subjective:      Brief HPI:  Mr. Ezequiel Merchant is a 82 y.o. AA male with a PMHx of HTN, HLD, CAD, PVD, esophageal CA receiving radiation therapy, lung CA, and prostate CA  who presents to [facility]'s ED on 3/19/23 for esophageal foreign body. Patient states at roughly 10 PM he attempted to take 6 of his evening meds at once, with one becoming lodged in his esophagus. He denies any stridor, SOB, drooling, or constant pain. He is endorsing dysphagia for the last couple of months and odynophagia since the attempt at 10 PM.     In ED, initial VS: 97.5 temp, 81 bpm, 18 RR, 128/75 bp. Patient satting 97-98% on RA.    Past Medical History:   Diagnosis Date    CAD in native artery     Dementia     DJD (degenerative joint disease)     GERD (gastroesophageal reflux disease)     Hallux valgus (acquired), unspecified foot     Hammer toe, acquired     Hx of cancer of lung     Hypercholesteremia     Hypertension     Lung cancer     Onychomycosis of toenail     PAD (peripheral artery disease)     PVD (peripheral vascular disease)     Tinea pedis     Tobacco user     Unspecified dementia without behavioral disturbance        Past Surgical History:   Procedure Laterality Date    BACK SURGERY      CORONARY STENT PLACEMENT      DEBRIDEMENT OF FOOT Left     ESOPHAGOGASTRODUODENOSCOPY N/A 08/02/2022    Procedure: EGD w/ dil;  Surgeon: Pollo Hairston MD;  Location: [facility] ENDOSCOPY;  Service: Gastroenterology;  Laterality: N/A;    gastrointestinal biopsy      TOE AMPUTATION Left        Review of patient's allergies indicates:  No Known  Allergies    Current Outpatient Medications   Medication Instructions    albuterol (PROVENTIL/VENTOLIN HFA) 90 mcg/actuation inhaler 2 puffs, Inhalation, Every 6 hours PRN, Rescue    amLODIPine (NORVASC) 5 mg, Oral, Daily    atorvastatin (LIPITOR) 20 mg, Oral, Daily    carvediloL (COREG) 3.125 mg, Oral, 2 times daily with meals    clopidogreL (PLAVIX) 75 mg, Oral, Daily    donepeziL (ARICEPT) 10 mg, Oral, Nightly    fluconazole (DIFLUCAN) 100 mg, Oral, Daily    fluticasone propionate (FLOVENT HFA) 220 mcg/actuation inhaler Inhalation, 2 times daily, Controller    levETIRAcetam (KEPPRA) 500 mg, Oral, 2 times daily    omeprazole (PRILOSEC) 20 MG capsule Oral, Daily    sacubitriL-valsartan (ENTRESTO) 49-51 mg per tablet 1 tablet, Oral, 2 times daily          Social History:     Social History     Socioeconomic History    Marital status:    Tobacco Use    Smoking status: Every Day     Packs/day: 1.00     Types: Cigarettes    Smokeless tobacco: Never   Substance and Sexual Activity    Alcohol use: Never    Drug use: Never       Family History   Problem Relation Age of Onset    Stroke Mother     Diabetes Father        Review of Systems:  Review of Systems   Constitutional:  Negative for chills, diaphoresis and fever.   HENT:  Negative for sore throat.    Respiratory:  Negative for cough, shortness of breath and stridor.    Cardiovascular:  Negative for chest pain, palpitations and leg swelling.   Gastrointestinal:  Negative for constipation, diarrhea, nausea and vomiting.   Skin:  Negative for rash.   Neurological:  Negative for dizziness, seizures, loss of consciousness, weakness and headaches.        Objective:     Vital Signs:  Vital Signs (Most Recent):  Temp: 97.5 °F (36.4 °C) (03/19/23 2037)  Pulse: 77 (03/19/23 2317)  Resp: 19 (03/19/23 2317)  BP: (!) 141/92 (03/19/23 2317)  SpO2: 97 % (03/19/23 2317)    Body mass index is 21.3 kg/m².  Weight: 58.1 kg (128 lb) Vital Signs (24h Range):  Temp:  [97.5 °F (36.4  °C)] 97.5 °F (36.4 °C)  Pulse:  [75-81] 77  Resp:  [18-21] 19  SpO2:  [94 %-98 %] 97 %  BP: (128-143)/(75-92) 141/92       Input/output:   No intake or output data in the 24 hours ending 03/19/23 8188    Physical Examination:  Physical Exam  Vitals reviewed.   Constitutional:       Appearance: Normal appearance.   HENT:      Head: Normocephalic.      Mouth/Throat:      Mouth: Mucous membranes are moist.   Eyes:      General: No scleral icterus.     Extraocular Movements: Extraocular movements intact.      Pupils: Pupils are equal, round, and reactive to light.   Cardiovascular:      Rate and Rhythm: Normal rate and regular rhythm.      Pulses: Normal pulses.      Heart sounds: Normal heart sounds. No murmur heard.  Pulmonary:      Effort: Pulmonary effort is normal. No respiratory distress.      Breath sounds: Normal breath sounds. No wheezing or rales.   Musculoskeletal:         General: Normal range of motion.      Cervical back: Normal range of motion.      Right lower leg: No edema.      Left lower leg: No edema.   Skin:     General: Skin is warm.      Capillary Refill: Capillary refill takes less than 2 seconds.      Coloration: Skin is not jaundiced or pale.      Findings: No lesion or rash.   Neurological:      General: No focal deficit present.      Mental Status: He is alert and oriented to person, place, and time. Mental status is at baseline.         Lines/Drains/Airways       None                    Laboratory:    No results for input(s): WBC, HGB, HCT, PLT, MCV, RDW in the last 24 hours.  No results for input(s): TROPONINI, CKTOTAL, CKMB, BNP in the last 24 hours.  No results for input(s): TROPONINI, CKTOTAL, CKMB, BNP in the last 168 hours.  No results for input(s): CHOL, HDL, LDLCALC, TRIG, CHOLHDL in the last 168 hours. No results for input(s): NA, K, CHLORIDE, CO2, BUN, CREATININE, GLU, CALCIUM, MG, PHOS in the last 24 hours.  No results for input(s): PROT, ALBUMIN, BILITOT, AST, ALKPHOS, ALT in the  last 24 hours.  No results for input(s): IRON, TIBC, FERRITIN, SATURATEDIRO, ITNNAHAR35, FOLATE in the last 168 hours.  No results for input(s): TSH, X2BOAMQ, HGBA1C, INR, PROTIME, PTT in the last 168 hours.           Other Results:    Current Medications:     Infusions:        Scheduled:   [START ON 3/20/2023] heparin (porcine)  5,000 Units Subcutaneous Q12H         PRN:   [START ON 3/20/2023] hydrALAZINE    [START ON 3/20/2023] labetaloL    [START ON 3/20/2023] melatonin    [START ON 3/20/2023] sodium chloride 0.9%        Microbiology Data:  Microbiology Results (last 7 days)       ** No results found for the last 168 hours. **             Antibiotics and Day Number of Therapy:  Antibiotics (From admission, onward)      None             Imaging:  CT Soft Tissue Neck WO Contrast  START OF REPORT:  Technique: CT of the soft tissues of the neck was performed without intravenous contrast with direct axial as well as sagittal and coronal reformations.    Comparison: Comparison is with study [id number] 20:18:20.    Clinical history: Foreign Body In Throat (States took pill and stuck in throat , hx of throat ca.    Findings:  Sinuses: There is some mucoperiosteal thickening in the left sphenoid sinus.  Nasopharynx: Unremarkable.  Oropharynx: Unremarkable.  Larynx: Unremarkable.  Trachea: Unremarkable.  Esophagus: There is a 11 x 13 x 17 mm (AP x T x CC) relatively hyperdense focus of material in the lumen of the cervical esophagus just below the cricopharynx (series 3 image 69 and series 9 image 48). This is consistent with a foreign body. Again noted is mild circumferential wall thickening of the visualized upper esophagus.  Thyroid glands: Unremarkable.  Lymph nodes: There is interval mild decrease in the size of the previously present right paratracheal lymph node, now measures 11 mm in short axis (series 3 image 75).    Bony structures: Moderate-to-severe degenerative changes are seen in the spine with grade I  retrolisthesis of C3 over C4.    Impression:  1. There is a 11 x 13 x 17 mm (AP x T x CC) relatively hyperdense focus of material in the lumen of the cervical esophagus just below the cricopharynx (series 3 image 69 and series 9 image 48). This is consistent with a foreign body. Again noted is mild circumferential wall thickening of the visualized upper esophagus. Correlate clinically as regards further evaluation and follow-up.  2. There is interval mild decrease in the size of the previously present right paratracheal lymph node, now measures 11 mm in short axis (series 3 image 75).  3. Details and other findings as described above.        2D ECHO Results    No results found in the last 24 hours.       Pulmonary Functions Testing Results:    No results found for: FEV1, FVC, JSP0COJ, TLC, DLCO    Assessment & Plan:     Esophageal Foreign Body, non-obstructive  Squamous Cell Carcinoma of Midesophagus  Hx of Squamous Cell Carcinoma of LT Lung, Stage I  Hx of Prostate Cancer  - Endorsing taking 6 pills at 10PM with one getting stuck, painful with swallowing, able to tolerate PO liquids, denies any SOB, satting 97% on RA, hemodynamically stable and afebrile  - Holding current PO medications, expectant management this time, continue to encourage PO liquid consumption  - Clear liquid diet  - Consider GI consult if esophageal foreign body present in morning  - Recently completed 2nd phase of Radiation therapy (3/17/23); not receiving chemotherapy at this time  - CT Neck revealed 11 x 13 x 17 mm hyperdense foci in cervical esophageal lumen just below cricopharynx. Mild circumferential wall thickening of upper esophagus.    HFrEF  - Currently stable, no evidence of acute exacerbation  - Holding home meds: Entresto 49-51 mg BID  - TTE 10/31/22 revealing EF 40-45%, concentric hypertrophy, mildly decreased systolic function, mild-mod AR, no evidence of intracardiac shunting    CAD  PVD  - Holding home meds: Lipitor 20 mg and  Plavix 75 mg daily  - Started on Heparin 5K q12hr    HTN  - Currently stable; holding home med PO anti-hypertensives for now (Amlodipine 5 mg daily, Coreg 12.5 BID, Entresto 49-51 mg BID)  - Consider IV anti-hypertensive if foreign body persist in AM  - PRN anti-hypertensives placed    HLD  - Holding home meds: Lipitor 20 mg daily    Unspecified Seizure Disorder  - Holding home med: Keppra 500 mg BID  - Consider IV Keppra dosing in morning if foreign body persist      CODE STATUS: Full Code   Access: PIV  Antibiotics: None  Diet: Diet clear liquid  DVT Prophylaxis: Heparin 5K q12hr  GI Prophylaxis: none  Fluids: None      Disposition: Patient admitted on 3/19/23 for esophageal foreign body following attempt to swallow 6 of evening pills at once, believed to be Keppra, per patient. Clear liquid diet now, encouraging patient to continue regular consumption of water. Holding all PO home meds. Expectant management at this time, will consider GI consult in morning if foreign body persist.       Hardik Goldberg MD  [facility], [facility]
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
## 2023-03-20 NOTE — DISCHARGE SUMMARY
[facility] S[facility] Telemetry Hospital Medicine  Discharge Summary      Patient Name: Ezequiel Merchant  MRN: [id number]  [facility]: [id number]  Patient Class: OP- Observation  Admission Date: 3/19/2023  Hospital Length of Stay: 1 days  Discharge Date and Time:  03/20/2023 9:46 AM  Attending Physician: Lynne Collins MD   Discharging Provider: Janine Palomino DO  Primary Care Provider: Jamar Lenz DO      HPI:   Mr. Ezequiel Merchant is a 82 y.o. AA male with a PMHx of HTN, HLD, CAD, PVD, esophageal CA receiving radiation therapy, lung CA, and prostate CA  who presents to [facility] ED on 3/19/23 for esophageal foreign body. Patient states at roughly 10 PM he attempted to take 6 of his evening meds at once, with one becoming lodged in his esophagus. He denies any stridor, SOB, drooling, or constant pain. He is endorsing dysphagia for the last couple of months and odynophagia since the attempt at 10 PM.      In ED, initial VS: 97.5 temp, 81 bpm, 18 RR, 128/75 bp. Patient satting 97-98% on RA.        Hospital Course:   Mr. Ezequiel Merchant is an 82 year old male that was admitted on the evening of 03/19/2023 after swallowing about 6 to 7 pills at once and states he felt as if one got stuck in his throat. The foreign body was seen and identified on CT soft tissue neck without contrast. He has had this happen once before and was no hospitalized for the issue. All of his vitals were stable on arrival to the ER and his symptoms completely has resolved by the following morning after he was witnessed coughing up the pill. He denies having pain in throat or chest, urinary symptoms, constipation/diarrhea, stomach pain, numbness, tingling, shortness of breath, fevers or chills. He has tolerated a liquid diet without nausea or vomiting or trouble swallowing. Patient medically optimized for discharge on 03/20/2023 after being evaluated by physical therapy and speech therapy.     ROS as seen in hospital  "course above.    Goals of Care Treatment Preferences:  Code Status: Full Code    Vitals:    03/20/23 0127 03/20/23 0127 03/20/23 0348 03/20/23 0746   BP: 129/82 129/82 129/84 137/83   Pulse: 79 79 72 74   Resp: 18 18 18 18   Temp: 97.9 °F (36.6 °C) 97.8 °F (36.6 °C) 98.6 °F (37 °C) 98.2 °F (36.8 °C)   TempSrc:  Oral Oral Oral   SpO2: 100% 100% 98% 99%   Weight: 57.2 kg (126 lb)      Height: 5' 5" (1.651 m)          Physical Exam  Constitutional:       General: He is not in acute distress.     Appearance: He is not toxic-appearing.      Comments: Elderly appearing male lying in bed eating Jello-O.   HENT:      Nose: Nose normal.      Mouth/Throat:      Mouth: Mucous membranes are moist.      Pharynx: No oropharyngeal exudate or posterior oropharyngeal erythema.   Eyes:      General: No scleral icterus.     Extraocular Movements: Extraocular movements intact.      Pupils: Pupils are equal, round, and reactive to light.   Cardiovascular:      Rate and Rhythm: Normal rate and regular rhythm.      Pulses: Normal pulses.      Heart sounds: Normal heart sounds. No murmur heard.  Pulmonary:      Effort: Pulmonary effort is normal. No respiratory distress.      Breath sounds: Normal breath sounds. No wheezing, rhonchi or rales.      Comments: Breathing comfortably on room air.  Chest:      Chest wall: No tenderness.   Abdominal:      General: Bowel sounds are normal. There is no distension.      Palpations: Abdomen is soft. There is no mass.      Tenderness: There is no abdominal tenderness.   Musculoskeletal:         General: No swelling or tenderness.      Cervical back: No tenderness.      Right lower leg: No edema.      Left lower leg: No edema.   Lymphadenopathy:      Cervical: No cervical adenopathy.   Skin:     General: Skin is warm.      Capillary Refill: Capillary refill takes less than 2 seconds.   Neurological:      Mental Status: He is alert and oriented to person, place, and time.       Significant Diagnostic " Studies:   Narrative & Impression     Technique:CT of the soft tissues of the neck was performed without intravenous contrast with direct axial as well as sagittal and coronal reformations.     Comparison:Comparison is with study dated 2023-02-24 20:18:20.     Clinical history:Foreign Body In Throat (States took pill and stuck in throat , hx of throat ca.     Findings:     Sinuses:There is some mucoperiosteal thickening in the left sphenoid sinus.     Nasopharynx:Unremarkable.     Oropharynx:Unremarkable.     Larynx:Unremarkable.     Trachea:Unremarkable.     Esophagus:There is a 11 x 13 x 17 mm (AP x T x CC) relatively hyperdense focus of material in the lumen of the cervical esophagus just below the cricopharynx (series 3 image 69 and series 9 image 48). This is consistent with a foreign body. Again noted is circumferential wall thickening of the visualized upper esophagus.     Thyroid glands:Unremarkable.     Lymph nodes:There is interval mild decrease in the size of the previously present right paratracheal lymph node, now measures 11 mm in short axis (series 3 image 75).     Bony structures:Moderate-to-severe degenerative changes are seen in the spine with grade I retrolisthesis of C3 over C4.     Impression:  Impression:     1. There is a 11 x 13 x 17 mm (AP x T x CC) relatively hyperdense focus of material in the lumen of the cervical esophagus just below the cricopharynx (series 3 image 69 and series 9 image 48). This is consistent with a foreign body. Again noted is mild circumferential wall thickening of the visualized upper esophagus. Correlate clinically as regards further evaluation and follow-up.     2. There is interval mild decrease in the size of the previously present right paratracheal lymph node, now measures 11 mm in short axis (series 3 image 75).     3. Details and other findings as described above.     No significant discrepancy with overnight report.        Electronically signed by: Uri  Lugo  Date:                                            03/20/2023  Time:                                           08:02           Exam Ended: 03/19/23 21:46 Last Resulted: 03/20/23 08:02                 Medications:  Reconciled Home Medications:      Medication List        CONTINUE taking these medications      albuterol 90 mcg/actuation inhaler  Commonly known as: PROVENTIL/VENTOLIN HFA  Inhale 2 puffs into the lungs every 6 (six) hours as needed for Wheezing. Rescue     amLODIPine 5 MG tablet  Commonly known as: NORVASC  Take 5 mg by mouth once daily.     atorvastatin 20 MG tablet  Commonly known as: LIPITOR  Take 20 mg by mouth once daily.     carvediloL 3.125 MG tablet  Commonly known as: COREG  Take 3.125 mg by mouth 2 (two) times daily with meals.     clopidogreL 75 mg tablet  Commonly known as: PLAVIX  Take 75 mg by mouth once daily.     donepeziL 10 MG tablet  Commonly known as: ARICEPT  Take 10 mg by mouth every evening.     ENTRESTO 49-51 mg per tablet  Generic drug: sacubitriL-valsartan  Take 1 tablet by mouth 2 (two) times daily.     fluconazole 100 MG tablet  Commonly known as: DIFLUCAN  Take 100 mg by mouth once daily.     fluticasone propionate 220 mcg/actuation inhaler  Commonly known as: FLOVENT HFA  Inhale into the lungs 2 (two) times daily. Controller     levETIRAcetam 500 MG Tab  Commonly known as: KEPPRA  Take 1 tablet (500 mg total) by mouth 2 (two) times daily.     omeprazole 20 MG capsule  Commonly known as: PRILOSEC  Take by mouth once daily.              Indwelling Lines/Drains at time of discharge:   Lines/Drains/Airways       None                    Final Active Diagnoses:    Diagnosis Date Noted POA    PRINCIPAL PROBLEM:  Esophageal foreign body, initial encounter [T18.108A] 03/20/2023 Unknown      Problems Resolved During this Admission:       Discharged Condition: stable    Disposition: Discahrge home on morning of 03/20/2023.      Patient Instructions:   Patient was instructed to  sit up for at least 20 minutes after taking pills and to only take one pill at a time. Also instructed to drink once full glass of water after taking medications.        Follow Up:   Follow-up Information       Jamar Lenz DO. Schedule an appointment as soon as possible for a visit in 1 week(s).    Specialty: Internal Medicine  Why: for post wards follow up  Contact information:  [address] [place] [id number] [phone]                               Janine Palomino DO  Department of Hospital Medicine  [facility] - [address] [place] [address] Surg Telemetry

## 2023-03-20 NOTE — ED PROVIDER NOTES
Encounter Date: 3/19/2023       History     Chief Complaint   Patient presents with    Foreign Body In Throat     States took pill and stuck in throat , hx of throat ca     Patient is 82-year-old gentleman presents emergency room complaints of a foreign body in his throat.  Patient reports that he took his Keppra approximately an hour prior to arrival in the emergency room, feels that the pill stuck in his throat.  Denies any trouble breathing at this time.  Reports feeling short of breath when he tries to swallow.  History of esophageal cancer.    The history is provided by the patient.   Review of patient's allergies indicates:  No Known Allergies  Past Medical History:   Diagnosis Date    CAD in native artery     Dementia     DJD (degenerative joint disease)     GERD (gastroesophageal reflux disease)     Hallux valgus (acquired), unspecified foot     Hammer toe, acquired     Hx of cancer of lung     Hypercholesteremia     Hypertension     Lung cancer     Onychomycosis of toenail     PAD (peripheral artery disease)     PVD (peripheral vascular disease)     Tinea pedis     Tobacco user     Unspecified dementia without behavioral disturbance      Past Surgical History:   Procedure Laterality Date    BACK SURGERY      CORONARY STENT PLACEMENT      DEBRIDEMENT OF FOOT Left     ESOPHAGOGASTRODUODENOSCOPY N/A 08/02/2022    Procedure: EGD w/ dil;  Surgeon: Pollo Hairston MD;  Location: [facility] ENDOSCOPY;  Service: Gastroenterology;  Laterality: N/A;    gastrointestinal biopsy      TOE AMPUTATION Left      Family History   Problem Relation Age of Onset    Stroke Mother     Diabetes Father      Social History     Tobacco Use    Smoking status: Every Day     Packs/day: 1.00     Types: Cigarettes    Smokeless tobacco: Never   Substance Use Topics    Alcohol use: Never    Drug use: Never     Review of Systems   Constitutional:  Negative for chills, fatigue and fever.   HENT:  Positive for sore throat and trouble  swallowing. Negative for ear pain and rhinorrhea.    Eyes:  Negative for photophobia and pain.   Respiratory:  Negative for cough, shortness of breath and wheezing.    Cardiovascular:  Negative for chest pain.   Gastrointestinal:  Negative for abdominal pain, diarrhea, nausea and vomiting.   Genitourinary:  Negative for dysuria.   Neurological:  Negative for dizziness, weakness and headaches.   All other systems reviewed and are negative.    Physical Exam     Initial Vitals [03/19/23 2037]   BP Pulse Resp Temp SpO2   128/75 81 18 97.5 °F (36.4 °C) (!) 94 %      MAP       --         Physical Exam    Nursing note and vitals reviewed.  Constitutional: He appears well-developed and well-nourished.   HENT:   Head: Normocephalic and atraumatic.   Mouth/Throat: Oropharynx is clear and moist. No oropharyngeal exudate.   Eyes: EOM are normal. Pupils are equal, round, and reactive to light.   Neck: Neck supple.   Normal range of motion.  Cardiovascular:  Normal rate, regular rhythm and normal heart sounds.     Exam reveals no gallop and no friction rub.       No murmur heard.  Pulmonary/Chest: Breath sounds normal. No stridor. No respiratory distress. He has no wheezes. He has no rhonchi. He has no rales.   Abdominal: Abdomen is soft. Bowel sounds are normal. He exhibits no distension. There is no abdominal tenderness.   Musculoskeletal:         General: Normal range of motion.      Cervical back: Normal range of motion and neck supple.     Neurological: He is alert and oriented to person, place, and time. He has normal strength.   Skin: Skin is warm and dry.   Psychiatric: He has a normal mood and affect. His behavior is normal. Judgment and thought content normal.       ED Course   Procedures  Labs Reviewed - No data to display       Imaging Results              CT Soft Tissue Neck WO Contrast (Preliminary result)  Result time 03/19/23 21:46:42      Preliminary result by Pollo Rosario MD (03/19/23 21:46:42)                    Narrative:    START OF REPORT:  Technique: CT of the soft tissues of the neck was performed without intravenous contrast with direct axial as well as sagittal and coronal reformations.    Comparison: Comparison is with study [id number] 20:18:20.    Clinical history: Foreign Body In Throat (States took pill and stuck in throat , hx of throat ca.    Findings:  Sinuses: There is some mucoperiosteal thickening in the left sphenoid sinus.  Nasopharynx: Unremarkable.  Oropharynx: Unremarkable.  Larynx: Unremarkable.  Trachea: Unremarkable.  Esophagus: There is a 11 x 13 x 17 mm (AP x T x CC) relatively hyperdense focus of material in the lumen of the cervical esophagus just below the cricopharynx (series 3 image 69 and series 9 image 48). This is consistent with a foreign body. Again noted is mild circumferential wall thickening of the visualized upper esophagus.  Thyroid glands: Unremarkable.  Lymph nodes: There is interval mild decrease in the size of the previously present right paratracheal lymph node, now measures 11 mm in short axis (series 3 image 75).    Bony structures: Moderate-to-severe degenerative changes are seen in the spine with grade I retrolisthesis of C3 over C4.      Impression:  1. There is a 11 x 13 x 17 mm (AP x T x CC) relatively hyperdense focus of material in the lumen of the cervical esophagus just below the cricopharynx (series 3 image 69 and series 9 image 48). This is consistent with a foreign body. Again noted is mild circumferential wall thickening of the visualized upper esophagus. Correlate clinically as regards further evaluation and follow-up.  2. There is interval mild decrease in the size of the previously present right paratracheal lymph node, now measures 11 mm in short axis (series 3 image 75).  3. Details and other findings as described above.                          Preliminary result by Interface, Rad Results In (03/19/23 21:46:42)                   Narrative:     START OF REPORT:  Technique: CT of the soft tissues of the neck was performed without intravenous contrast with direct axial as well as sagittal and coronal reformations.    Comparison: Comparison is with study [id number] 20:18:20.    Clinical history: Foreign Body In Throat (States took pill and stuck in throat , hx of throat ca.    Findings:  Sinuses: There is some mucoperiosteal thickening in the left sphenoid sinus.  Nasopharynx: Unremarkable.  Oropharynx: Unremarkable.  Larynx: Unremarkable.  Trachea: Unremarkable.  Esophagus: There is a 11 x 13 x 17 mm (AP x T x CC) relatively hyperdense focus of material in the lumen of the cervical esophagus just below the cricopharynx (series 3 image 69 and series 9 image 48). This is consistent with a foreign body. Again noted is mild circumferential wall thickening of the visualized upper esophagus.  Thyroid glands: Unremarkable.  Lymph nodes: There is interval mild decrease in the size of the previously present right paratracheal lymph node, now measures 11 mm in short axis (series 3 image 75).    Bony structures: Moderate-to-severe degenerative changes are seen in the spine with grade I retrolisthesis of C3 over C4.      Impression:  1. There is a 11 x 13 x 17 mm (AP x T x CC) relatively hyperdense focus of material in the lumen of the cervical esophagus just below the cricopharynx (series 3 image 69 and series 9 image 48). This is consistent with a foreign body. Again noted is mild circumferential wall thickening of the visualized upper esophagus. Correlate clinically as regards further evaluation and follow-up.  2. There is interval mild decrease in the size of the previously present right paratracheal lymph node, now measures 11 mm in short axis (series 3 image 75).  3. Details and other findings as described above.                                         Medications   sodium chloride 0.9% flush 10 mL (has no administration in time range)   melatonin tablet 6 mg  (has no administration in time range)   heparin (porcine) injection 5,000 Units (has no administration in time range)     Medical Decision Making:   Initial Assessment:   Patient currently breathing comfortably without any distress.  No stridor.  Will obtain a CT scan in order to evaluate for a pill foreign body.  Differential Diagnosis:   Pill foreign body, throat pain.           ED Course as of 03/19/23 2186   Sun Mar 19, 2023   2259 He does apparently have a foreign body within the esophagus.  Patient is able to tolerate water, currently trying to drink sips of water, and reports he feels as if the pill is beginning to move.  Due to patient's history esophageal cancer, would like to ensure that pill does pass that patient is eating and drinking well, therefore Internal Medicine has been consulted for observation admission. [MW]   2336 Internal Medicine has seen and evaluated the patient - will admit for observation. [MW]      ED Course User Index  [MW] Bladimir Aguirre MD                 Clinical Impression:   Final diagnoses:  [T18.108A] Esophageal foreign body, initial encounter        ED Disposition Condition    Observation Stable                Bladimir Aguirre MD  03/19/23 9337

## 2023-03-20 NOTE — PT/OT/SLP EVAL
"[facility]  Cranial Nerve Exam and Clinical Swallow Exam  Initial          Name: Ezequiel Merchant   MRN: [id number]    Therapy Diagnosis: WFL oropharyngeal swallow    Physician: Roberta Bello MD    Date of Evaluation:  03/20/2023      Speech Start Time:  0930  Speech Stop Time:  0945     Speech Total Time (min):  15 min    Billable Minutes: Eval Swallow and Oral Function        Procedure Min.   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 07/14/2021    Witnessed seizure-like activity 11/01/2022     Resolved Ambulatory Problems     Diagnosis Date Noted    Syncope 10/31/2022     Past Medical History:   Diagnosis Date    CAD in native artery     Dementia     DJD (degenerative joint disease)     GERD (gastroesophageal reflux disease)     Hallux valgus (acquired), unspecified foot     Hammer toe, acquired     Hx of cancer of lung     Hypercholesteremia     Hypertension     Lung cancer     Onychomycosis of toenail     PAD (peripheral artery disease)     PVD (peripheral vascular disease)     Tinea pedis     Tobacco user     Unspecified dementia without behavioral disturbance       Per medical record:   "Mr. Ezequiel Merchant is a 82 y.o. AA male with a PMHx of HTN, HLD, CAD, PVD, esophageal CA receiving radiation therapy, lung CA, and prostate CA  who presents to [facility]'s ED on 3/19/23 for esophageal foreign body. Patient states at roughly 10 PM he attempted to take 6 of his evening meds at once, with one becoming lodged in his esophagus. He denies any stridor, SOB, drooling, or constant pain. He is endorsing dysphagia for the last couple of months and odynophagia since the attempt at 10 PM."  Speech pathology consulted to assess swallow via clinical swallow evaluation.      Imaging:  CT Soft Tissue Neck: " 3/19/23  Impression:     1. There is a 11 x 13 x 17 mm (AP x T x CC) relatively hyperdense focus of material in the lumen of the cervical esophagus just below the cricopharynx (series 3 image 69 and series 9 image 48). This is consistent with a foreign body. Again noted is mild circumferential wall thickening of the visualized upper esophagus. Correlate clinically as regards further evaluation and follow-up.     2. There is interval mild decrease in the size of the previously present right paratracheal lymph node, now measures 11 mm in short axis (series 3 image 75).     3. Details and other findings as described above.     No significant discrepancy with overnight report.          General Information:  Affect: Alert  Cooperative  Oxygen:  room air  Hearing: WFL  Orientation:Ox3    Objective:       Cranial Nerve 5: Trigeminal Nerve  Motor Jaw Posture at rest: Closed  Mandible Elevation/Depression: WFL  Mandible lateralization: Unable to lateralize right  Abnormal movement: absent Interpretation:   intact   Sensory Forehead: WFL  Cheek: WFL  Jaw: WFL  Facial Pain: None noted Interpretation:   intact     Cranial Nerve 7: Facial Nerve  Motor Facial Symmetry: WNL  Wrinkle Forehead: WFL  Close eyes tightly: WFL  Labial Protrusion: WFL  Labial Retraction: WFL  Buccal Strength with Labial Seal: WFL  Abnormal movement: absent Interpretation:   intact   Sensory Formal testing not completed. Patient denied any changes in taste      Cranial Nerves IX and X: Glossopharyngeal and Vagus Nerves  Motor Palatal Symmetry (Rest): WNL  Palatal Symmetry (Movement): WNL  Cough:  adequate  Voice Prior to PO intake: hoarse  Resonance: Normal  Abnormal movement: absent Interpretation:   intact     Cranial Nerve XII: Hypoglossal Nerve  Motor Tongue at rest: WNL  Lingual Protrusion: WNL  Lingual Protrusion against Resistance: WNL  Lingual Lateralization: WNL  Abnormal movement: absent Interpretation:   intact     Other  information:  Volitional Swallow: Able to palpate laryngeal rise  Mucosal Quality: No abnormal findings  Secretion Management: Secretion Mgmt: adequate  Dentition: Edentulous. Patient reports dentures are at home.     Predisposing dysphagia risk factors: Hx of esophageal, lung and prostate CA  Clinical signs of possible chronic dysphagia: no overt s/sx  Precipitating dysphagia risk factors: underlying conditions.     Pain:   0/10  Pain Location / Description: no pain reported during this session    Assessment :     PO Trials:   Patient presented with:   ICE CHIPS: DNT  THIN:-self regulated thin liquid via cup  PUREE: self regulated  MINCED AND MOIST:   SOFT AND BITE SIZED:   SOLID: self regulated        Limitations: absent dentition    IMPRESSION:     Patient presents with a functional oropharyngeal swallow but hoarse voice. Oral phase remarkable for prolonged manipulation with solids 2/2 to edentulism. No overt difficulties and patient stated dentures are at home. No overt s/sx of airway invasion appreciated during this session. Patient appears safe to continue PO intake with aspiration precautions. ENT consult as indicated for voice. No further skilled ST services indicated at this time.       Goals:         Recommendations:     Consistency Recommendations:  Thin liquids (IDDSI 0) and Soft/bite size consistencies (IDDSI 6).  Medications should be taken Whole in thin liquids and one at a time.   Precautions:supervision recommended, sit as upright as possible, upright 30 minutes after meals, alternate food and liquid, decrease bite/drink size, and frequent oral care  Risk Management: use good oral hygiene , sit upright for all PO intake, and increase physical mobility as tolerated  Specialist Referrals: ENT  Ancillary Tests:   Therapy: Dysphagia therapy is not recommended at this time.  Frequency:  Follow-up exam: Follow up swallow study is not indicated at this time.            The Functional Oral Intake Scale  (FOIS) is an ordinal scale that is used to assess the current status and meaningful change in the oral intake. FOIS levels include:        TUBE DEPENDENT   (Levels 1-3) 1. No oral intake    2. Tube dependent with minimal/inconsistent oral intake    3. Tube supplements with consistent oral intake          TOTAL ORAL INTAKE (Levels 4-7) 4. Total oral intake of a single consistency    5. Total oral intake of multiple consistencies requiring special preparation    6. Total oral intake with no special preparation, but must avoid specific foods or liquid items    7. Total oral intake with no restrictions   Patient is currently judged to be at FOIS Level 6      Education:  Aspiration precautions and Recommendations     Learners: Patient, RN (Jacque), were educated on the results and recommendations of this evaluation. All expressed understanding. Patient will benefit from ongoing education.    Barriers to Learning: age    Teaching Method: Verbal      Reference:   American Speech-Language-Hearing Association. (n.d.b). Adult dysphagia. (Practice Portal). https://www.hermelindo.org/practice-portal/clinical-topics/adult-dysphagia/  NELI Desai T. (2018). Use of modified diets to prevent aspiration in oropharyngeal dysphagia: Is current practice justified?. BMC Geriatrics, 18(1), 1-10.  NELI Lancaster., RAMONITA Hall., Rosalie P., & Fritz, CHRISTINA. (2002). Predictors of aspiration pneumonia in nursing home residents.  Dysphagia, 17(4), 298-307.  YADY Claudio (2016). Best practices for dehydration prevention. Perspectives of the HERMELINDO Special Interest Groups - SIG 13, 1(2), 72- 80.          Chas Ng M.S. [facility]3]-SLP  [facility].

## 2023-03-20 NOTE — ED NOTES
"Quick Triage.  Wife states "pills stuck in his throat and he is choking".  Speaks in full sentences, airway clear at this time.  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  "

## 2023-03-20 NOTE — HOSPITAL COURSE
Mr. Ezequiel Merchant is an 82 year old male that was admitted on the evening of 03/19/2023 after swallowing about 6 to 7 pills at once and states he felt as if one got stuck in his throat. He has had this happen once before and was no hospitalized for the issue. All of his vitals were stable on arrival to the ER and his symptoms completely has resolved by the following morning. He denies having pain in throat or chest, urinary symptoms, constipation/diarrhea, stomach pain, numbness, tingling, shortness of breath, fevers or chills. He has tolerated a liquid diet without nausea or vomiting or trouble swallowing. Patient medically optimized for discharge on 03/20/2023 after being evaluated by physical therapy and speech therapy.

## 2023-03-20 NOTE — PROGRESS NOTES
Inpatient Nutrition Assessment    Admit Date: 3/19/2023   Total duration of encounter: 1 day     Nutrition Recommendation/Prescription     ADAT to consistency per SLP recs  Boost Plus (provides 360 kcal, 14 g protein per serving) TID once diet advanced  Monitor Weights Weekly     Communication of Recommendations: reviewed with patient/caregiver and reviewed with nurse    Nutrition Assessment     Malnutrition Assessment/Nutrition-Focused Physical Exam    Malnutrition in the context of chronic illness  Degree of Malnutrition: non-severe (moderate) malnutrition  Energy Intake: does not meet criteria  Interpretation of Weight Loss: >7.5% in 3 months  Body Fat: does not meet criteria  Area of Body Fat Loss: does not meet criteria  Muscle Mass Loss: mild depletion  Area of Muscle Mass Loss: temple region - temporalis muscle  Fluid Accumulation: does not meet criteria  Edema: no edema present  Reduced  Strength: unable to obtain  A minimum of two characteristics is recommended for diagnosis of either severe or non-severe malnutrition.    Chart Review    Reason Seen: continuous nutrition monitoring    Malnutrition Screening Tool Results   Have you recently lost weight without trying?: No  Have you been eating poorly because of a decreased appetite?: No   MST Score: 0     Diagnosis:  Esophageal Foreign Body nonobstructive; Squamous cell Carcinoma of Mid esophagus, HFrEF, CAD, PVD, HLD, Unspecified seizure disorder    Relevant Medical History: HTN, HLD, CAD, PVD, Esophageal CA, Lung CA, Prostate CA    Nutrition-Related Medications: no nutrition related  Calorie Containing IV Medications: no significant kcals from medications at this time    Nutrition-Related Labs:  No labs this admit  2/24/23 -- Glu 87, K 4.3, BUN 13, Cr 1.4    Diet/PN Order: Diet clear liquid  Oral Supplement Order: Boost Breeze  Tube Feeding Order: none  Appetite/Oral Intake: good/% of meals  Factors Affecting Nutritional Intake:  "difficulty/impaired swallowing  Food/Confucianist/Cultural Preferences: none reported  Food Allergies: none reported       Wound(s):   skin intact    Comments    3/20/23 -- Pt on CL diet this am; pt denies difficulty swallowing liquids; SLP consulted for swallow eval noted; pt reports good appetite requesting honey bun; reports 2 meals with snacks daily & Ensure Plus BID PTA; pt unsure of UBW, significant wt loss noted within the last 3 months per EMR wt hx -- will order Boost Plus to supplement diet once diet advanced    Anthropometrics    Height: 5' 5" (165.1 cm) Height Method: Stated  Last Weight: 57.2 kg (126 lb) (03/20/23 0127) Weight Method: Bed Scale  BMI (Calculated): 21  BMI Classification: underweight (BMI less than 22 if >65 years of age)        Ideal Body Weight (IBW), Male: 136 lb     % Ideal Body Weight, Male (lb): 92.65 %                          Usual Weight Provided By: unable to obtain usual weight    Wt Readings from Last 5 Encounters:   03/20/23 57.2 kg (126 lb)   02/24/23 59 kg (130 lb)   10/31/22 58.1 kg (128 lb)   08/02/22 67.6 kg (149 lb)   07/11/22 65 kg (143 lb 4.8 oz)     Weight Change(s) Since Admission:  Admit Weight: 58.1 kg (128 lb) (03/19/23 2037)      Estimated Needs    Weight Used For Calorie Calculations: 57 kg (125 lb 10.6 oz)  Energy Calorie Requirements (kcal): 3102-3501 kcal (32 kcal/kg)  Energy Need Method: Kcal/kg  Weight Used For Protein Calculations: 57 kg (125 lb 10.6 oz)  Protein Requirements: 68-80 gm (1.2 - 1.4 gm/kg)  Fluid Requirements (mL): 9925-3653 ml (1ml/kcal)  Temp: 97.8 °F (36.6 °C)       Enteral Nutrition    Patient not receiving enteral nutrition at this time.    Parenteral Nutrition    Patient not receiving parenteral nutrition support at this time.    Evaluation of Received Nutrient Intake    Calories: not meeting estimated needs  Protein: not meeting estimated needs    Patient Education    Not applicable.    Nutrition Diagnosis     PES: Malnutrition related " to cancer as evidenced by >7.5% wt loss x 3 months, mild muscle wasting. (new)    Interventions/Goals     Intervention(s): modified composition of meals/snacks, commercial beverage, and collaboration with other providers  Goal: Meet greater than 75% of nutritional needs by follow-up. (new)    Monitoring & Evaluation     Dietitian will monitor food and beverage intake, weight change, and gastrointestinal profile.  Nutrition Risk/Follow-Up: moderate (follow-up in 3-5 days)   Please consult if re-assessment needed sooner.
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
## 2023-03-20 NOTE — ED NOTES
The Medicine Team in room at the bedside at this time. Patient states he is able to swallow but experiences discomfort while doing so. Sp02=98% on room air. Nadn. Will continue to monitor.

## 2023-03-20 NOTE — HPI
Mr. Ezequiel Merchant is a 82 y.o. AA male with a PMHx of HTN, HLD, CAD, PVD, esophageal CA receiving radiation therapy, lung CA, and prostate CA  who presents to [facility]'s ED on 3/19/23 for esophageal foreign body. Patient states at roughly 10 PM he attempted to take 6 of his evening meds at once, with one becoming lodged in his esophagus. He denies any stridor, SOB, drooling, or constant pain. He is endorsing dysphagia for the last couple of months and odynophagia since the attempt at 10 PM.      In ED, initial VS: 97.5 temp, 81 bpm, 18 RR, 128/75 bp. Patient satting 97-98% on RA.

## 2023-03-20 NOTE — ED NOTES
"Pt reports to the ed c/o trouble swallowing after taking medications approximately 1 hour ago. Also states history of throat cancer. Able to speak in "full sentences" at this time. Vss. Willl continue to monitor.  "

## 2023-07-07 NOTE — TELEPHONE ENCOUNTER
Received voicemail from Patience, pt friend requesting referral to [facility] for supplements. Pt has not been seen by medical oncology at [facility] in over a year. Explained that pt needs to be in active care with [facility] physician in order to be referred to [facility] for supplements. Patience stated that she was told by Dr. Garcia's office that he would be referred. Explained that the radiation oncology office is separate from [facility] and that she would need to call there for further assistance. She verbalized understanding.

## 2023-07-07 NOTE — PROGRESS NOTES
Speech Language Pathology Department  Modified Barium Swallow (MBS) Study    Patient Name:  Ezequiel Merchant   MRN:  [id number]    Recommendations:     General recommendations:  dysphagia therapy and ENT consult if not already established  Repeat MBS study: 4-6 weeks  Diet recommendations:  soft and bite sized solids, mildly thick liquids  Swallow strategies/precautions: small bites/sips and medications crushed in puree  General precautions: Standard,      History/Reason for Referral:     Ezequiel Merchant is a/n 82 y.o. male presents for MBS completion.  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  Pt with recent ED presentation to [facility] due to complaints of choking on medications.  Soft tissue neck revealed a foreign body just below cricopharynx.  Pt was seen by SLP on 3/20/23 with recommendations of soft and bite sized solids with thin liquids.  Met with patient and granddaughter, both are unsure of location of cancer, previous treatments, and if patient has been evaluated by an ENT.  Granddaughter reports patient does have home health speech therapy services.    Past Medical History:   Diagnosis Date    CAD in native artery     Dementia     DJD (degenerative joint disease)     GERD (gastroesophageal reflux disease)     Hallux valgus (acquired), unspecified foot     Hammer toe, acquired     Hx of cancer of lung     Hypercholesteremia     Hypertension     Lung cancer     Onychomycosis of toenail     PAD (peripheral artery disease)     PVD (peripheral vascular disease)     Tinea pedis     Tobacco user     Unspecified dementia without behavioral disturbance      Past Surgical History:   Procedure Laterality Date    BACK SURGERY      CORONARY STENT PLACEMENT      DEBRIDEMENT OF FOOT Left     ESOPHAGOGASTRODUODENOSCOPY N/A 08/02/2022    Procedure: EGD w/ dil;  Surgeon: Pollo Hairston MD;  Location: [facility] ENDOSCOPY;  Service: Gastroenterology;   Laterality: N/A;    gastrointestinal biopsy      TOE AMPUTATION Left      A MBS Study was completed to assess the efficiency of his swallow function, rule out aspiration and make recommendations regarding safe dietary consistencies, effective compensatory strategies, and safe eating environment.       Home Diet: Soft & Bite-sized and thin liquids    Patient complaint: difficulty swallowing    Imaging   No results found for this or any previous visit.    Results for orders placed in visit on 05/29/19    CT Chest With Contrast    Narrative  CLINICAL:  Malignant neoplasm of the upper lobe, left bronchus.    TECHNIQUE:  Multidetector axial images were performed from thoracic  inlet to below hemidiaphragms following intravenous contrast  administration.  Sagittal and coronal reformations performed.    Automated exposure control was utilized to minimize radiation dose.    Comparison made to CT chest February 19, 2019 and PET scan April 20, 2017, 2018    FINDINGS:  PET scan acquired on April 27, 2018 was remarkable for left  upper lung lobe hypermetabolic mass which was a biopsy-proven squamous  cell carcinoma. This regressed in size on the subsequent CT chest  exams. On  the current exam in the same vicinity of previous mass,  there are coarse linear airspace densities which maintain similar  appearance and distribution. There is no development of new discrete  pulmonary nodule or mass lesion. Bilateral lungs dependent  hypoventilatory changes and lungs are also unremarkable for  centrilobular emphysema. There is no acute airspace consolidation or  cavitary process. Pleural and the pericardial spaces are without fluid  accumulation.    Stable size of right thyroid gland hypodense nodule. No enlarging  thoracic lymphadenopathy. There is mild ectasia of ascending aorta  with maximum diameter of 3.7 cm. Heart size is upper limits of normal.    Small size hiatal hernia. No acute findings of the visualized upper  abdomen  viscera. Degenerative changes of the spine. No acute or  aggressive skeletal abnormality.    IMPRESSION:    1.  Left upper lung lobe coarse linear densities remain stable and  have the appearance of pulmonary scarring.  2.  No new findings.      Electronically Signed By: Uri Lugo MD  Date/Time Signed: 05/29/2019 11:40    Results for orders placed during the hospital encounter of 10/30/22    MRI BRAIN WITHOUT CONTRAST    Narrative  EXAMINATION:  MRI BRAIN WITHOUT CONTRAST    CLINICAL HISTORY:  Stroke, follow up;    TECHNIQUE:  Multiplanar, multisequence MR images of the brain were obtained without the administration of intravenous contrast.    COMPARISON:  CT head dated 10/30/2022    FINDINGS:  There is no restricted diffusion, hemorrhage or edema. There are scattered small areas of encephalomalacia in the right frontal lobe, bilateral basal ganglia, thalami and bilateral cerebellar hemispheres, with moderate patchy T2/FLAIR hyperintensities in the subcortical and periventricular white matter.    There is no mass effect or midline shift.  The basal cisterns are patent. There is parenchymal volume loss with ex vacuo dilatation of the right frontal horn. There is no abnormal extra-axial fluid collection.  The major intracranial flow voids are patent.  The paranasal sinuses and mastoid air cells are clear.    Impression  1. No acute intracranial abnormality.  2. Moderate chronic microvascular ischemic changes.      Electronically signed by: Martina Christiansen  Date:    10/31/2022  Time:    12:42    Subjective:     Patient awake, alert, and calm.    Spiritual/Cultural/Mormonism Beliefs/Practices that affect care:  none  Pain/Comfort:  none    Respiratory Status: room air    Radiologist: Christopher Lazar MD    Fluoroscopic Results:     Oral Musculature  Dentition: missing teeth  Secretion Management: adequate  Mucosal Quality: good  Facial Movement: reduced right  Buccal Strength & Mobility: WFL  Mandibular Strength &  Mobility: WFL  Oral Labial Strength & Mobility: WFL  Lingual Strength & Mobility: WFL  Vocal Quality: hoarse    Positioning:  upright in chair  Visualization  Patient was seen in the lateral view    Oral Phase:   Bolus holding  Disorganized lingual movement  Prolonged mastication  Piecemeal deglutition  Loss of bolus control with prespill to the pyriform sinuses    Pharyngeal Phase:   Swallow delay with spill to the pyriform sinuses  Reduced base of tongue retraction  Reduced epiglottic deflection  Reduced hyolaryngeal excursion  Poor airway protection  Aspiration SILENT of thin liquids during the swallow  Consistency Laryngeal Penetration Aspiration Residue   Thin liquid by cup Before the swallow  During the swallow During the swallow  SILENT  Delayed cough of approximately 15 seconds, nonproductive None   Puree None None Mild  Pyriform sinus  Reduced with additional swallow   Mildly thick liquid by cup None None Mild  Base of tongue, Valleculae, and Pyriform sinus   Chewable solid None None Mild  Base of tongue and Valleculae  Reduced with additional swallow and liquid wash   Mildly thick liquid by straw None None Mild  Base of tongue, Valleculae, Pyriform sinus, and Posterior pharyngeal wall  Reduced with additional swallow       Cervical Esophageal Phase:   UES appeared to accommodate all bolus types without stasis or retrograde movement visualized    Assessment:     Pt exhibited moderate oropharyngeal dysphagia characterized by the findings noted above.  SILENT aspiration of thin liquids did NOT clear from the laryngeal vestibule.  Both swallow safety and efficiency are impaired.     Patient appears to be at moderate risk for aspiration related pneumonia when considering oral health status, reduced overall health/immune status, reduced laryngeal vestibule closure, and severity of dysphagia.  Prognosis for behavioral swallow rehabilitation is fair.    Patient Education:     Patient and granddaughter provided  with verbal and written education regarding MBS results, diet modifications to reduce the risk of aspiration, and benefits of continuing with skilled SLP servcies.  Understanding was verbalized.    Plan:     SLP Follow-Up:       Patient to be seen:      Plan of Care expires:     Plan of Care reviewed with:        Time Tracking:     SLP Treatment Date:    7/7/23  Speech Start Time:   0900  Speech Stop Time:      0930  Speech Total Time (min):   30 min    Billable minutes:   Motion Fluoroscopic Evaluation, Video Recording, 30 minutes     07/07/2023

## 2023-07-21 NOTE — FIRST PROVIDER EVALUATION
Medical screening examination initiated.  I have conducted a focused provider triage encounter, findings are as follows:    Brief history of present illness:  Patient's family states that patient has had difficulty swallowing. States that patient had a swallow study done a couple of weeks ago.    There were no vitals filed for this visit.    Pertinent physical exam:  Awake, alert, ambulatory      Brief workup plan:  Labs, Imaging.     Preliminary workup initiated; this workup will be continued and followed by the physician or advanced practice provider that is assigned to the patient when roomed.

## 2023-07-21 NOTE — H&P
[facility]  Hospital Medicine History & Physical Examination       Patient Name: Ezequiel Merchant  MRN: [id number]  Patient Class: Emergency   Admission Date: 7/21/2023   Admitting Physician:  Service   Attending Physician: Eugene Leo MD  Primary Care Provider: Jamar Lenz DO  Face-to-Face encounter date: 07/21/2023  Code Status:Code Status Discussion Note   Chief Complaint: Dysphagia (Difficutly swallowing for a while, had a swallow evaluation, on Monday went to MD, dx with aspiration, referral to get PEG tube. )          Patient information was obtained from patient, patient's family, past medical records and ER records.     HISTORY OF PRESENT ILLNESS:   Ezequiel Merchant is a 82 y.o. male who  has a past medical history of CAD in native artery, Dementia, DJD (degenerative joint disease), GERD (gastroesophageal reflux disease), Hallux valgus (acquired), unspecified foot, Hammer toe, acquired, cancer of lung, Hypercholesteremia, Hypertension, Lung cancer, Onychomycosis of toenail, PAD (peripheral artery disease), PVD (peripheral vascular disease), Tinea pedis, Tobacco user, and Unspecified dementia without behavioral disturbance.. The patient presented to [facility] on 7/21/2023     82-year-old male with past medical history of lung cancer, esophageal cancer, and remote prostate cancer the.  Unclear if these are metastasis were  diagnoses with.  He has not followed by Oncology as an outpatient.  Never received chemotherapy.  He did complete 30 fractions of radiation therapy is still followed by the radiation oncologist here at [facility].  He presents to the ER on 07/21 with worsening swallowing.  He is now choking on all liquids and medications.  The is a family friend bedside he states that they have seen GI in the past and has had to have an esophageal dilation.  She thinks it was about a year ago.  On review of the chart it looks like he was  in August of 2022.  She states that they recently saw their primary care physician who was recommending PEG tube due to he did some better nutrition.  He appears cachectic but friend states that he still pretty active and is able to visit with family.  Denies any current pain.  On conversations of his goals of care they both state that they have never considered hospice.  He did have palliative care visit with them about a year ago but decided that he was not ready for it.  They are more open to it at this point.  They both report that he would like the PEG tube to try to improve his quality of life.  We discussed code status.  They have not made any considerations in this area.  Will remain full code for now but will continues conversations through the weekend.  PAST MEDICAL HISTORY:     Past Medical History:   Diagnosis Date    CAD in native artery     Dementia     DJD (degenerative joint disease)     GERD (gastroesophageal reflux disease)     Hallux valgus (acquired), unspecified foot     Hammer toe, acquired     Hx of cancer of lung     Hypercholesteremia     Hypertension     Lung cancer     Onychomycosis of toenail     PAD (peripheral artery disease)     PVD (peripheral vascular disease)     Tinea pedis     Tobacco user     Unspecified dementia without behavioral disturbance        PAST SURGICAL HISTORY:     Past Surgical History:   Procedure Laterality Date    BACK SURGERY      CORONARY STENT PLACEMENT      DEBRIDEMENT OF FOOT Left     ESOPHAGOGASTRODUODENOSCOPY N/A 08/02/2022    Procedure: EGD w/ dil;  Surgeon: Pollo Hairston MD;  Location: [facility] ENDOSCOPY;  Service: Gastroenterology;  Laterality: N/A;    gastrointestinal biopsy      TOE AMPUTATION Left        ALLERGIES:   Patient has no known allergies.    FAMILY HISTORY:   Reviewed and negative    SOCIAL HISTORY:     Social History     Tobacco Use    Smoking status: Every Day     Packs/day: 1.00     Types: Cigarettes    Smokeless tobacco: Never    Substance Use Topics    Alcohol use: Never        HOME MEDICATIONS:     Prior to Admission medications    Medication Sig Start Date End Date Taking? Authorizing Provider   albuterol (PROVENTIL/VENTOLIN HFA) 90 mcg/actuation inhaler Inhale 2 puffs into the lungs every 6 (six) hours as needed for Wheezing. Rescue    Historical Provider   amLODIPine (NORVASC) 5 MG tablet Take 5 mg by mouth once daily.    Historical Provider   atorvastatin (LIPITOR) 20 MG tablet Take 20 mg by mouth once daily.    Historical Provider   carvediloL (COREG) 3.125 MG tablet Take 3.125 mg by mouth 2 (two) times daily with meals.    Historical Provider   clopidogreL (PLAVIX) 75 mg tablet Take 75 mg by mouth once daily.    Historical Provider   donepeziL (ARICEPT) 10 MG tablet Take 10 mg by mouth every evening.    Historical Provider   fluconazole (DIFLUCAN) 100 MG tablet Take 100 mg by mouth once daily.    Historical Provider   fluticasone propionate (FLOVENT HFA) 220 mcg/actuation inhaler Inhale into the lungs 2 (two) times daily. Controller    Historical Provider   levETIRAcetam (KEPPRA) 500 MG Tab Take 1 tablet (500 mg total) by mouth 2 (two) times daily. 11/2/22   Lawson Leong MD   omeprazole (PRILOSEC) 20 MG capsule Take by mouth once daily.    Historical Provider   sacubitriL-valsartan (ENTRESTO) 49-51 mg per tablet Take 1 tablet by mouth 2 (two) times daily.    Historical Provider       REVIEW OF SYSTEMS:   Except as documented, all other systems reviewed and negative     PHYSICAL EXAM:     VITAL SIGNS: 24 HRS MIN & MAX LAST   Temp  Min: 97.6 °F (36.4 °C)  Max: 97.6 °F (36.4 °C) 97.6 °F (36.4 °C)   BP  Min: 97/66  Max: 128/80 128/80   Pulse  Min: 65  Max: 70  65   Resp  Min: 18  Max: 20 18   SpO2  Min: 98 %  Max: 99 % 98 %       General appearance:  Cachectic, frail male in no apparent distress.  HENT: Atraumatic head. Moist mucous membranes of oral cavity.  Eyes: Normal extraocular movements.   Neck: Supple.   Lungs: Clear to  auscultation bilaterally. No wheezing present.   Heart: Regular rate and rhythm. S1 and S2 present with no murmurs/gallop/rub. No pedal edema. No JVD present.   Abdomen: Soft, non-distended, non-tender. No rebound tenderness/guarding. Bowel sounds are normal.   Extremities: No cyanosis, clubbing, or edema.  Skin: No Rash.   Neuro: Motor and sensory exams grossly intact. Good tone. Muscle strength 5/5 in all 4 extremities, global atrophy, weak voids  Psych/mental status: Appropriate mood and affect. Responds appropriately to questions.     LABS AND IMAGING:     Recent Labs   Lab 07/21/23  1125   WBC 4.76   RBC 5.46   HGB 12.8*   HCT 40.7*   MCV 74.5*   MCH 23.4*   MCHC 31.4*   RDW 14.6      MPV 10.4       Recent Labs   Lab 07/21/23  1125      K 4.2   CO2 27   BUN 22.6   CREATININE 1.38*   CALCIUM 9.3   ALBUMIN 3.2*   ALKPHOS 72   ALT 9   AST 16   BILITOT 0.6       Microbiology Results (last 7 days)       ** No results found for the last 168 hours. **             X-Ray Chest 1 View  Narrative: EXAMINATION:  XR CHEST 1 VIEW    CLINICAL HISTORY:  Food in respiratory tract, part unspecified causing other injury, initial encounter    TECHNIQUE:  Single view of the chest    COMPARISON:  02/24/2023    FINDINGS:  No focal opacification, pleural effusion, or pneumothorax.    The cardiomediastinal silhouette is within normal limits.    No acute osseous abnormality.  Impression: No acute cardiopulmonary process.    Electronically signed by: Rakesh Mena  Date:    07/21/2023  Time:    14:56      _____________________________________  INPATIENT LIST OF MEDICATIONS   No current facility-administered medications for this encounter.    Current Outpatient Medications:     albuterol (PROVENTIL/VENTOLIN HFA) 90 mcg/actuation inhaler, Inhale 2 puffs into the lungs every 6 (six) hours as needed for Wheezing. Rescue, Disp: , Rfl:     amLODIPine (NORVASC) 5 MG tablet, Take 5 mg by mouth once daily., Disp: , Rfl:      atorvastatin (LIPITOR) 20 MG tablet, Take 20 mg by mouth once daily., Disp: , Rfl:     carvediloL (COREG) 3.125 MG tablet, Take 3.125 mg by mouth 2 (two) times daily with meals., Disp: , Rfl:     clopidogreL (PLAVIX) 75 mg tablet, Take 75 mg by mouth once daily., Disp: , Rfl:     donepeziL (ARICEPT) 10 MG tablet, Take 10 mg by mouth every evening., Disp: , Rfl:     fluconazole (DIFLUCAN) 100 MG tablet, Take 100 mg by mouth once daily., Disp: , Rfl:     fluticasone propionate (FLOVENT HFA) 220 mcg/actuation inhaler, Inhale into the lungs 2 (two) times daily. Controller, Disp: , Rfl:     levETIRAcetam (KEPPRA) 500 MG Tab, Take 1 tablet (500 mg total) by mouth 2 (two) times daily., Disp: 60 tablet, Rfl: 3    omeprazole (PRILOSEC) 20 MG capsule, Take by mouth once daily., Disp: , Rfl:     sacubitriL-valsartan (ENTRESTO) 49-51 mg per tablet, Take 1 tablet by mouth 2 (two) times daily., Disp: , Rfl:       Scheduled Meds:    Continuous Infusions:  PRN Meds:.      VTE Prophylaxis: will be placed on appropriate DVT prophylaxis and will be advised to be as mobile as possible and sit in a chair as tolerated  _____________________________________    ASSESSMENT & PLAN:   Esophageal cancer with obstructing mass   Severe protein calorie malnutrition  Oropharyngeal dysphagia   Anemia of malignancy  History of:  Lung cancer, prostate cancer, hypertension, seizure disorder, dementia, coronary artery disease, chronic systolic heart failure     Per family member patient has completed all radiation treatment.  He was not a candidate for chemotherapy.  He has not had any conversations regarding goals of care including hospice or palliative care.  We did reach the subject today and they are interested in learning more.  Will see if we can get 1 of the hospice companies to visit with him this weekend.    There are certain though that they would like to proceed with PEG tube placement.  They feel that this will give him a better quality of  life as he is still pretty active in communicates with his family.  Will start him on some Clinimix for now.  GI has been consulted.    His labs overall appeared to be stable.  He does have a mildly elevated creatinine which is about the same from labs done about a month ago.  Will hold oral meds and try to substitue IV where possible to minimize aspiration risk.  NPO    Advanced Directives:  I spent 30 mins of face to face discussion regarding advanced directives and end of life planning which included the patient and patients family, who concluded, that they would like to remain FULL CODE. The patient understands the seriousness of his/her condition and would like to make his/her end of life decision known. They are open to further conversations.

## 2023-07-21 NOTE — ED PROVIDER NOTES
Encounter Date: 7/21/2023       History     Chief Complaint   Patient presents with    Dysphagia     Difficutly swallowing for a while, had a swallow evaluation, on Monday went to MD, dx with aspiration, referral to get PEG tube.      81 yo male with hx of HTN, GERD, hx of prostate/lung/espophageal cancer presents to ED for evaluation of difficulty swallowing. Family reports increasing trouble swallowing with aspiration of food and water. States this morning with speech therapy patient was unable to tolerate fluids. Patient has been following with Dr. Garcia at cancer center. Family states they have been trying to get patient set up for PEG placement.     The history is provided by the patient and a relative. No  was used.   Review of patient's allergies indicates:  No Known Allergies  Past Medical History:   Diagnosis Date    CAD in native artery     Dementia     DJD (degenerative joint disease)     GERD (gastroesophageal reflux disease)     Hallux valgus (acquired), unspecified foot     Hammer toe, acquired     Hx of cancer of lung     Hypercholesteremia     Hypertension     Lung cancer     Onychomycosis of toenail     PAD (peripheral artery disease)     PVD (peripheral vascular disease)     Tinea pedis     Tobacco user     Unspecified dementia without behavioral disturbance      Past Surgical History:   Procedure Laterality Date    BACK SURGERY      CORONARY STENT PLACEMENT      DEBRIDEMENT OF FOOT Left     ESOPHAGOGASTRODUODENOSCOPY N/A 08/02/2022    Procedure: EGD w/ dil;  Surgeon: Pollo Hairston MD;  Location: [facility] ENDOSCOPY;  Service: Gastroenterology;  Laterality: N/A;    gastrointestinal biopsy      TOE AMPUTATION Left      Family History   Problem Relation Age of Onset    Stroke Mother     Diabetes Father      Social History     Tobacco Use    Smoking status: Every Day     Packs/day: 1.00     Types: Cigarettes    Smokeless tobacco: Never   Substance Use Topics    Alcohol use:  Never    Drug use: Never     Review of Systems   Constitutional:  Negative for chills and fever.   HENT:  Negative for sore throat.    Respiratory:  Positive for choking. Negative for cough and shortness of breath.    Cardiovascular:  Negative for chest pain.   Gastrointestinal:  Negative for abdominal pain, nausea and vomiting.   Genitourinary:  Negative for dysuria.   Musculoskeletal:  Negative for back pain and myalgias.   Skin:  Negative for rash and wound.   Neurological:  Negative for weakness.   Hematological:  Does not bruise/bleed easily.     Physical Exam     Initial Vitals [07/21/23 1113]   BP Pulse Resp Temp SpO2   97/66 70 20 97.6 °F (36.4 °C) 99 %      MAP       --         Physical Exam    Nursing note and vitals reviewed.  Constitutional: He appears well-developed and well-nourished. He is cooperative.   HENT:   Head: Normocephalic and atraumatic.   Right Ear: Tympanic membrane and external ear normal.   Left Ear: Tympanic membrane and external ear normal.   Mouth/Throat: Uvula is midline, oropharynx is clear and moist and mucous membranes are normal. No trismus in the jaw. No uvula swelling.   Eyes: Conjunctivae are normal. Pupils are equal, round, and reactive to light.   Neck: Neck supple.   Normal range of motion.  Cardiovascular:  Normal rate, regular rhythm and normal heart sounds.           Pulmonary/Chest: Breath sounds normal. No respiratory distress. He has no wheezes. He has no rhonchi. He has no rales.   Abdominal: Abdomen is soft. Bowel sounds are normal. There is no abdominal tenderness.   Musculoskeletal:         General: Normal range of motion.      Cervical back: Normal range of motion and neck supple.     Neurological: He is alert and oriented to person, place, and time. He has normal strength. No cranial nerve deficit or sensory deficit. GCS score is 15. GCS eye subscore is 4. GCS verbal subscore is 5. GCS motor subscore is 6.   Skin: Skin is warm and dry. Capillary refill takes  less than 2 seconds.   Psychiatric: He has a normal mood and affect.       ED Course   Procedures  Labs Reviewed   COMPREHENSIVE METABOLIC PANEL - Abnormal; Notable for the following components:       Result Value    Creatinine 1.38 (*)     Albumin Level 3.2 (*)     Albumin/Globulin Ratio 0.9 (*)     All other components within normal limits   CBC WITH DIFFERENTIAL - Abnormal; Notable for the following components:    Hgb 12.8 (*)     Hct 40.7 (*)     MCV 74.5 (*)     MCH 23.4 (*)     MCHC 31.4 (*)     All other components within normal limits   CBC W/ AUTO DIFFERENTIAL    Narrative:     The following orders were created for panel order CBC Auto Differential.  Procedure                               Abnormality         Status                     ---------                               -----------         ------                     CBC with Differential[[id number]]        Abnormal            Final result                 Please view results for these tests on the individual orders.          Imaging Results              X-Ray Chest 1 View (Final result)  Result time 07/21/23 14:56:26      Final result by Rakesh Mena MD (07/21/23 14:56:26)                   Impression:      No acute cardiopulmonary process.      Electronically signed by: Rakesh Mena  Date:    07/21/2023  Time:    14:56               Narrative:    EXAMINATION:  XR CHEST 1 VIEW    CLINICAL HISTORY:  Food in respiratory tract, part unspecified causing other injury, initial encounter    TECHNIQUE:  Single view of the chest    COMPARISON:  02/24/2023    FINDINGS:  No focal opacification, pleural effusion, or pneumothorax.    The cardiomediastinal silhouette is within normal limits.    No acute osseous abnormality.                                       Medications   sodium chloride 0.9% flush 10 mL (has no administration in time range)   melatonin tablet 6 mg (has no administration in time range)   albuterol inhaler 2 puff (has no administration in  time range)   levETIRAcetam (Keppra) 500 mg in dextrose 5 % in water (D5W) 5 % 100 mL IVPB (MB+) (has no administration in time range)   hydrALAZINE injection 10 mg (has no administration in time range)   labetaloL injection 10 mg (has no administration in time range)   famotidine (PF) injection 20 mg (has no administration in time range)     Medical Decision Making:   Initial Assessment:   83 yo male with hx of HTN, GERD, hx of prostate/lung/esophageal cancer presents to ED for evaluation of difficulty swallowing. Family reports increasing trouble swallowing with aspiration of food and water. States this morning with speech therapy patient was unable to tolerate fluids. Patient has been following with Dr. Garcia at cancer center. Family states they have been trying to get patient set up for PEG placement.   Differential Diagnosis:   Aspiration, pneumonia, dysphagia  Clinical Tests:   Lab Tests: Ordered and Reviewed  Radiological Study: Ordered and Reviewed  ED Management:  83 yo make with hx of esophageal cancer presents to ED for evaluation of trouble swallowing. Patient has swallow study done showing high risk for aspiration. For the past 3 days has been unable to swallow food or liquids without aspiration. Attempting to have outpatient PEG placed but has been unable to follow up with GI. Labs unremarkable. CXR negative for pneumonia.  Discussed case with ED attending Dr. Yarbrough, he had face-to-face encounter with patient.  Discussed case hospital medicine will admit for further evaluation with GI consult and PEG tube placement                        Clinical Impression:   Final diagnoses:  [T17.928A] Aspiration of food        ED Disposition Condition    Admit                 CLAUDY Villagomez  07/21/23 8988

## 2023-07-22 NOTE — CONSULTS
Gastroenterology Consultation Note  82-year-old male patient of Dr. Hairston with a history of lung cancer, esophageal cancer and prostate cancer who has progressive anorexia cachexia and weight loss.  We will consulted for PEG placement.  The patient does admit to having some choking when he tries to eat.  His last EGD was almost a year ago.  The patient was independently seen and examined.  He is very cachectic but his abdomen is nontender.  I agree with the details of the history and physical as detailed below.  The patient will need EGD with possible PEG placement unless a stricture is found that could be dilated.  Reason for Consult:  The encounter diagnosis was Aspiration of food.    PCP:   Jamar Lenz DO    Referring MD:  Aaareferral Self  No address on file    Hospital Day: 1     Initial History of Present Illness (HPI):  This is a 82 y.o. male, pt of Dr. Pollo Hairston, with past medical history of lung cancer, esophageal cancer, and remote prostate cancer the.  Unclear if these are metastasis were  diagnoses with.  He has not followed by Oncology as an outpatient.  Never received chemotherapy.  He did complete 30 fractions of radiation therapy is still followed by the radiation oncologist here at [facility].  He presents to the ER on 07/21 with worsening swallowing.  He is now choking on all liquids and medications.    She states that they recently saw their primary care physician who was recommending PEG tube due to he did some better nutrition.  He appears cachectic but friend states that he still pretty active and is able to visit with family.  Denies any current pain.  Family and pt report that he would like the PEG tube to try to improve his quality of life. We are consulted for PEG placement    8-20-22 - EGD with dilitation    ROS:  Constitutional:  Negative for chills and fever.   HENT:  Negative for sore throat.    Respiratory:  Positive for choking. Negative for cough and  shortness of breath.    Cardiovascular:  Negative for chest pain.   Gastrointestinal:  Negative for abdominal pain, nausea and vomiting.   Genitourinary:  Negative for dysuria.   Musculoskeletal:  Negative for back pain and myalgias.   Skin:  Negative for rash and wound.   Neurological:  Negative for weakness.   Hematological:  Does not bruise/bleed easily.     Medical History:   Past Medical History:   Diagnosis Date    CAD in native artery     Dementia     DJD (degenerative joint disease)     GERD (gastroesophageal reflux disease)     Hallux valgus (acquired), unspecified foot     Hammer toe, acquired     Hx of cancer of lung     Hypercholesteremia     Hypertension     Lung cancer     Onychomycosis of toenail     PAD (peripheral artery disease)     PVD (peripheral vascular disease)     Tinea pedis     Tobacco user     Unspecified dementia without behavioral disturbance        Surgical History:   Past Surgical History:   Procedure Laterality Date    BACK SURGERY      CORONARY STENT PLACEMENT      DEBRIDEMENT OF FOOT Left     ESOPHAGOGASTRODUODENOSCOPY N/A 08/02/2022    Procedure: EGD w/ dil;  Surgeon: Pollo Hairston MD;  Location: [facility] ENDOSCOPY;  Service: Gastroenterology;  Laterality: N/A;    gastrointestinal biopsy      TOE AMPUTATION Left        Family History:   Family History   Problem Relation Age of Onset    Stroke Mother     Diabetes Father    .     Social History:   Social History     Tobacco Use    Smoking status: Every Day     Packs/day: 1.00     Types: Cigarettes    Smokeless tobacco: Never   Substance Use Topics    Alcohol use: Never       Allergies:  Review of patient's allergies indicates:  No Known Allergies    Medications Prior to Admission   Medication Sig Dispense Refill Last Dose    albuterol (PROVENTIL/VENTOLIN HFA) 90 mcg/actuation inhaler Inhale 2 puffs into the lungs every 6 (six) hours as needed for Wheezing. Rescue       amLODIPine (NORVASC) 5 MG tablet Take 5 mg by mouth once  daily.       atorvastatin (LIPITOR) 20 MG tablet Take 20 mg by mouth once daily.       carvediloL (COREG) 3.125 MG tablet Take 3.125 mg by mouth 2 (two) times daily with meals.       clopidogreL (PLAVIX) 75 mg tablet Take 75 mg by mouth once daily.       donepeziL (ARICEPT) 10 MG tablet Take 10 mg by mouth every evening.       fluconazole (DIFLUCAN) 100 MG tablet Take 100 mg by mouth once daily.       fluticasone propionate (FLOVENT HFA) 220 mcg/actuation inhaler Inhale into the lungs 2 (two) times daily. Controller       levETIRAcetam (KEPPRA) 500 MG Tab Take 1 tablet (500 mg total) by mouth 2 (two) times daily. 60 tablet 3     omeprazole (PRILOSEC) 20 MG capsule Take by mouth once daily.       sacubitriL-valsartan (ENTRESTO) 49-51 mg per tablet Take 1 tablet by mouth 2 (two) times daily.            Objective Findings:    Vital Signs:  /83   Pulse 68   Temp 98 °F (36.7 °C) (Oral)   Resp 14   Wt 54.4 kg (120 lb)   SpO2 98%   BMI 19.97 kg/m²   Body mass index is 19.97 kg/m².    Physical Exam:  Nursing note and vitals reviewed.  Constitutional: He appears well-developed and well-nourished. He is cooperative.   HENT:   Head: Normocephalic and atraumatic.   Right Ear: Tympanic membrane and external ear normal.   Left Ear: Tympanic membrane and external ear normal.   Mouth/Throat: Uvula is midline, oropharynx is clear and moist and mucous membranes are normal. No trismus in the jaw. No uvula swelling.   Eyes: Conjunctivae are normal. Pupils are equal, round, and reactive to light.   Neck: Neck supple.   Normal range of motion.  Cardiovascular:  Normal rate, regular rhythm and normal heart sounds.           Pulmonary/Chest: Breath sounds normal. No respiratory distress. He has no wheezes. He has no rhonchi. He has no rales.   Abdominal: Abdomen is soft. Bowel sounds are normal. There is no abdominal tenderness.   Musculoskeletal:         General: Normal range of motion.      Cervical back: Normal range of  motion and neck supple.      Neurological: He is alert and oriented to person, place, and time. He has normal strength. No cranial nerve deficit or sensory deficit. GCS score is 15. GCS eye subscore is 4. GCS verbal subscore is 5. GCS motor subscore is 6.   Skin: Skin is warm and dry. Capillary refill takes less than 2 seconds.   Psychiatric: He has a normal mood and affect.     Labs:  Recent Results (from the past 48 hour(s))   Comprehensive Metabolic Panel    Collection Time: 07/21/23 11:25 AM   Result Value Ref Range    Sodium Level 142 136 - 145 mmol/L    Potassium Level 4.2 3.5 - 5.1 mmol/L    Chloride 106 98 - 107 mmol/L    Carbon Dioxide 27 23 - 31 mmol/L    Glucose Level 105 82 - 115 mg/dL    Blood Urea Nitrogen 22.6 8.4 - 25.7 mg/dL    Creatinine 1.38 (H) 0.73 - 1.18 mg/dL    Calcium Level Total 9.3 8.8 - 10.0 mg/dL    Protein Total 6.6 5.8 - 7.6 gm/dL    Albumin Level 3.2 (L) 3.4 - 4.8 g/dL    Globulin 3.4 2.4 - 3.5 gm/dL    Albumin/Globulin Ratio 0.9 (L) 1.1 - 2.0 ratio    Bilirubin Total 0.6 <=1.5 mg/dL    Alkaline Phosphatase 72 40 - 150 unit/L    Alanine Aminotransferase 9 0 - 55 unit/L    Aspartate Aminotransferase 16 5 - 34 unit/L    eGFR 51 mls/min/1.73/m2   CBC with Differential    Collection Time: 07/21/23 11:25 AM   Result Value Ref Range    WBC 4.76 4.50 - 11.50 x10(3)/mcL    RBC 5.46 4.70 - 6.10 x10(6)/mcL    Hgb 12.8 (L) 14.0 - 18.0 g/dL    Hct 40.7 (L) 42.0 - 52.0 %    MCV 74.5 (L) 80.0 - 94.0 fL    MCH 23.4 (L) 27.0 - 31.0 pg    MCHC 31.4 (L) 33.0 - 36.0 g/dL    RDW 14.6 11.5 - 17.0 %    Platelet 230 130 - 400 x10(3)/mcL    MPV 10.4 7.4 - 10.4 fL    Neut % 71.1 %    Lymph % 12.8 %    Mono % 12.6 %    Eos % 2.5 %    Basophil % 0.4 %    Lymph # 0.61 0.6 - 4.6 x10(3)/mcL    Neut # 3.38 2.1 - 9.2 x10(3)/mcL    Mono # 0.60 0.1 - 1.3 x10(3)/mcL    Eos # 0.12 0 - 0.9 x10(3)/mcL    Baso # 0.02 <=0.2 x10(3)/mcL    IG# 0.03 0 - 0.04 x10(3)/mcL    IG% 0.6 %    NRBC% 0.0 %   Basic Metabolic Panel     Collection Time: 07/22/23  4:06 AM   Result Value Ref Range    Sodium Level 139 136 - 145 mmol/L    Potassium Level 3.7 3.5 - 5.1 mmol/L    Chloride 107 98 - 107 mmol/L    Carbon Dioxide 25 23 - 31 mmol/L    Glucose Level 108 82 - 115 mg/dL    Blood Urea Nitrogen 20.9 8.4 - 25.7 mg/dL    Creatinine 0.91 0.73 - 1.18 mg/dL    BUN/Creatinine Ratio 23     Calcium Level Total 8.6 (L) 8.8 - 10.0 mg/dL    Anion Gap 7.0 mEq/L    eGFR >60 mls/min/1.73/m2   CBC with Differential    Collection Time: 07/22/23  4:06 AM   Result Value Ref Range    WBC 4.64 4.50 - 11.50 x10(3)/mcL    RBC 4.98 4.70 - 6.10 x10(6)/mcL    Hgb 11.6 (L) 14.0 - 18.0 g/dL    Hct 37.3 (L) 42.0 - 52.0 %    MCV 74.9 (L) 80.0 - 94.0 fL    MCH 23.3 (L) 27.0 - 31.0 pg    MCHC 31.1 (L) 33.0 - 36.0 g/dL    RDW 14.4 11.5 - 17.0 %    Platelet 197 130 - 400 x10(3)/mcL    MPV 10.2 7.4 - 10.4 fL    Neut % 61.1 %    Lymph % 15.7 %    Mono % 18.5 %    Eos % 4.1 %    Basophil % 0.4 %    Lymph # 0.73 0.6 - 4.6 x10(3)/mcL    Neut # 2.83 2.1 - 9.2 x10(3)/mcL    Mono # 0.86 0.1 - 1.3 x10(3)/mcL    Eos # 0.19 0 - 0.9 x10(3)/mcL    Baso # 0.02 <=0.2 x10(3)/mcL    IG# 0.01 0 - 0.04 x10(3)/mcL    IG% 0.2 %    NRBC% 0.0 %       X-Ray Chest 1 View   Final Result      No acute cardiopulmonary process.         Electronically signed by: Rakesh Mena   Date:    07/21/2023   Time:    14:56          Imaging:reviewed    Endoscopy:  reviewed      Assessment/Plan:  Oral pahryngeal dysphagia s/p rx for esophageal cancer  Hx of prostate and lung Ca  Cachexia    NPO after midnight Sunday for PEG placement Monday    Thank you for allowing us to participate in the care of Ezequiel Merchant.    Keesha Bourgeois NP acting as scribe for Dr. Nora Nicole

## 2023-07-22 NOTE — NURSING
Nurses Note -- 4 Eyes      7/21/2023 2130      Skin assessed during: Admit      [x] No Altered Skin Integrity Present    [x]Prevention Measures Documented      [] Yes- Altered Skin Integrity Present or Discovered   [] LDA Added if Not in Epic (Describe Wound)   [] New Altered Skin Integrity was Present on Admit and Documented in LDA   [] Wound Image Taken    Wound Care Consulted? No    Attending Nurse:  Jose Manuel Og RN     Second RN/Staff Member:  DASHA Bowers

## 2023-07-22 NOTE — PROGRESS NOTES
[facility]  Hospital Medicine Progress Note        Chief Complaint: Inpatient Follow-up     HPI:   82-year-old male with past medical history of lung cancer, esophageal cancer, and remote prostate cancer the.  Unclear if these are metastasis were  diagnoses with.  He has not followed by Oncology as an outpatient.  Never received chemotherapy.  He did complete 30 fractions of radiation therapy is still followed by the radiation oncologist here at [facility].  He presents to the ER on 07/21 with worsening swallowing.  He is now choking on all liquids and medications.  The is a family friend bedside he states that they have seen GI in the past and has had to have an esophageal dilation.  She thinks it was about a year ago.  On review of the chart it looks like he was in August of 2022.  She states that they recently saw their primary care physician who was recommending PEG tube due to he did some better nutrition.  He appears cachectic but friend states that he still pretty active and is able to visit with family.  Denies any current pain.  On conversations of his goals of care they both state that they have never considered hospice.  He did have palliative care visit with them about a year ago but decided that he was not ready for it.  They are more open to it at this point.  They both report that he would like the PEG tube to try to improve his quality of life.  We discussed code status.  They have not made any considerations in this area.  Will remain full code for now but will continues conversations through the weekend.    Interval Hx:  GI already saw this morning.  Planning on PEG tube placement on Monday.  Patient doing okay.  On Clinimix for nutrition.  NPO.  We had further conversations regarding code status and long-term plan of care.  They have not made any further decisions.  They do want to speak further with [facility] about possibly discharging with  palliative care and then transitioned to hospice at a later point.  Placed a consult Case Management    Objective/physical exam:  General: In no acute distress, afebrile  Chest: Clear to auscultation bilaterally  Heart: RRR, +S1, S2, no appreciable murmur  Abdomen: Soft, nontender, BS +  MSK: Warm, no lower extremity edema, no clubbing or cyanosis  Neurologic: Alert and oriented x4, Cranial nerve II-XII intact, Strength 5/5 in all 4 extremities    VITAL SIGNS: 24 HRS MIN & MAX LAST   Temp  Min: 97.6 °F (36.4 °C)  Max: 98.2 °F (36.8 °C) 97.9 °F (36.6 °C)   BP  Min: 97/66  Max: 165/95 126/75   Pulse  Min: 63  Max: 97  63   Resp  Min: 18  Max: 20 18   SpO2  Min: 95 %  Max: 99 % 98 %       Recent Labs   Lab 07/21/23  1125 07/22/23  0406   WBC 4.76 4.64   RBC 5.46 4.98   HGB 12.8* 11.6*   HCT 40.7* 37.3*   MCV 74.5* 74.9*   MCH 23.4* 23.3*   MCHC 31.4* 31.1*   RDW 14.6 14.4    197   MPV 10.4 10.2       Recent Labs   Lab 07/21/23  1125 07/22/23  0406    139   K 4.2 3.7   CO2 27 25   BUN 22.6 20.9   CREATININE 1.38* 0.91   CALCIUM 9.3 8.6*   ALBUMIN 3.2*  --    ALKPHOS 72  --    ALT 9  --    AST 16  --    BILITOT 0.6  --           Microbiology Results (last 7 days)       ** No results found for the last 168 hours. **             See below for Radiology    Scheduled Med:   famotidine (PF)  20 mg Intravenous Daily    levETIRAcetam (Keppra) IV (PEDS and ADULTS)  500 mg Intravenous Q12H        Continuous Infusions:   Amino acid 4.25% - dextrose 5% (CLINIMIX-E) solution (1L provides 42.5 gm AA, 50 gm CHO (170 kcal/L dextrose), Na 35, K 30, Mg 5, Ca 4.5, Acetate 70, Cl 39, Phos 15) 75 mL/hr at 07/21/23 1841        PRN Meds:  albuterol, hydrALAZINE, labetalol, melatonin, sodium chloride 0.9%       Assessment/Plan:   Esophageal cancer with obstructing mass   Severe protein calorie malnutrition  Oropharyngeal dysphagia   Anemia of malignancy  History of:  Lung cancer, prostate cancer, hypertension, seizure disorder,  dementia, coronary artery disease, chronic systolic heart failure      PEG tube Monday  Continue Clinimix.  NPO.    IV K[facility]  Hospice informational visit today if rec is available.  Family requesting [place]  Labs stable this morning.  Repeat Monday      All diagnosis and differential diagnosis have been reviewed; assessment and plan has been documented; I have personally reviewed the labs and test results that are presently available; I have reviewed the patients medication list; I have reviewed the consulting providers response and recommendations. I have reviewed or attempted to review medical records based upon their availability    All of the patient's questions have been  addressed and answered. Patient's is agreeable to the above stated plan. I will continue to monitor closely and make adjustments to medical management as needed.  _____________________________________________________________________      Radiology:  X-Ray Chest 1 View  Narrative: EXAMINATION:  XR CHEST 1 VIEW    CLINICAL HISTORY:  Food in respiratory tract, part unspecified causing other injury, initial encounter    TECHNIQUE:  Single view of the chest    COMPARISON:  02/24/2023    FINDINGS:  No focal opacification, pleural effusion, or pneumothorax.    The cardiomediastinal silhouette is within normal limits.    No acute osseous abnormality.  Impression: No acute cardiopulmonary process.    Electronically signed by: Rakesh Mena  Date:    07/21/2023  Time:    14:56      Eugene Leo MD   07/22/2023

## 2023-07-23 NOTE — PLAN OF CARE
Problem: Adult Inpatient Plan of Care  Goal: Plan of Care Review  Outcome: Ongoing, Progressing  Flowsheets (Taken 7/23/2023 0039)  Plan of Care Reviewed With:   patient   spouse  Goal: Patient-Specific Goal (Individualized)  Outcome: Ongoing, Progressing  Flowsheets (Taken 7/23/2023 0039)  Anxieties, Fears or Concerns: none  Individualized Care Needs: monitor labs  Goal: Absence of Hospital-Acquired Illness or Injury  Outcome: Ongoing, Progressing  Intervention: Identify and Manage Fall Risk  Flowsheets (Taken 7/23/2023 0039)  Safety Promotion/Fall Prevention:   assistive device/personal item within reach   nonskid shoes/socks when out of bed  Intervention: Prevent Skin Injury  Flowsheets (Taken 7/23/2023 0039)  Body Position: position changed independently  Skin Protection: adhesive use limited  Intervention: Prevent and Manage VTE (Venous Thromboembolism) Risk  Flowsheets (Taken 7/23/2023 0039)  Activity Management: Rolling - L1  VTE Prevention/Management: remove, assess skin, and reapply sequential compression device  Range of Motion: active ROM (range of motion) encouraged  Goal: Optimal Comfort and Wellbeing  Outcome: Ongoing, Progressing  Intervention: Monitor Pain and Promote Comfort  Flowsheets (Taken 7/23/2023 0039)  Pain Management Interventions: care clustered  Intervention: Provide Person-Centered Care  Flowsheets (Taken 7/23/2023 0039)  Trust Relationship/Rapport:   care explained   choices provided   emotional support provided   empathic listening provided   questions answered   thoughts/feelings acknowledged   reassurance provided   questions encouraged  Goal: Readiness for Transition of Care  Outcome: Ongoing, Progressing     Problem: Fall Injury Risk  Goal: Absence of Fall and Fall-Related Injury  Outcome: Ongoing, Progressing  Intervention: Identify and Manage Contributors  Flowsheets (Taken 7/23/2023 0039)  Self-Care Promotion: BADL personal objects within reach  Medication Review/Management:  medications reviewed  Intervention: Promote Injury-Free Environment  Flowsheets (Taken 7/23/2023 0039)  Safety Promotion/Fall Prevention:   assistive device/personal item within reach   nonskid shoes/socks when out of bed

## 2023-07-23 NOTE — PLAN OF CARE
Problem: Adult Inpatient Plan of Care  Goal: Plan of Care Review  Outcome: Ongoing, Progressing  Goal: Patient-Specific Goal (Individualized)  Outcome: Ongoing, Progressing  Goal: Absence of Hospital-Acquired Illness or Injury  Outcome: Ongoing, Progressing  Intervention: Identify and Manage Fall Risk  Flowsheets (Taken 7/23/2023 0933)  Safety Promotion/Fall Prevention:   assistive device/personal item within reach   nonskid shoes/socks when out of bed   instructed to call staff for mobility   medications reviewed  Intervention: Prevent Skin Injury  Flowsheets (Taken 7/23/2023 0933)  Body Position: position changed independently  Skin Protection: adhesive use limited  Intervention: Prevent and Manage VTE (Venous Thromboembolism) Risk  Flowsheets (Taken 7/23/2023 0933)  VTE Prevention/Management:   ambulation promoted   intravenous hydration  Range of Motion: active ROM (range of motion) encouraged  Goal: Optimal Comfort and Wellbeing  Outcome: Ongoing, Progressing  Intervention: Monitor Pain and Promote Comfort  Flowsheets (Taken 7/23/2023 0933)  Pain Management Interventions:   care clustered   quiet environment facilitated  Goal: Readiness for Transition of Care  Outcome: Ongoing, Progressing     Problem: Fall Injury Risk  Goal: Absence of Fall and Fall-Related Injury  Outcome: Ongoing, Progressing

## 2023-07-23 NOTE — PROGRESS NOTES
[facility]  Hospital Medicine Progress Note        Chief Complaint: Inpatient Follow-up     HPI:   82-year-old male with past medical history of lung cancer, esophageal cancer, and remote prostate cancer the.  Unclear if these are metastasis were  diagnoses with.  He has not followed by Oncology as an outpatient.  Never received chemotherapy.  He did complete 30 fractions of radiation therapy is still followed by the radiation oncologist here at [facility].  He presents to the ER on 07/21 with worsening swallowing.  He is now choking on all liquids and medications.  The is a family friend bedside he states that they have seen GI in the past and has had to have an esophageal dilation.  She thinks it was about a year ago.  On review of the chart it looks like he was in August of 2022.  She states that they recently saw their primary care physician who was recommending PEG tube due to he did some better nutrition.  He appears cachectic but friend states that he still pretty active and is able to visit with family.  Denies any current pain.  On conversations of his goals of care they both state that they have never considered hospice.  He did have palliative care visit with them about a year ago but decided that he was not ready for it.  They are more open to it at this point.  They both report that he would like the PEG tube to try to improve his quality of life.  We discussed code status.  They have not made any considerations in this area.  Will remain full code for now but will continues conversations through the weekend.     Interval Hx:  No changes overnight.  PEG planned for tomorrow.  Ambulating with assistance but very week.      Objective/physical exam:  General: In no acute distress, afebrile  Chest: Clear to auscultation bilaterally  Heart: RRR, +S1, S2, no appreciable murmur  Abdomen: Soft, nontender, BS +  MSK: Warm, no lower extremity edema, no clubbing or  cyanosis  Neurologic: Alert and oriented x4, Cranial nerve II-XII intact, Strength 5/5 in all 4 extremities    VITAL SIGNS: 24 HRS MIN & MAX LAST   Temp  Min: 97.6 °F (36.4 °C)  Max: 98.4 °F (36.9 °C) 97.6 °F (36.4 °C)   BP  Min: 127/79  Max: 153/78 127/79   Pulse  Min: 67  Max: 72  71   Resp  Min: 14  Max: 20 20   SpO2  Min: 96 %  Max: 98 % 98 %       Recent Labs   Lab 07/21/23  1125 07/22/23  0406   WBC 4.76 4.64   RBC 5.46 4.98   HGB 12.8* 11.6*   HCT 40.7* 37.3*   MCV 74.5* 74.9*   MCH 23.4* 23.3*   MCHC 31.4* 31.1*   RDW 14.6 14.4    197   MPV 10.4 10.2       Recent Labs   Lab 07/21/23  1125 07/22/23  0406    139   K 4.2 3.7   CO2 27 25   BUN 22.6 20.9   CREATININE 1.38* 0.91   CALCIUM 9.3 8.6*   ALBUMIN 3.2*  --    ALKPHOS 72  --    ALT 9  --    AST 16  --    BILITOT 0.6  --           Microbiology Results (last 7 days)       ** No results found for the last 168 hours. **             See below for Radiology    Scheduled Med:   famotidine (PF)  20 mg Intravenous Daily    levETIRAcetam (Keppra) IV (PEDS and ADULTS)  500 mg Intravenous Q12H        Continuous Infusions:   Amino acid 4.25% - dextrose 5% (CLINIMIX-E) solution (1L provides 42.5 gm AA, 50 gm CHO (170 kcal/L dextrose), Na 35, K 30, Mg 5, Ca 4.5, Acetate 70, Cl 39, Phos 15) 75 mL/hr at 07/23/23 0121        PRN Meds:  albuterol, hydrALAZINE, labetalol, melatonin, sodium chloride 0.9%       Assessment/Plan:   Esophageal cancer with obstructing mass   Severe protein calorie malnutrition  Oropharyngeal dysphagia   AMNA  IVVD  Anemia of malignancy  History of:  Lung cancer, prostate cancer, hypertension, seizure disorder, dementia, coronary artery disease, chronic systolic heart failure      PEG tube Monday  Continue Clinimix.  NPO.    IV Keppra  Renal injury resolved  Family requesting meeting with [facility]. CM consult placed  Labs in am      All diagnosis and differential diagnosis have been reviewed; assessment and plan has been  documented; I have personally reviewed the labs and test results that are presently available; I have reviewed the patients medication list; I have reviewed the consulting providers response and recommendations. I have reviewed or attempted to review medical records based upon their availability    All of the patient's questions have been  addressed and answered. Patient's is agreeable to the above stated plan. I will continue to monitor closely and make adjustments to medical management as needed.  _____________________________________________________________________      Radiology:  X-Ray Chest 1 View  Narrative: EXAMINATION:  XR CHEST 1 VIEW    CLINICAL HISTORY:  Food in respiratory tract, part unspecified causing other injury, initial encounter    TECHNIQUE:  Single view of the chest    COMPARISON:  02/24/2023    FINDINGS:  No focal opacification, pleural effusion, or pneumothorax.    The cardiomediastinal silhouette is within normal limits.    No acute osseous abnormality.  Impression: No acute cardiopulmonary process.    Electronically signed by: Rakesh Mena  Date:    07/21/2023  Time:    14:56      Eugene Leo MD   07/23/2023

## 2023-07-24 NOTE — PROGRESS NOTES
[facility]  Hospital Medicine Progress Note        Chief Complaint: Inpatient Follow-up     HPI:   82-year-old male with past medical history of lung cancer, esophageal cancer, and remote prostate cancer the.  Unclear if these are metastasis were  diagnoses with.  He has not followed by Oncology as an outpatient.  Never received chemotherapy.  He did complete 30 fractions of radiation therapy is still followed by the radiation oncologist here at [facility].  He presents to the ER on 07/21 with worsening swallowing.  He is now choking on all liquids and medications.  The is a family friend bedside he states that they have seen GI in the past and has had to have an esophageal dilation.  She thinks it was about a year ago.  On review of the chart it looks like he was in August of 2022.  She states that they recently saw their primary care physician who was recommending PEG tube due to he did some better nutrition.  He appears cachectic but friend states that he still pretty active and is able to visit with family.  Denies any current pain.  On conversations of his goals of care they both state that they have never considered hospice.  He did have palliative care visit with them about a year ago but decided that he was not ready for it.  They are more open to it at this point.  They both report that he would like the PEG tube to try to improve his quality of life.  We discussed code status.  They have not made any considerations in this area.  Will remain full code for now but will continues conversations through the weekend.     Interval Hx:  NPO for PEG tube placement this morning.  No new complaints.     Objective/physical exam:  General: In no acute distress, afebrile  Chest: Clear to auscultation bilaterally  Heart: RRR, +S1, S2, no appreciable murmur  Abdomen: Soft, nontender, BS +  MSK: Warm, no lower extremity edema, no clubbing or cyanosis  Neurologic: Alert and  oriented x4, Cranial nerve II-XII intact, Strength 5/5 in all 4 extremities     VITAL SIGNS: 24 HRS MIN & MAX LAST   Temp  Min: 97.4 °F (36.3 °C)  Max: 98.6 °F (37 °C) 98.2 °F (36.8 °C)   BP  Min: 111/70  Max: 160/88 111/70   Pulse  Min: 67  Max: 75  75   Resp  Min: 16  Max: 20 18   SpO2  Min: 94 %  Max: 98 % 97 %       Recent Labs   Lab 07/21/23  1125 07/22/23  0406 07/24/23  0322   WBC 4.76 4.64 3.94*   RBC 5.46 4.98 5.28   HGB 12.8* 11.6* 12.3*   HCT 40.7* 37.3* 39.1*   MCV 74.5* 74.9* 74.1*   MCH 23.4* 23.3* 23.3*   MCHC 31.4* 31.1* 31.5*   RDW 14.6 14.4 14.1    197 241   MPV 10.4 10.2 10.5*       Recent Labs   Lab 07/21/23  1125 07/22/23  0406 07/24/23  0322    139 137   K 4.2 3.7 4.2   CO2 27 25 23   BUN 22.6 20.9 20.8   CREATININE 1.38* 0.91 1.00   CALCIUM 9.3 8.6* 8.4*   ALBUMIN 3.2*  --   --    ALKPHOS 72  --   --    ALT 9  --   --    AST 16  --   --    BILITOT 0.6  --   --           Microbiology Results (last 7 days)       ** No results found for the last 168 hours. **             See below for Radiology    Scheduled Med:   famotidine (PF)  20 mg Intravenous Daily    levETIRAcetam (Keppra) IV (PEDS and ADULTS)  500 mg Intravenous Q12H        Continuous Infusions:       PRN Meds:  albuterol, hydrALAZINE, labetalol, melatonin, sodium chloride 0.9%       Assessment/Plan:   Esophageal cancer with obstructing mass   Severe protein calorie malnutrition  Oropharyngeal dysphagia   AMNA  IVVD  Anemia of malignancy  History of:  Lung cancer, prostate cancer, hypertension, seizure disorder, dementia, coronary artery disease, chronic systolic heart failure      PEG today.    Can DC Clinimix this afternoon.    Will need to transition off his oral medications to PEG formulations.    Also changes Keppra over  Wife is at the bedside and asking about what assistance she will be getting at home.  A consult has been placed to case management about looking into hospice care.  Likely start trickle feeds tomorrow  and then could potentially be discharged on Wednesday  Family requesting meeting with [facility] [place]. CM consult placed          All diagnosis and differential diagnosis have been reviewed; assessment and plan has been documented; I have personally reviewed the labs and test results that are presently available; I have reviewed the patients medication list; I have reviewed the consulting providers response and recommendations. I have reviewed or attempted to review medical records based upon their availability    All of the patient's questions have been  addressed and answered. Patient's is agreeable to the above stated plan. I will continue to monitor closely and make adjustments to medical management as needed.  _____________________________________________________________________      Radiology:  X-Ray Chest 1 View  Narrative: EXAMINATION:  XR CHEST 1 VIEW    CLINICAL HISTORY:  Food in respiratory tract, part unspecified causing other injury, initial encounter    TECHNIQUE:  Single view of the chest    COMPARISON:  02/24/2023    FINDINGS:  No focal opacification, pleural effusion, or pneumothorax.    The cardiomediastinal silhouette is within normal limits.    No acute osseous abnormality.  Impression: No acute cardiopulmonary process.    Electronically signed by: Rakesh Mena  Date:    07/21/2023  Time:    14:56      Eugene Leo MD   07/24/2023

## 2023-07-24 NOTE — ANESTHESIA PREPROCEDURE EVALUATION
07/24/2023  Ezequiel Merchant is a 82 y.o., male with history of CAD status post stent (EF 40%) and lung/esophageal/prostate cancer with dementia admitted July 21st with a chief complaint of dysphagia and history of aspiration.  Patient presents today for EGD with PEG    Transthoracic echo October 2022   EF 40-45%  Mild-to-moderate AI  No evidence of intracardiac shunt    Pre-op Assessment    I have reviewed the Patient Summary Reports.    I have reviewed the NPO Status.   I have reviewed the Medications.     Review of Systems  Anesthesia Hx:   Denies Personal Hx of Anesthesia complications.   Social:  Smoker    Hematology/Oncology:         Esophageal, Lung and Prostate Current/Recent Cancer.   Cardiovascular:   Hypertension  Functional Capacity good / => 4 METS  Coronary Artery Disease: S/P Percutaneous Coronary Intervention (PCI)    Hepatic/GI:   GERD        Physical Exam  General: Well nourished, Cooperative, Alert and Oriented    Airway:  Mallampati: I   Mouth Opening: Normal  TM Distance: Normal  Tongue: Normal  Neck ROM: Normal ROM    Dental:  Edentulous    Chest/Lungs:  Clear to auscultation, Normal Respiratory Rate    Heart:  Rate: Normal  Rhythm: Regular Rhythm        Anesthesia Plan  Type of Anesthesia, risks & benefits discussed:    Anesthesia Type: Gen Natural Airway  Intra-op Monitoring Plan: Standard ASA Monitors  Induction:  IV  Informed Consent: Informed consent signed with the Patient and all parties understand the risks and agree with anesthesia plan.  All questions answered.   ASA Score: 3  Day of Surgery Review of History & Physical: H&P Update referred to the surgeon/provider.    Ready For Surgery From Anesthesia Perspective.     .

## 2023-07-24 NOTE — PROVATION PATIENT INSTRUCTIONS
Discharge Summary/Instructions after an Endoscopic Procedure  Patient Name: Ezequiel Merchant  Patient MRN: [id number]  Patient YOB: 1940 Monday, July 24, 2023  Branden Ruffin MD  Dear patient,  As a result of recent federal legislation (The Federal Cures Act), you may   receive lab or pathology results from your procedure in your [facility]   account before your physician is able to contact you. Your physician or   their representative will relay the results to you with their   recommendations at their soonest availability.  Thank you,  RESTRICTIONS:  During your procedure today, you received medications for sedation.  These   medications may affect your judgment, balance and coordination.  Therefore,   for 24 hours, you have the following restrictions:   - DO NOT drive a car, operate machinery, make legal/financial decisions,   sign important papers or drink alcohol.    ACTIVITY:  Today: no heavy lifting, straining or running due to procedural   sedation/anesthesia.  The following day: return to full activity including work.  DIET:  Eat and drink normally unless instructed otherwise.     TREATMENT FOR COMMON SIDE EFFECTS:  - Mild abdominal pain, nausea, belching, bloating or excessive gas:  rest,   eat lightly and use a heating pad.  - Sore Throat: treat with throat lozenges and/or gargle with warm salt   water.  - Because air was used during the procedure, expelling large amounts of air   from your rectum or belching is normal.  - If a bowel prep was taken, you may not have a bowel movement for 1-3 days.    This is normal.  SYMPTOMS TO WATCH FOR AND REPORT TO YOUR PHYSICIAN:  1. Abdominal pain or bloating, other than gas cramps.  2. Chest pain.  3. Back pain.  4. Signs of infection such as: chills or fever occurring within 24 hours   after the procedure.  5. Rectal bleeding, which would show as bright red, maroon, or black stools.   (A tablespoon of blood from the rectum is not serious, especially  if   hemorrhoids are present.)  6. Vomiting.  7. Weakness or dizziness.  GO DIRECTLY TO THE NEAREST EMERGENCY ROOM IF YOU HAVE ANY OF THE FOLLOWING:      Difficulty breathing              Chills and/or fever over 101 F   Persistent vomiting and/or vomiting blood   Severe abdominal pain   Severe chest pain   Black, tarry stools   Bleeding- more than one tablespoon   Any other symptom or condition that you feel may need urgent attention  Your doctor recommends these additional instructions:  If any biopsies were taken, your doctors clinic will contact you in 1 to 2   weeks with any results.  - [facility] to [facility].   - Clear liquid diet today and would not advance past full liquid diet until   repeat dilation can be performed.   - Resume Plavix (clopidogrel) at prior dose tomorrow.   - Observe patient's clinical course.   - Repeat upper endoscopy in 3-4 weeks for further dilation consideration   with primary GI, would ideally have plavix held for 5 days prior.   - The findings and recommendations were discussed with the patient.  For questions, problems or results please call your physician - Branden Ruffin MD at Work:  [phone].  [facility], EMERGENCY ROOM PHONE NUMBER: [phone]  IF A COMPLICATION OR EMERGENCY SITUATION ARISES AND YOU ARE UNABLE TO REACH   YOUR PHYSICIAN - GO DIRECTLY TO THE EMERGENCY ROOM.  MD Branden Che MD  7/24/2023 1:29:49 PM  This report has been verified and signed electronically.  Dear patient,  As a result of recent federal legislation (The Federal Cures Act), you may   receive lab or pathology results from your procedure in your [facility]   account before your physician is able to contact you. Your physician or   their representative will relay the results to you with their   recommendations at their soonest availability.  Thank you,  PROVATION

## 2023-07-24 NOTE — PLAN OF CARE
07/24/23 1155   Discharge Assessment   Assessment Type Discharge Planning Assessment   Confirmed/corrected address, phone number and insurance Yes   Confirmed Demographics Correct on Facesheet   Source of Information patient   When was your last doctors appointment?   (Patient's significant otherPatience reports last month.)   Communicated MOHAN with patient/caregiver Yes   Reason For Admission Aspiration of Food   Do you expect to return to your current living situation? Yes   Do you have help at home or someone to help you manage your care at home? Yes   Who are your caregiver(s) and their phone number(s)? Significant Other: Patience Peck: [phone]   Prior to hospitilization cognitive status: Unable to Assess   Current cognitive status: Alert/Oriented   Walking or Climbing Stairs ambulation difficulty, requires equipment;ambulation difficulty, assistance 1 person   Dressing/Bathing bathing difficulty, assistance 1 person   Home Accessibility wheelchair accessible  (Ramp available at home.)   Home Layout Able to live on 1st floor   Equipment Currently Used at Home cane, straight;walker, rolling   Readmission within 30 days? No   Patient currently being followed by outpatient case management? No   Do you currently have service(s) that help you manage your care at home? Yes   Name and Contact number of agency [facility]   Is the pt/caregiver preference to resume services with current agency Yes   Do you take prescription medications? Yes   Do you have prescription coverage? Yes   Coverage [employer] Managed Medicare- [facility] Dual Complete   Do you have any problems affording any of your prescribed medications? No   Is the patient taking medications as prescribed? yes   Who is going to help you get home at discharge? Significant Other, Patience Beaulieubishop   How do you get to doctors appointments? family or friend will provide   Are you on dialysis? No   Do you take coumadin? No  (Plavix)   Discharge Plan A  Home with family   Discharge Plan B Home Health   DME Needed Upon Discharge  none   Discharge Plan discussed with: Spouse/sig other   Name(s) and Number(s) Significant Other: Patience Peck: [phone]   Transition of Care Barriers None       Patient's PCP is Dr. Jamar Lenz.  Patient is active with [facility].  SW discussed hospice given the referral receive however, patient's significant other, Patience denied hospice care at this time. SW will attempt to meet with patient after his procedure today.  No barriers to discharge at this time.

## 2023-07-24 NOTE — ANESTHESIA POSTPROCEDURE EVALUATION
Anesthesia Post Evaluation    Patient: Ezequiel Merchant    Procedure(s) Performed: Procedure(s):  EGD, WITH CLOSED BIOPSY  EGD, WITH BALLOON DILATION OF LESS THAN 30 MILLIMETERS    Final Anesthesia Type: general      Patient location during evaluation: GI PACU  Patient participation: Yes- Able to Participate  Level of consciousness: awake and alert  Post-procedure vital signs: reviewed and stable  Pain management: adequate  Airway patency: patent    PONV status at discharge: No PONV  Anesthetic complications: no      Cardiovascular status: blood pressure returned to baseline  Respiratory status: spontaneous ventilation and room air  Hydration status: euvolemic  Follow-up not needed.          Vitals Value Taken Time   /66 07/24/23 1341   Temp 36.7 °C (98.1 °F) 07/24/23 1331   Pulse 76 07/24/23 1341   Resp 17 07/24/23 1341   SpO2 99 % 07/24/23 1341         No case tracking events are documented in the log.      Pain/Beth Score: Beth Score: 9 (7/24/2023  1:41 PM)

## 2023-07-24 NOTE — PLAN OF CARE
Problem: Adult Inpatient Plan of Care  Goal: Plan of Care Review  Outcome: Ongoing, Progressing  Flowsheets (Taken 7/24/2023 0033)  Plan of Care Reviewed With:   patient   spouse  Goal: Patient-Specific Goal (Individualized)  Outcome: Ongoing, Progressing  Flowsheets (Taken 7/24/2023 0033)  Anxieties, Fears or Concerns: none  Individualized Care Needs: monitor labs  Goal: Absence of Hospital-Acquired Illness or Injury  Outcome: Ongoing, Progressing  Intervention: Identify and Manage Fall Risk  Flowsheets (Taken 7/24/2023 0033)  Safety Promotion/Fall Prevention:   assistive device/personal item within reach   nonskid shoes/socks when out of bed  Intervention: Prevent Skin Injury  Flowsheets (Taken 7/24/2023 0033)  Body Position: position changed independently  Skin Protection: adhesive use limited  Intervention: Prevent and Manage VTE (Venous Thromboembolism) Risk  Flowsheets (Taken 7/24/2023 0033)  Activity Management: Rolling - L1  VTE Prevention/Management: ambulation promoted  Range of Motion: active ROM (range of motion) encouraged  Goal: Optimal Comfort and Wellbeing  Outcome: Ongoing, Progressing  Intervention: Monitor Pain and Promote Comfort  Flowsheets (Taken 7/24/2023 0033)  Pain Management Interventions:   care clustered   quiet environment facilitated  Intervention: Provide Person-Centered Care  Flowsheets (Taken 7/24/2023 0033)  Trust Relationship/Rapport:   care explained   choices provided   emotional support provided   empathic listening provided   thoughts/feelings acknowledged   reassurance provided   questions encouraged   questions answered  Goal: Readiness for Transition of Care  Outcome: Ongoing, Progressing     Problem: Fall Injury Risk  Goal: Absence of Fall and Fall-Related Injury  Outcome: Ongoing, Progressing  Intervention: Identify and Manage Contributors  Flowsheets (Taken 7/24/2023 0033)  Self-Care Promotion: BADL personal objects within reach  Medication Review/Management:  medications reviewed

## 2023-07-25 NOTE — PT/OT/SLP EVAL
"Speech Language Pathology Department  Clinical Swallow Evaluation    Patient Name:  Ezequiel Merchant   MRN:  [id number]    Recommendations:     General recommendations:  Modified Barium Swallow Study  Diet recommendations:  NPO, Liquid Diet Level: NPO   Medications:  NPO  Precautions: Standard, aspiration    History:     Ezequiel Merchant is a/n 82 y.o. male admitted for increased trouble swallowing.    Past Medical History:   Diagnosis Date    CAD in native artery     Dementia     DJD (degenerative joint disease)     GERD (gastroesophageal reflux disease)     Hallux valgus (acquired), unspecified foot     Hammer toe, acquired     Hx of cancer of lung     Hypercholesteremia     Hypertension     Lung cancer     Onychomycosis of toenail     PAD (peripheral artery disease)     PVD (peripheral vascular disease)     Tinea pedis     Tobacco user     Unspecified dementia without behavioral disturbance      Past Surgical History:   Procedure Laterality Date    BACK SURGERY      CORONARY STENT PLACEMENT      DEBRIDEMENT OF FOOT Left     EGD, WITH BALLOON DILATION OF LESS THAN 30 MILLIMETERS  7/24/2023    Procedure: EGD, WITH BALLOON DILATION OF LESS THAN 30 MILLIMETERS;  Surgeon: Branden Ruffin MD;  Location: [facility] ENDOSCOPY;  Service: Gastroenterology;;  esophogeal dilation 6.5mm only    EGD, WITH CLOSED BIOPSY  7/24/2023    Procedure: EGD, WITH CLOSED BIOPSY;  Surgeon: Branden Ruffin MD;  Location: [facility] ENDOSCOPY;  Service: Gastroenterology;;    ESOPHAGOGASTRODUODENOSCOPY N/A 08/02/2022    Procedure: EGD w/ dil;  Surgeon: Pollo Hairston MD;  Location: [facility] ENDOSCOPY;  Service: Gastroenterology;  Laterality: N/A;    gastrointestinal biopsy      TOE AMPUTATION Left        Current Method of Nutrition: NPO    Patient complaint: "I'm thirsty"    Pt known to this clinician from recent MBS completion on outpatient basis on 7/7/23.  Diet recommendations were soft and bite sized solids with " mildly thick liquids (SILENT aspiration of thin liquids visualized).    Pt reportedly unable to tolerate liquids at home therefore admitted to ER.  GI attempted PEG placement yesterday however was unsuccessful due to esophageal stenosis.  Discussed POC with GI PA prior to completing bedside swallow evaluation.    Imaging   Results for orders placed during the hospital encounter of 07/21/23    X-Ray Chest 1 View    Narrative  EXAMINATION:  XR CHEST 1 VIEW    CLINICAL HISTORY:  Food in respiratory tract, part unspecified causing other injury, initial encounter    TECHNIQUE:  Single view of the chest    COMPARISON:  02/24/2023    FINDINGS:  No focal opacification, pleural effusion, or pneumothorax.    The cardiomediastinal silhouette is within normal limits.    No acute osseous abnormality.    Impression  No acute cardiopulmonary process.      Electronically signed by: Rakesh Mena  Date:    07/21/2023  Time:    14:56    Results for orders placed in visit on 05/29/19    CT Chest With Contrast    Narrative  CLINICAL:  Malignant neoplasm of the upper lobe, left bronchus.    TECHNIQUE:  Multidetector axial images were performed from thoracic  inlet to below hemidiaphragms following intravenous contrast  administration.  Sagittal and coronal reformations performed.    Automated exposure control was utilized to minimize radiation dose.    Comparison made to CT chest February 19, 2019 and PET scan April 20, 2017, 2018    FINDINGS:  PET scan acquired on April 27, 2018 was remarkable for left  upper lung lobe hypermetabolic mass which was a biopsy-proven squamous  cell carcinoma. This regressed in size on the subsequent CT chest  exams. On  the current exam in the same vicinity of previous mass,  there are coarse linear airspace densities which maintain similar  appearance and distribution. There is no development of new discrete  pulmonary nodule or mass lesion. Bilateral lungs dependent  hypoventilatory changes and lungs  are also unremarkable for  centrilobular emphysema. There is no acute airspace consolidation or  cavitary process. Pleural and the pericardial spaces are without fluid  accumulation.    Stable size of right thyroid gland hypodense nodule. No enlarging  thoracic lymphadenopathy. There is mild ectasia of ascending aorta  with maximum diameter of 3.7 cm. Heart size is upper limits of normal.    Small size hiatal hernia. No acute findings of the visualized upper  abdomen viscera. Degenerative changes of the spine. No acute or  aggressive skeletal abnormality.    IMPRESSION:    1.  Left upper lung lobe coarse linear densities remain stable and  have the appearance of pulmonary scarring.  2.  No new findings.      Electronically Signed By: Uri Lugo MD  Date/Time Signed: 05/29/2019 11:40    Results for orders placed during the hospital encounter of 10/30/22    MRI BRAIN WITHOUT CONTRAST    Narrative  EXAMINATION:  MRI BRAIN WITHOUT CONTRAST    CLINICAL HISTORY:  Stroke, follow up;    TECHNIQUE:  Multiplanar, multisequence MR images of the brain were obtained without the administration of intravenous contrast.    COMPARISON:  CT head dated 10/30/2022    FINDINGS:  There is no restricted diffusion, hemorrhage or edema. There are scattered small areas of encephalomalacia in the right frontal lobe, bilateral basal ganglia, thalami and bilateral cerebellar hemispheres, with moderate patchy T2/FLAIR hyperintensities in the subcortical and periventricular white matter.    There is no mass effect or midline shift.  The basal cisterns are patent. There is parenchymal volume loss with ex vacuo dilatation of the right frontal horn. There is no abnormal extra-axial fluid collection.  The major intracranial flow voids are patent.  The paranasal sinuses and mastoid air cells are clear.    Impression  1. No acute intracranial abnormality.  2. Moderate chronic microvascular ischemic changes.      Electronically signed by: Martina  "Kuldip  Date:    10/31/2022  Time:    12:42    Subjective     Patient awake and alert.  Oriented to self and "my throat" for reason for admit.    Patient goals: to eat/drink     Spiritual/Cultural/Jain Beliefs/Practices that affect care: no  Pain/Comfort: Pain Rating 1: 0/10    Respiratory status: room air  Restraints/positioning devices: none    Objective:     ORAL MUSCULATURE  Dentition: edentulous  Secretion Management: adequate  Mucosal Quality: good  Buccal Strength & Mobility: WFL  Mandibular Strength & Mobility: WFL  Oral Labial Strength & Mobility: WFL  Lingual Strength & Mobility: WFL  Vocal Quality: hoarse  Volitional Cough: Nonproductive    Consistency Fed By Oral Symptoms Pharyngeal Symptoms   Mildly thick liquid by spoon SLP Bolus holding None   Mildly thick liquid by cup Self Bolus holding Multiple swallows   Puree Self Bolus holding None     Assessment:     Pt with recent known hx of SILENT aspiration.  SLP rec: NPO, will proceed with MBS to assess current swallow function.    Goals:     Multidisciplinary Problems       SLP Goals       Not on file                  Patient Education:     Patient provided with verbal education regarding POC.  Understanding was verbalized, however additional teaching warranted.    Plan:     SLP Follow-Up:      Patient to be seen:      Plan of Care expires:     Plan of Care reviewed with:  patient      Time Tracking:     SLP Treatment Date:   07/25/23  Speech Start Time:  0845  Speech Stop Time:  0900     Speech Total Time (min):  15 min    Billable minutes:  Swallow and Oral Function Evaluation, 15 minutes     07/25/2023            "

## 2023-07-25 NOTE — PROGRESS NOTES
Inpatient Nutrition Assessment    Admit Date: 7/21/2023   Total duration of encounter: 4 days     Nutrition Recommendation/Prescription     Advance diet as medically feasible per SLP recommendations  PPN recommendations if needed.  Recommendations: Clinimix 4.25/5 @ 75 mL/hr  Kcal: 612 kcal/day (~38% est kcal needs)  AA: 77 g/day (~118% est protein needs)  Dextrose: 90 g/day (GIR: 1.14 mg/kg/min)  Fluid: 1800 mL/day (~110% est fluid needs)  Monitor electrolytes and replete as needed  Monitor PO intake, weight, and labs    Communication of Recommendations: reviewed with nurse    Nutrition Assessment     Malnutrition Assessment/Nutrition-Focused Physical Exam    Malnutrition Context: other (see comments) (Unable to complete NFPE at this time.)  Malnutrition Level: other (see comments) (Unable to complete NFPE at this time.)     Weight Loss (Malnutrition): 20% in 1 year                                                  A minimum of two characteristics is recommended for diagnosis of either severe or non-severe malnutrition.    Chart Review    Reason Seen: continuous nutrition monitoring Aspiration of food, new PPN    Malnutrition Screening Tool Results   Have you recently lost weight without trying?: Yes: Unsure how much  Have you been eating poorly because of a decreased appetite?: Yes   MST Score: 3     Diagnosis:  Esophageal cancer with obstructing mass   Severe protein calorie malnutrition  Oropharyngeal dysphagia   AMNA  IVVD  Anemia of malignancy  History of:  Lung cancer, prostate cancer, hypertension, seizure disorder, dementia, coronary artery disease, chronic systolic heart failure     Relevant Medical History:    CAD in native artery      Dementia      DJD (degenerative joint disease)      GERD (gastroesophageal reflux disease)      Hallux valgus (acquired), unspecified foot      Hammer toe, acquired      Hx of cancer of lung      Hypercholesteremia      Hypertension      Lung cancer      Onychomycosis of  "toenail      PAD (peripheral artery disease)      PVD (peripheral vascular disease)      Tinea pedis      Tobacco user      Unspecified dementia without behavioral disturbance        Nutrition-Related Medications: Famotidine, Keppra, Clinimix, Elecrolyte-A infusion @ 50 mL/hr  Calorie Containing IV Medications: Clinimix    Nutrition-Related Labs:  2023: WBC 3.94, H/H 12.3/39.1, Ca 8.4, Gluc 96    Diet/PN Order: Amino acid 4.25% - dextrose 5% (CLINIMIX-E) solution (1L provides 42.5 gm AA, 50 gm CHO (170 kcal/L dextrose), Na 35, K 30, Mg 5, Ca 4.5, Acetate 70, Cl 39, Phos 15)  Diet NPO  Oral Supplement Order: none  Tube Feeding Order: none  Appetite/Oral Intake: NPO/NPO  Factors Affecting Nutritional Intake: difficulty/impaired swallowing and NPO  Food/Restoration/Cultural Preferences: unable to obtain  Food Allergies: unable to obtain       Wound(s):   none noted    Comments    2023: Pt gone for MBS at time of visit, spoke with nurse. Pt has Clinimix 4.25/5 @ 75 mL/hr ordered. Provided PPN recommendations if needed. Possible Hospice per MD note, PEG placement not probable per EMR. Per EMR, pt weighed 67.6 kg on 2022 (19% wt loss in 11 months, significant). Last BM documented as 2023. Will monitor.    Anthropometrics    Height: 5' 5" (165.1 cm)    Last Weight: 54.4 kg (120 lb) (23) Weight Method: Bed Scale     BMI Classification: underweight (BMI less than 22 if >65 years of age)        Ideal Body Weight (IBW), Male: 136 lb                       Usual Body Weight (UBW), k.6 kg  % Usual Body Weight: 80.69     Usual Weight Provided By: EMR weight history    Wt Readings from Last 5 Encounters:   23 54.4 kg (120 lb)   23 57.2 kg (126 lb)   23 59 kg (130 lb)   10/31/22 58.1 kg (128 lb)   22 67.6 kg (149 lb)     Weight Change(s) Since Admission:  Admit Weight: 54.4 kg (120 lb) (23 1113)  2023: 54.4 kg    Estimated Needs    Weight Used For Calorie " Calculations: 54.4 kg (119 lb 14.9 oz)  Energy Calorie Requirements (kcal): 5845-7321 (3-35 kcal/kg)  Energy Need Method: Kcal/kg  Weight Used For Protein Calculations: 54.4 kg (119 lb 14.9 oz)  Protein Requirements: 65-82 (1.2-1.5 g/kg)  Fluid Requirements (mL): 1632 (1 mL/kcal)  Temp (24hrs), Av.1 °F (36.7 °C), Min:97.7 °F (36.5 °C), Max:99 °F (37.2 °C)       Enteral Nutrition    Patient not receiving enteral nutrition at this time.    Parenteral Nutrition    Patient not receiving parenteral nutrition support at this time.    Evaluation of Received Nutrient Intake    Calories: not meeting estimated needs  Protein: exceeding estimated needs    Patient Education    Not applicable.    Nutrition Diagnosis     PES: Inadequate oral intake related to swallowing difficulty as evidenced by NPO and aspiration of food diagnosis. (new)    Interventions/Goals     Intervention(s): general/healthful diet, modified composition of parenteral nutrition, and modified rate of parenteral nutrition  Goal: Meet greater than 75% of nutritional needs by follow-up. (new)    Monitoring & Evaluation     Dietitian will monitor food and beverage intake, parenteral nutrition intake, weight, electrolyte/renal panel, glucose/endocrine profile, and gastrointestinal profile.  Nutrition Risk/Follow-Up: high (follow-up in 1-4 days)   Please consult if re-assessment needed sooner.

## 2023-07-25 NOTE — PLAN OF CARE
Problem: Adult Inpatient Plan of Care  Goal: Plan of Care Review  Outcome: Ongoing, Progressing  Flowsheets (Taken 7/25/2023 0339)  Plan of Care Reviewed With: patient  Goal: Patient-Specific Goal (Individualized)  Outcome: Ongoing, Progressing  Flowsheets (Taken 7/25/2023 0339)  Anxieties, Fears or Concerns: none  Individualized Care Needs: monitor labs  Goal: Absence of Hospital-Acquired Illness or Injury  Outcome: Ongoing, Progressing  Intervention: Identify and Manage Fall Risk  Flowsheets (Taken 7/25/2023 0339)  Safety Promotion/Fall Prevention:   assistive device/personal item within reach   nonskid shoes/socks when out of bed  Intervention: Prevent Skin Injury  Flowsheets (Taken 7/25/2023 0339)  Body Position: position changed independently  Skin Protection: adhesive use limited  Intervention: Prevent and Manage VTE (Venous Thromboembolism) Risk  Flowsheets (Taken 7/25/2023 0339)  Activity Management:   Rolling - L1   Ambulated -L4   Sitting at edge of bed - L2  VTE Prevention/Management: ambulation promoted  Range of Motion: active ROM (range of motion) encouraged  Intervention: Prevent Infection  Flowsheets (Taken 7/25/2023 0339)  Infection Prevention: hand hygiene promoted  Goal: Optimal Comfort and Wellbeing  Outcome: Ongoing, Progressing  Intervention: Monitor Pain and Promote Comfort  Flowsheets (Taken 7/25/2023 0339)  Pain Management Interventions: care clustered  Intervention: Provide Person-Centered Care  Flowsheets (Taken 7/25/2023 0339)  Trust Relationship/Rapport:   care explained   choices provided   emotional support provided   empathic listening provided   thoughts/feelings acknowledged   reassurance provided   questions encouraged   questions answered  Goal: Readiness for Transition of Care  Outcome: Ongoing, Progressing     Problem: Fall Injury Risk  Goal: Absence of Fall and Fall-Related Injury  Outcome: Ongoing, Progressing  Intervention: Identify and Manage Contributors  Flowsheets  (Taken 7/25/2023 3624)  Self-Care Promotion: BADL personal objects within reach  Medication Review/Management: medications reviewed

## 2023-07-25 NOTE — PLAN OF CARE
Problem: Adult Inpatient Plan of Care  Goal: Plan of Care Review  Outcome: Ongoing, Progressing  Goal: Patient-Specific Goal (Individualized)  Outcome: Ongoing, Progressing  Goal: Absence of Hospital-Acquired Illness or Injury  Outcome: Ongoing, Progressing  Goal: Optimal Comfort and Wellbeing  Outcome: Ongoing, Progressing  Goal: Readiness for Transition of Care  Outcome: Ongoing, Progressing     Problem: Fall Injury Risk  Goal: Absence of Fall and Fall-Related Injury  Outcome: Ongoing, Progressing  Intervention: Identify and Manage Contributors  Flowsheets (Taken 7/25/2023 0851)  Medication Review/Management: medications reviewed  Intervention: Promote Injury-Free Environment  Flowsheets (Taken 7/25/2023 0851)  Safety Promotion/Fall Prevention:   assistive device/personal item within reach   nonskid shoes/socks when out of bed

## 2023-07-25 NOTE — CONSULTS
Inpatient consult to Palliative Care  Consult performed by: Ochoa Christiansen MD  Consult ordered by: Eugene Leo MD    Patient Name: Ezequiel Merchant   MRN: [id number]   Admission Date: 7/21/2023   Hospital Length of Stay: 3   Attending Provider: Eugene Leo MD   Consulting Provider: Ochoa Christiansen M.D.  Reason for Consult: Goals of Care  Primary Care Physician: Jamar Lenz DO     Principal Problem: <principal problem not specified>     Patient information was obtained from patient, spouse/SO, relative(s), ER records, and primary team.      Final diagnoses:  [T17.928A] Aspiration of food     Assessment/Plan:     I reviewed the patient and family's understanding of the seriousness of the illness and its expected prognosis. We discussed the patient's goals of care and treatment preferences. We discussed the difference between palliative and curative medicine. I explained the differences between home health, palliative and hospice care. I clarified current code status. I identified the surrogate decision maker or health care POA. I explained the difference between a living will (advanced directive) and LaPost. We discussed the patient's chosen code status as listed above and the contents of the LaPOST. I answered all questions and we formulated a plan including recommendations for symptom management and how to best achieve goals of care.     I reviewed the patient's current clinical status with the nurse. I reviewed clinical documentation, labs and imaging.     Symptom management review: Patient denies pain or other symptoms.    Advance Care Planning     Date: 07/25/2023    [facility]  I engaged the patient and family in a voluntary conversation about advance care planning and we specifically addressed what the goals of care would be moving forward, in light of the patient's change in clinical status, specifically progressive oropharyngeal dysphagia with underlying history of esophageal cancer and/or  lung cancer with mets to cervical lymph nodes. IN any case, the patient has had radiation to this are and according to family was doing better, but now can't swallow. GI dilated but was unable to place desired PEG yesterday due to narrowing.  I noted to family that I can not tell what his prognosis may be.     I reviewed code status with patient and family. I reviewed the high risk of death and of complications that may negatively effect his quality of life In the event of survival. Although his family seems to be against full code, the patient responded that he would be in favor of full code status. His family wishes to go with his choice. I am not sure that he understood my review of risks, but family has decided. Pt will remain a full code status.    Discussing prognosis. If he has metastatic lung cancer, he is not currently receiving any chemo and his prognosis is likely <6 months. He may be able to survive longer with surgical PEG but without, he is at high risk of aspiration pneumonia and minimal po intake for which I estimate his prognosis is likely <1 month. I also reviewed his past medical history for which his family states that he has heart disease and dementia but is ambulatory. Patient and family will elect surgical PEG. I notified Dr. Leo who will place a surgical consult..      We explored the patient's values and preferences for future care.  The patient and family endorses that what is most important right now is to focus on avoiding the hospital, symptom/pain control, and curative/life-prolongation (regardless of treatment burdens) We have encouraged home hospice care as the patient would prefer to remain in the home and comfortable. He may also benefit from the services it would provide. His SO is open to this after PEG placement.     Accordingly, we have decided that the best plan to meet the patient's goals includes continuing with treatment and enrolling in hospice care. I did explain the  role for hospice care at this stage of the patient's illness, including its ability to help the patient live with the best quality of life possible.  We will be making a hospice referral.        Disposition: Home with hospice care after surgical PEG    History of Present Illness:     83 y/o M h/o lung cancer, esophageal cancer s/p recent radiation therapy. He has a history of esophageal dilation. He is admitted with severe oropharyngeal dysphagia. We were consulted to review goals of care with patient and family.      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 07/14/2021    Witnessed seizure-like activity 11/01/2022    Esophageal foreign body, initial encounter 03/20/2023     Resolved Ambulatory Problems     Diagnosis Date Noted    Syncope 10/31/2022     Past Medical History:   Diagnosis Date    CAD in native artery     Dementia     DJD (degenerative joint disease)     GERD (gastroesophageal reflux disease)     Hallux valgus (acquired), unspecified foot     Hammer toe, acquired     Hx of cancer of lung     Hypercholesteremia     Hypertension     Lung cancer     Onychomycosis of toenail     PAD (peripheral artery disease)     PVD (peripheral vascular disease)     Tinea pedis     Tobacco user     Unspecified dementia without behavioral disturbance         Past Surgical History:   Procedure Laterality Date    BACK SURGERY      CORONARY STENT PLACEMENT      DEBRIDEMENT OF FOOT Left     EGD, WITH BALLOON DILATION OF LESS THAN 30 MILLIMETERS  7/24/2023    Procedure: EGD, WITH BALLOON DILATION OF LESS THAN 30 MILLIMETERS;  Surgeon: Branden Ruffin MD;  Location: [facility] ENDOSCOPY;  Service: Gastroenterology;;  esophogeal dilation 6.5mm only    EGD, WITH CLOSED BIOPSY  7/24/2023    Procedure: EGD, WITH CLOSED BIOPSY;  Surgeon: Branden Ruffin MD;  Location: [facility] ENDOSCOPY;  Service: Gastroenterology;;    ESOPHAGOGASTRODUODENOSCOPY N/A  "08/02/2022    Procedure: EGD w/ dil;  Surgeon: Pollo Hairston MD;  Location: [facility] ENDOSCOPY;  Service: Gastroenterology;  Laterality: N/A;    gastrointestinal biopsy      TOE AMPUTATION Left         Review of patient's allergies indicates:  No Known Allergies       Current Facility-Administered Medications:     albuterol inhaler 2 puff, 2 puff, Inhalation, Q6H PRN, Eugene Leo MD    Amino acid 4.25% - dextrose 5% (CLINIMIX-E) solution (1L provides 42.5 gm AA, 50 gm CHO (170 kcal/L dextrose), Na 35, K 30, Mg 5, Ca 4.5, Acetate 70, Cl 39, Phos 15), , Intravenous, Continuous, Eugene Leo MD, Last Rate: 75 mL/hr at 07/25/23 1028, New Bag at 07/25/23 1028    electrolyte-A infusion, , Intravenous, Continuous, Miller Jaeger Jr., MD    famotidine (PF) injection 20 mg, 20 mg, Intravenous, Daily, Eugene Leo MD, 20 mg at 07/25/23 0847    hydrALAZINE injection 10 mg, 10 mg, Intravenous, Q4H PRN, Eugene Leo MD    labetaloL injection 10 mg, 10 mg, Intravenous, Q4H PRN, Eugene Leo MD    levETIRAcetam (Keppra) 500 mg in dextrose 5 % in water (D5W) 5 % 100 mL IVPB (MB+), 500 mg, Intravenous, Q12H, Eugene Leo MD, Stopped at 07/25/23 0916    melatonin tablet 6 mg, 6 mg, Oral, Nightly PRN, Eugene Leo MD    sodium chloride 0.9% flush 10 mL, 10 mL, Intravenous, PRN, Eugene Leo MD     albuterol, hydrALAZINE, labetalol, melatonin, sodium chloride 0.9%     Family History   Problem Relation Age of Onset    Stroke Mother     Diabetes Father         Review of Systems   Constitutional:  Positive for activity change and appetite change.   HENT:  Positive for trouble swallowing.    Respiratory:  Negative for shortness of breath.    Gastrointestinal:  Negative for abdominal pain, constipation and nausea.   All other systems reviewed and are negative.         Objective:   BP (!) 143/82   Pulse 70   Temp 98.2 °F (36.8 °C) (Oral)   Resp 16   Ht 5' 5" (1.651 m)   Wt 54.4 kg (120 lb)   SpO2 98%   BMI 19.97 kg/m²  "     Physical Exam  Vitals reviewed.   Constitutional:       General: He is not in acute distress.     Appearance: He is not ill-appearing or toxic-appearing.   HENT:      Head: Normocephalic.      Right Ear: External ear normal.      Left Ear: External ear normal.      Nose: Nose normal.      Mouth/Throat:      Pharynx: Oropharynx is clear.   Eyes:      Extraocular Movements: Extraocular movements intact.      Conjunctiva/sclera: Conjunctivae normal.      Pupils: Pupils are equal, round, and reactive to light.   Cardiovascular:      Rate and Rhythm: Normal rate and regular rhythm.      Pulses: Normal pulses.      Heart sounds: Normal heart sounds.   Pulmonary:      Effort: Pulmonary effort is normal.      Breath sounds: Normal breath sounds.   Abdominal:      General: Abdomen is flat. Bowel sounds are normal. There is no distension.      Palpations: Abdomen is soft.      Tenderness: There is no abdominal tenderness.   Musculoskeletal:      Right lower leg: No edema.      Left lower leg: No edema.   Skin:     General: Skin is warm.   Neurological:      Mental Status: He is alert and oriented to person, place, and time.   Psychiatric:         Mood and Affect: Mood normal.         Behavior: Behavior normal.         Thought Content: Thought content normal.         Judgment: Judgment normal.          Review of Symptoms  Review of Symptoms      Symptom Assessment (ESAS 0-10 Scale)  Pain:  0  Dyspnea:  0  Anxiety:  0  Nausea:  0  Depression:  0  Anorexia:  0  Fatigue:  0  Insomnia:  0  Restlessness:  0  Agitation:  0     CAM / Delirium:  Negative  Constipation:  Negative  Diarrhea:  Negative    Constipation:  No constipation    Bowel Management Plan (BMP):  Yes      Pain Assessment:  OME in 24 hours:  0  Location(s):      Modified Lyric Scale:  0    Performance Status:  40    Living Arrangements:  Lives with spouse    Psychosocial/Cultural:   See Palliative Psychosocial Note: No  Pt lives with SO for many years. She is not  a legal spouse. He has 1 son who is not able to be contacted. SO states that she has POA and will bring this in tomorrow.  **Primary  to Follow**  Palliative Care  Consult: No    Spiritual:  F - Khadra and Belief:  Sabianist  A - Address in Care:  Will consult  at family's request     Time-Based Charting:  Yes    Total Time Spent: 0 minutes    Advance Care Planning   Advance Directives:   Living Will: No    LaPOST: No    Do Not Resuscitate Status: No    Medical Power of : No    Agent's Name:  KEON Jung   Agent's Contact Number:  [phone]    Decision Making:  Patient answered questions and Family answered questions          Caregiver burden formerly assessed: Yes        > 50% of 78 min of encounter was spent in chart review, face to face discussion of goals of care, symptom assessment, coordination of care and emotional support.     Ochoa Christiansen M.D.  Palliative Medicine  [facility]

## 2023-07-25 NOTE — PROGRESS NOTES
[facility]  Hospital Medicine Progress Note        Chief Complaint: Inpatient Follow-up     HPI:   82-year-old male with past medical history of lung cancer, esophageal cancer, and remote prostate cancer the.  Unclear if these are metastasis were  diagnoses with.  He has not followed by Oncology as an outpatient.  Never received chemotherapy.  He did complete 30 fractions of radiation therapy is still followed by the radiation oncologist here at [facility].  He presents to the ER on 07/21 with worsening swallowing.  He is now choking on all liquids and medications.  The is a family friend bedside he states that they have seen GI in the past and has had to have an esophageal dilation.  She thinks it was about a year ago.  On review of the chart it looks like he was in August of 2022.  She states that they recently saw their primary care physician who was recommending PEG tube due to he did some better nutrition.  He appears cachectic but friend states that he still pretty active and is able to visit with family.  Denies any current pain.  On conversations of his goals of care they both state that they have never considered hospice.  He did have palliative care visit with them about a year ago but decided that he was not ready for it.  They are more open to it at this point.  They both report that he would like the PEG tube to try to improve his quality of life.  We discussed code status.  They have not made any considerations in this area.  Will remain full code for now but will continues conversations through the weekend.  Patient was taken to GI lab on 07/24 for PEG. They were able to do some esophageal dilations but no PEG was placed.  Returned to the floor GI recommending full liquid diet.  On the floor he continues to choke on oral.     Interval Hx:  Status post dilation.  He choked on shallow yesterday evening.  Will try him with some thin liquids to see this can  get past the obstruction but if not will need to remain NPO.  I did speak with hospice agency yesterday he will come to visit with the family today.  Unfortunately he is not able to get a PEG tube.  Will need to pursue pleasure feeds and hospice care.  He is a full code at this time.     Objective/physical exam:  General: In no acute distress, afebrile  Chest: Clear to auscultation bilaterally  Heart: RRR, +S1, S2, no appreciable murmur  Abdomen: Soft, nontender, BS +  MSK: Warm, no lower extremity edema, no clubbing or cyanosis  Neurologic: Alert and oriented x4, Cranial nerve II-XII intact, Strength 5/5 in all 4 extremities    VITAL SIGNS: 24 HRS MIN & MAX LAST   Temp  Min: 97.3 °F (36.3 °C)  Max: 98.2 °F (36.8 °C) 98.2 °F (36.8 °C)   BP  Min: 97/57  Max: 144/77 125/79   Pulse  Min: 70  Max: 80  78   Resp  Min: 14  Max: 19 18   SpO2  Min: 93 %  Max: 100 % 97 %       Recent Labs   Lab 07/21/23  1125 07/22/23  0406 07/24/23  0322   WBC 4.76 4.64 3.94*   RBC 5.46 4.98 5.28   HGB 12.8* 11.6* 12.3*   HCT 40.7* 37.3* 39.1*   MCV 74.5* 74.9* 74.1*   MCH 23.4* 23.3* 23.3*   MCHC 31.4* 31.1* 31.5*   RDW 14.6 14.4 14.1    197 241   MPV 10.4 10.2 10.5*       Recent Labs   Lab 07/21/23  1125 07/22/23  0406 07/24/23  0322    139 137   K 4.2 3.7 4.2   CO2 27 25 23   BUN 22.6 20.9 20.8   CREATININE 1.38* 0.91 1.00   CALCIUM 9.3 8.6* 8.4*   ALBUMIN 3.2*  --   --    ALKPHOS 72  --   --    ALT 9  --   --    AST 16  --   --    BILITOT 0.6  --   --           Microbiology Results (last 7 days)       ** No results found for the last 168 hours. **             See below for Radiology    Scheduled Med:   famotidine (PF)  20 mg Intravenous Daily    levETIRAcetam (Keppra) IV (PEDS and ADULTS)  500 mg Intravenous Q12H        Continuous Infusions:   Amino acid 4.25% - dextrose 5% (CLINIMIX-E) solution (1L provides 42.5 gm AA, 50 gm CHO (170 kcal/L dextrose), Na 35, K 30, Mg 5, Ca 4.5, Acetate 70, Cl 39, Phos 15) 75 mL/hr at  07/24/23 1558    electrolyte-A          PRN Meds:  albuterol, hydrALAZINE, labetalol, melatonin, sodium chloride 0.9%       Assessment/Plan:   Esophageal cancer with obstructing mass   Severe protein calorie malnutrition  Oropharyngeal dysphagia   AMNA  IVVD  Anemia of malignancy  History of:  Lung cancer, prostate cancer, hypertension, seizure disorder, dementia, coronary artery disease, chronic systolic heart failure      Will get a repeat speech therapy evaluation today.  GI recommending full liquids but he continues to choke last night.  NPO for now.    He remains on Clinimix for now.    Hospice agency to come speak with the patient and family members today.  They are very resistant to the word of hospice but they seem to want all of these services.  If we can not clear him for an oral diet would recommend pleasure feeds and DNR status.  He is currently full code.  GI recommending repeat scope in 3-4 weeks possible    Update:  I had another extended conversation with Ms. Morin, the patient's girlfriend about goals of care and the results of the EGD.  PEG was not able to be placed and if they wish to pursue this it would hav eto be done under anesthesia with surgery.  His esophagus was dilated but still having choking episodes and remains NPO.  Speech was consulted and recommending NPO with possible MBS.    I spoke with Ms. Morin about the poor long term prognosis regarding his malignancy and now esophageal stricture.  He has completed palliative XRT and is not a candidate for CTX.  She had previously voiced understanding and was willing to speak to a hospice agency about possible services upon discharge. She has not committed to the idea of hospice but was open to an informational visit.  This was set up for this morning but unfortunately when hospice representative arrived, they were dismissed as she told the CM she was not ready for hospice.  I called her again and reiterated this is simply an informative  meeting and no decisions are needed at this point in time.  She agrees and would like to speak further now.  Of note Ms. Morin is a long term girlfriend of the patient but has no legal say over his decision. Do to the complicated nature of these discussions I will ask palliative care to spend some time with both the patient and Ms. Morin. If they still wish to proceed with PEG placement, we will need to consult general surgery.    Advanced Directives:  I spent 30 mins of face to face discussion regarding advanced directives and end of life planning which included the patient and patients family, who concluded, that they would like to remain FULL CODE. The patient understands the seriousness of his/her condition and would like to make his/her end of life decision known.      All diagnosis and differential diagnosis have been reviewed; assessment and plan has been documented; I have personally reviewed the labs and test results that are presently available; I have reviewed the patients medication list; I have reviewed the consulting providers response and recommendations. I have reviewed or attempted to review medical records based upon their availability    All of the patient's questions have been  addressed and answered. Patient's is agreeable to the above stated plan. I will continue to monitor closely and make adjustments to medical management as needed.  _____________________________________________________________________      Radiology:  X-Ray Chest 1 View  Narrative: EXAMINATION:  XR CHEST 1 VIEW    CLINICAL HISTORY:  Food in respiratory tract, part unspecified causing other injury, initial encounter    TECHNIQUE:  Single view of the chest    COMPARISON:  02/24/2023    FINDINGS:  No focal opacification, pleural effusion, or pneumothorax.    The cardiomediastinal silhouette is within normal limits.    No acute osseous abnormality.  Impression: No acute cardiopulmonary process.    Electronically signed  by: Rakesh Mena  Date:    07/21/2023  Time:    14:56      Eugene Leo MD   07/25/2023

## 2023-07-25 NOTE — PROCEDURES
Speech Language Pathology Department  Modified Barium Swallow (MBS) Study    Patient Name:  Ezequiel Merchant   MRN:  [id number]    Recommendations:     General recommendations:  dysphagia therapy  Repeat MBS study: 2-3 weeks or as needed   Diet recommendations:  NPO, Liquid Diet Level: NPO   Medications:  NPO  General precautions: Standard, aspiration    History/Reason for Referral:     Ezequiel Merchant is a/n 82 y.o. male admitted for increased trouble swallowing.  Past Medical History:   Diagnosis Date    CAD in native artery     Dementia     DJD (degenerative joint disease)     GERD (gastroesophageal reflux disease)     Hallux valgus (acquired), unspecified foot     Hammer toe, acquired     Hx of cancer of lung     Hypercholesteremia     Hypertension     Lung cancer     Onychomycosis of toenail     PAD (peripheral artery disease)     PVD (peripheral vascular disease)     Tinea pedis     Tobacco user     Unspecified dementia without behavioral disturbance      Past Surgical History:   Procedure Laterality Date    BACK SURGERY      CORONARY STENT PLACEMENT      DEBRIDEMENT OF FOOT Left     EGD, WITH BALLOON DILATION OF LESS THAN 30 MILLIMETERS  7/24/2023    Procedure: EGD, WITH BALLOON DILATION OF LESS THAN 30 MILLIMETERS;  Surgeon: Branden Ruffin MD;  Location: [facility] ENDOSCOPY;  Service: Gastroenterology;;  esophogeal dilation 6.5mm only    EGD, WITH CLOSED BIOPSY  7/24/2023    Procedure: EGD, WITH CLOSED BIOPSY;  Surgeon: Branden Ruffin MD;  Location: [facility] ENDOSCOPY;  Service: Gastroenterology;;    ESOPHAGOGASTRODUODENOSCOPY N/A 08/02/2022    Procedure: EGD w/ dil;  Surgeon: Pollo Hairston MD;  Location: [facility] ENDOSCOPY;  Service: Gastroenterology;  Laterality: N/A;    gastrointestinal biopsy      TOE AMPUTATION Left      A MBS Study was completed to assess the efficiency of his swallow function, rule out aspiration and make recommendations regarding safe dietary  consistencies, effective compensatory strategies, and safe eating environment.     Current Method of Nutrition: NPO    Patient complaint: to eat/drink    Imaging   Results for orders placed during the hospital encounter of 07/21/23    X-Ray Chest 1 View    Narrative  EXAMINATION:  XR CHEST 1 VIEW    CLINICAL HISTORY:  Food in respiratory tract, part unspecified causing other injury, initial encounter    TECHNIQUE:  Single view of the chest    COMPARISON:  02/24/2023    FINDINGS:  No focal opacification, pleural effusion, or pneumothorax.    The cardiomediastinal silhouette is within normal limits.    No acute osseous abnormality.    Impression  No acute cardiopulmonary process.      Electronically signed by: Rakesh Mena  Date:    07/21/2023  Time:    14:56    Results for orders placed in visit on 05/29/19    CT Chest With Contrast    Narrative  CLINICAL:  Malignant neoplasm of the upper lobe, left bronchus.    TECHNIQUE:  Multidetector axial images were performed from thoracic  inlet to below hemidiaphragms following intravenous contrast  administration.  Sagittal and coronal reformations performed.    Automated exposure control was utilized to minimize radiation dose.    Comparison made to CT chest February 19, 2019 and PET scan April 20, 2017, 2018    FINDINGS:  PET scan acquired on April 27, 2018 was remarkable for left  upper lung lobe hypermetabolic mass which was a biopsy-proven squamous  cell carcinoma. This regressed in size on the subsequent CT chest  exams. On  the current exam in the same vicinity of previous mass,  there are coarse linear airspace densities which maintain similar  appearance and distribution. There is no development of new discrete  pulmonary nodule or mass lesion. Bilateral lungs dependent  hypoventilatory changes and lungs are also unremarkable for  centrilobular emphysema. There is no acute airspace consolidation or  cavitary process. Pleural and the pericardial spaces are without  fluid  accumulation.    Stable size of right thyroid gland hypodense nodule. No enlarging  thoracic lymphadenopathy. There is mild ectasia of ascending aorta  with maximum diameter of 3.7 cm. Heart size is upper limits of normal.    Small size hiatal hernia. No acute findings of the visualized upper  abdomen viscera. Degenerative changes of the spine. No acute or  aggressive skeletal abnormality.    IMPRESSION:    1.  Left upper lung lobe coarse linear densities remain stable and  have the appearance of pulmonary scarring.  2.  No new findings.      Electronically Signed By: Uri Lugo MD  Date/Time Signed: 05/29/2019 11:40    Results for orders placed during the hospital encounter of 10/30/22    MRI BRAIN WITHOUT CONTRAST    Narrative  EXAMINATION:  MRI BRAIN WITHOUT CONTRAST    CLINICAL HISTORY:  Stroke, follow up;    TECHNIQUE:  Multiplanar, multisequence MR images of the brain were obtained without the administration of intravenous contrast.    COMPARISON:  CT head dated 10/30/2022    FINDINGS:  There is no restricted diffusion, hemorrhage or edema. There are scattered small areas of encephalomalacia in the right frontal lobe, bilateral basal ganglia, thalami and bilateral cerebellar hemispheres, with moderate patchy T2/FLAIR hyperintensities in the subcortical and periventricular white matter.    There is no mass effect or midline shift.  The basal cisterns are patent. There is parenchymal volume loss with ex vacuo dilatation of the right frontal horn. There is no abnormal extra-axial fluid collection.  The major intracranial flow voids are patent.  The paranasal sinuses and mastoid air cells are clear.    Impression  1. No acute intracranial abnormality.  2. Moderate chronic microvascular ischemic changes.      Electronically signed by: Martina Christiansen  Date:    10/31/2022  Time:    12:42    Subjective:     Patient awake and alert.    Spiritual/Cultural/Episcopal Beliefs/Practices that affect care:  no  Pain/Comfort: Pain Rating 1: 0/10    Respiratory Status: room air  Restraints/positioning devices: none    Fluoroscopic Results:     Oral Musculature  Dentition: edentulous  Secretion Management: adequate  Mucosal Quality: good  Buccal Strength & Mobility: WFL  Mandibular Strength & Mobility: WFL  Oral Labial Strength & Mobility: WFL  Lingual Strength & Mobility: WFL  Vocal Quality: hoarse  Volitional Cough: Nonproductive    Positioning: upright in bed  Visualization  Patient was seen in the lateral view    Oral Phase:   Prolonged/disorganized bolus formation  Disorganized lingual movement  Loss of bolus control with prespill to the pyriform sinuses    Pharyngeal Phase:   Swallow delay with spill to the pyriform sinuses  Reduced base of tongue retraction  Reduced epiglottic deflection  Reduced hyolaryngeal excursion  Poor airway protection  Laryngeal penetration with all consistencies given  Aspiration SILENT with mildly thick liquids  Consistency Laryngeal Penetration Aspiration Residue   Mildly thick liquid by spoon During the swallow  Did NOT clear None Mild  Base of tongue and Valleculae   Puree During the swallow  Did NOT clear None Mild  Base of tongue   Mildly thick liquid by cup During the swallow During the swallow  SILENT  Cued cough NOT productive Mild  Valleculae   Moderately thick liquid by cup During the swallow  Did NOT clear None Mild  Base of tongue and Valleculae       Cervical Esophageal Phase:   residual material visualized      Assessment:     Pt exhibited severe oropharyngeal dysphagia characterized by the findings noted above.  SILENT aspiration of mildly thick liquids did NOT clear from the laryngeal vestibule.  Laryngeal penetration of pureed solids and moderately thick liquids was visualized and did not clear.  Both swallow safety and efficiency are impaired.     Patient appears to be at high risk for aspiration related pneumonia when considering oral health status, overall  health/immune status, reduced laryngeal vestibule closure, and severity of dysphagia.  Prognosis for behavioral swallow rehabilitation is poor due to cognitive impairments .    Goals:     Multidisciplinary Problems       SLP Goals          Problem: SLP    Goal Priority Disciplines Outcome   SLP Goal     SLP Ongoing, Progressing   Description: LTG: Pt will tolerate least restrictive PO diet with no clinical signs/sx aspiration    STGs:  1. Pt will complete laryngeal strengthening exercises and roselia with minimal cues.  2.  Pt will tolerate thermal stimulation to the anterior faucial pillars with 100% effortful swallows and delay less than 2 seconds.                       Patient Education:     Patient provided with verbal education regarding results.  Understanding was verbalized, however additional teaching warranted.    Plan:     SLP Follow-Up:  Yes    Patient to be seen:  daily   Plan of Care expires:  08/01/23  Plan of Care reviewed with:  patient     Time Tracking:     SLP Treatment Date:   07/25/23  Speech Start Time:  1010  Speech Stop Time:  1030     Speech Total Time (min):  20 min    Billable minutes:   Motion Fluoroscopic Evaluation, Video Recording, 20 minutes     07/25/2023

## 2023-07-25 NOTE — PLAN OF CARE
Problem: SLP  Goal: SLP Goal  Description: LTG: Pt will tolerate least restrictive PO diet with no clinical signs/sx aspiration    STGs:  1. Pt will complete laryngeal strengthening exercises and roselia with minimal cues.  2.  Pt will tolerate thermal stimulation to the anterior faucial pillars with 100% effortful swallows and delay less than 2 seconds.    Outcome: Ongoing, Progressing       POC and goals initiated.  Karla

## 2023-07-25 NOTE — PROGRESS NOTES
"Gastroenterology Progress Note    Subjective:  NAEON. Patient without any acute GI complaints this morning.  Discussed findings on EGD yesterday and plans for repeat EGD in 3-4 weeks for repeat dilation- patient voiced understanding.    Objective:    ROS:    Review of Systems   Constitutional:  Positive for weight loss. Negative for chills and fever.   HENT:  Negative for sore throat.    Respiratory:  Negative for shortness of breath, wheezing and stridor.    Gastrointestinal:  Negative for abdominal pain, nausea and vomiting.   Musculoskeletal:  Negative for falls.   Skin:  Negative for itching and rash.   Neurological:  Negative for seizures and loss of consciousness.   Psychiatric/Behavioral:  The patient is not nervous/anxious.        Vital Signs:  BP 99/64   Pulse 75   Temp 99 °F (37.2 °C) (Oral)   Resp 16   Ht 5' 5" (1.651 m)   Wt 54.4 kg (120 lb)   SpO2 97%   BMI 19.97 kg/m²   Body mass index is 19.97 kg/m².    Physical Exam:    Physical Exam  Constitutional:       General: He is not in acute distress.  HENT:      Head: Normocephalic and atraumatic.      Nose: Nose normal.   Eyes:      Extraocular Movements: Extraocular movements intact.   Cardiovascular:      Pulses: Normal pulses.   Pulmonary:      Effort: No respiratory distress.      Breath sounds: No stridor.   Abdominal:      General: There is no distension.      Tenderness: There is no guarding.   Skin:     General: Skin is warm and dry.      Coloration: Skin is not jaundiced or pale.   Neurological:      General: No focal deficit present.      Mental Status: He is alert and oriented to person, place, and time. Mental status is at baseline.   Psychiatric:         Mood and Affect: Mood normal.         Behavior: Behavior normal.         Thought Content: Thought content normal.         Judgment: Judgment normal.       Labs:  No results found for this or any previous visit (from the past 24 hour(s)).        Assessment/Plan:  82-year-old male known " to Dr. Hairston with past medical history of lung cancer, esophageal cancer, and remote prostate cancer- unclear if these are metastasis- s/p 30 fractions of radiation therapy followed by radiation oncologist to the ER on 07/21 with dysphagia and choking with p.o. intake.    Oropharyngeal Dysphagia s/p rx for esophageal cancer  - s/p attempted PEG tube placement 07/24- Unable to place secondary to benign-appearing esophageal stenosis- dilated.  Esophageal mucosal variant, biopsied.  Remainder of the exam without abnormalities.   - ok to resume Plavix at prior dose today from a GI standpoint  - will plan for repeat EGD in 3-4 weeks for further dilation consideration ( ideally would need to hold plavix 5 day prior)  - if patient warrants PEG tube on this admit, would need to be surgically placed as unable to place endoscopically     GI will sign off at this time with no further recommendations.  Please call or page with any questions.     Neetu Bernstein PA-C  Gastroenterology  [facility]

## 2023-07-25 NOTE — CONSULTS
Acute Care Surgery   Consult Note    Patient Name: Ezequiel Merchant  YOB: 1940  Date: 07/25/2023 4:44 PM  Date of Admission: 7/21/2023  HD#3  POD#1 Day Post-Op    PRESENTING HISTORY   Chief Complaint/Reason for Admission: G-tube placement    History of Present Illness:      Patient is an 82-year-old male with a history of hypertension, prostate cancer, esophageal cancer, lung cancer who presented to the ED on 07/21 with difficulty swallowing.  The patient states that the he is had increasing difficulty in his aspirated while eating.  The patient has no additional complaints.  He otherwise feels well.  The patient typically takes Plavix, however it has been held since admission on 07/21.  GI attempted PEG tube placement on 07/24 but was unable to place due to stenosis of esophagus. General surgery was consulted for G-tube placement.    Of note, the patient has had no abdominal surgeries.      Review of Systems:  12 point ROS negative except as stated in HPI    PAST HISTORY:   Past medical history:  Past Medical History:   Diagnosis Date    CAD in native artery     Dementia     DJD (degenerative joint disease)     GERD (gastroesophageal reflux disease)     Hallux valgus (acquired), unspecified foot     Hammer toe, acquired     Hx of cancer of lung     Hypercholesteremia     Hypertension     Lung cancer     Onychomycosis of toenail     PAD (peripheral artery disease)     PVD (peripheral vascular disease)     Tinea pedis     Tobacco user     Unspecified dementia without behavioral disturbance        Past surgical history:  Past Surgical History:   Procedure Laterality Date    BACK SURGERY      CORONARY STENT PLACEMENT      DEBRIDEMENT OF FOOT Left     EGD, WITH BALLOON DILATION OF LESS THAN 30 MILLIMETERS  7/24/2023    Procedure: EGD, WITH BALLOON DILATION OF LESS THAN 30 MILLIMETERS;  Surgeon: Branden Ruffin MD;  Location: [facility] ENDOSCOPY;  Service: Gastroenterology;;  [facility]  dilation 6.5mm only    EGD, WITH CLOSED BIOPSY  7/24/2023    Procedure: EGD, WITH CLOSED BIOPSY;  Surgeon: Branden Ruffin MD;  Location: [facility] ENDOSCOPY;  Service: Gastroenterology;;    ESOPHAGOGASTRODUODENOSCOPY N/A 08/02/2022    Procedure: EGD w/ dil;  Surgeon: Pollo Hairston MD;  Location: [facility] ENDOSCOPY;  Service: Gastroenterology;  Laterality: N/A;    gastrointestinal biopsy      TOE AMPUTATION Left        Family history:  Family History   Problem Relation Age of Onset    Stroke Mother     Diabetes Father        Social history:  Social History     Socioeconomic History    Marital status:    Tobacco Use    Smoking status: Every Day     Packs/day: 1.00     Types: Cigarettes    Smokeless tobacco: Never   Substance and Sexual Activity    Alcohol use: Never    Drug use: Never     Social History     Tobacco Use   Smoking Status Every Day    Packs/day: 1.00    Types: Cigarettes   Smokeless Tobacco Never      Social History     Substance and Sexual Activity   Alcohol Use Never        MEDICATIONS & ALLERGIES:     No current facility-administered medications on file prior to encounter.     Current Outpatient Medications on File Prior to Encounter   Medication Sig    amLODIPine (NORVASC) 5 MG tablet Take 5 mg by mouth once daily.    atorvastatin (LIPITOR) 20 MG tablet Take 20 mg by mouth once daily.    carvediloL (COREG) 3.125 MG tablet Take 3.125 mg by mouth 2 (two) times daily with meals.    clopidogreL (PLAVIX) 75 mg tablet Take 75 mg by mouth once daily.    donepeziL (ARICEPT) 10 MG tablet Take 10 mg by mouth every evening.    fluconazole (DIFLUCAN) 100 MG tablet Take 100 mg by mouth once daily.    fluticasone propionate (FLOVENT HFA) 220 mcg/actuation inhaler Inhale into the lungs 2 (two) times daily. Controller    levETIRAcetam (KEPPRA) 500 MG Tab Take 1 tablet (500 mg total) by mouth 2 (two) times daily.    omeprazole (PRILOSEC) 20 MG capsule Take by mouth once daily.    sacubitriL-valsartan  "(ENTRESTO) 49-51 mg per tablet Take 1 tablet by mouth 2 (two) times daily.    albuterol (PROVENTIL/VENTOLIN HFA) 90 mcg/actuation inhaler Inhale 2 puffs into the lungs every 6 (six) hours as needed for Wheezing. Rescue       Allergies: Review of patient's allergies indicates:  No Known Allergies    Scheduled Meds:   famotidine (PF)  20 mg Intravenous Daily    levETIRAcetam (Keppra) IV (PEDS and ADULTS)  500 mg Intravenous Q12H       Continuous Infusions:   electrolyte-A         PRN Meds:albuterol, hydrALAZINE, labetalol, melatonin, sodium chloride 0.9%    OBJECTIVE:   Vital Signs:  VITAL SIGNS: 24 HR MIN & MAX LAST   Temp  Min: 97.7 °F (36.5 °C)  Max: 99 °F (37.2 °C)  98.2 °F (36.8 °C)   BP  Min: 99/64  Max: 144/77  (!) 143/82    Pulse  Min: 69  Max: 80  70    Resp  Min: 16  Max: 18  16    SpO2  Min: 93 %  Max: 99 %  98 %      HT: 5' 5" (165.1 cm)  WT: 54.4 kg (120 lb)  BMI:       Intake/output:  Intake/Output - Last 3 Shifts         07/23 0700 07/24 0659 07/24 0700 07/25 0659 07/25 0700 07/26 0659    P.O.   360    IV Piggyback  100     TPN  900     Total Intake(mL/kg)  1000 (18.4) 360 (6.6)    Urine (mL/kg/hr) 1200 (0.9) 775 (0.6) 500 (0.9)    Stool  0     Total Output 1200 775 500    Net -1200 +225 -140           Urine Occurrence 3 x 1 x     Stool Occurrence  0 x             Intake/Output Summary (Last 24 hours) at 7/25/2023 1644  Last data filed at 7/25/2023 1439  Gross per 24 hour   Intake 1260 ml   Output 1275 ml   Net -15 ml         Physical Exam:  General: NAD; cachectic-appearing  HEENT: Normocephalic, atraumatic, PERRL  CV: RR  Resp: NWOB on RA  GI:  Abdomen soft, non-tender, non-distended.  :  Deferred  MSK: moves all extremities spontaneously and purposefully  Skin/wounds:  No rashes, ulcers, erythema  Neuro:  CNII-XII grossly intact, alert and oriented to person, place, and time    Labs:  Troponin:  No results for input(s): TROPONINI in the last 72 hours.  CBC:  Recent Labs     07/24/23  0322   WBC " 3.94*   RBC 5.28   HGB 12.3*   HCT 39.1*      MCV 74.1*   MCH 23.3*   MCHC 31.5*     CMP:  Recent Labs     07/24/23  0322   CALCIUM 8.4*      K 4.2   CO2 23   BUN 20.8   CREATININE 1.00     Lactic Acid:  No results for input(s): LACTATE in the last 72 hours.  ETOH:  No results for input(s): ETHANOL in the last 72 hours.   Urine Drug Screen:  No results for input(s): COCAINE, OPIATE, BARBITURATE, AMPHETAMINE, FENTANYL, CANNABINOIDS, MDMA in the last 72 hours.    Invalid input(s): BENZODIAZEPINE, PHENCYCLIDINE   ABG:  No results for input(s): PH, PCO2, PO2, HCO3, BE, POCSATURATED in the last 72 hours.    Diagnostic Results:  Fl Modified Barium Swallow Speech   Final Result      X-Ray Chest 1 View   Final Result      No acute cardiopulmonary process.         Electronically signed by: Rakesh Mena   Date:    07/21/2023   Time:    14:56          ASSESSMENT & PLAN:    82-year-old male with esophageal cancer history an esophageal stenosis who presented to the hospital on 07/21 with dysphagia.  General surgery team consulted for G-tube placement.      - patient's booked and consented for laparoscopic G-tube placement tomorrow, 7/26.  - all risks, benefits, and alternatives to surgery discussed.  All questions answered.  - please continue to hold Plavix  - NPO at midnight  - preoperative Ancef ordered      Ирина Pal MD   [employer] General Surgery, PGY-2

## 2023-07-26 NOTE — ANESTHESIA PREPROCEDURE EVALUATION
07/26/2023  Ezequiel Merchant is a 82 y.o., male past medical history of lung cancer, esophageal cancer, and remote prostate cancer the.  Unclear if these are metastasis were  diagnoses with.  He has not followed by Oncology as an outpatient.  Never received chemotherapy.  He did complete 30 fractions of radiation therapy is still followed by the radiation oncologist here at [facility].  He presents to the ER on 07/21 with worsening swallowing.  He is now choking on all liquids and medications. He presents for Laparoscopic gastrostomy placement.    Other Medical History   Hypertension Lung cancer   Hypercholesteremia Unspecified dementia without behavioral disturbance   CAD in native artery Dementia   DJD (degenerative joint disease) GERD (gastroesophageal reflux disease)   Hallux valgus (acquired), unspecified foot Hammer toe, acquired   Hx of cancer of lung Onychomycosis of toenail   PAD (peripheral artery disease) PVD (peripheral vascular disease)   Tinea pedis Tobacco user     Surgical History    BACK SURGERY ESOPHAGOGASTRODUODENOSCOPY   DEBRIDEMENT OF FOOT TOE AMPUTATION   CORONARY STENT PLACEMENT gastrointestinal biopsy   EGD, WITH CLOSED BIOPSY EGD, WITH BALLOON DILATION OF LESS THAN 30 MILLIMETERS       Pre-op Assessment    I have reviewed the Patient Summary Reports.     I have reviewed the Nursing Notes. I have reviewed the NPO Status.   I have reviewed the Medications.     Review of Systems  Anesthesia Hx:  No problems with previous Anesthesia    Social:  Non-Smoker    Cardiovascular:   Hypertension CAD      Hepatic/GI:   GERD    Musculoskeletal:   Arthritis     Neurological:  Dementia    Psych:   Psychiatric History          Physical Exam  General: Cooperative, Alert, Oriented and Cachexia    Airway:  Mallampati: III   Mouth Opening: Normal  TM Distance: Normal  Neck ROM:  Normal ROM    Dental:  Edentulous    Chest/Lungs:  Clear to auscultation, Normal Respiratory Rate    Heart:  Rate: Normal  Rhythm: Regular Rhythm  Sounds: Normal    Abdomen:  Normal, Soft, Nontender        Anesthesia Plan  Type of Anesthesia, risks & benefits discussed:    Anesthesia Type: Gen ETT  Intra-op Monitoring Plan: Standard ASA Monitors  Post Op Pain Control Plan: multimodal analgesia  Induction:  IV  Airway Plan: Direct  Informed Consent: Informed consent signed with the Patient and all parties understand the risks and agree with anesthesia plan.  All questions answered.   ASA Score: 4  Day of Surgery Review of History & Physical: H&P Update referred to the surgeon/provider.    Ready For Surgery From Anesthesia Perspective.     .

## 2023-07-26 NOTE — ANESTHESIA PROCEDURE NOTES
Intubation    Date/Time: 7/26/2023 12:31 PM  Performed by: Noah Auguste CRNA  Authorized by: Baldemar Parks DO     Intubation:     Induction:  Intravenous    Intubated:  Postinduction    Mask Ventilation:  Easy with oral airway    Attempts:  1    Attempted By:  Student    Method of Intubation:  Direct    Blade:  Mateusz 3    Laryngeal View Grade: Grade IIA - cords partially seen      Difficult Airway Encountered?: No      Complications:  None    Airway Device:  Oral endotracheal tube    Airway Device Size:  7.5    Style/Cuff Inflation:  Cuffed (inflated to minimal occlusive pressure)    Inflation Amount (mL):  6    Tube secured:  22    Secured at:  The lips    Placement Verified By:  Capnometry    Complicating Factors:  None    Findings Post-Intubation:  BS equal bilateral     Physical Therapy Evaluation    Visit Type: Daily Treatment Note  Visit: 4  Referring Provider: BRANDON Perez  Medical Diagnosis (from order): Diagnosis Information    Diagnosis  843.8 (ICD-9-CM) - S73.191A (ICD-10-CM) - Acetabular labrum tear, right, initial encounter       Treatment Diagnosis: right hip - increased pain/symptoms, impaired mobility, impaired strength, impaired gait and increased risk for falls.  Onset  - Date of Surgery:  5/25/2022  - Procedure: Cataract Extraction With Intraocular Lens Implant, Right Eye - Right  Patient alert and oriented X3.  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  No date: Essential (primary) hypertension  No date: High cholesterol  No date: Sleep apnea      Comment:  NO CPAP     ALLERGIES:   -- Hydromorphone -- Nausea & Vomiting   -- Morphine Sulfate -- Nausea & Vomiting    --     -- Oxycodone -- Nausea & Vomiting   -- Azithromycin -- Nausea & Vomiting    --     -- Codeine -- Nausea & Vomiting   -- Doxycycline -- Other (See Comments)    --  Loss of taste and smell   -- Gluten Meal   (Food Or Med) -- Other (See Comments)    --  Indigestion   -- Hydrocodone -- Nausea & Vomiting   -- Levofloxacin -- Nausea & Vomiting    --     -- Oxycontin -- Nausea & Vomiting   -- Sulfa Antibiotics -- RASH   -- Tramadol -- NAUSEA    --  Cerner Allergy Text Annotation: traMADol     Current Outpatient Medications:  prednisolone-moxifloxacin-nepafenac (PRED-MOXI-NEPAF) 1-0.5-0.1 % (IMPRIMIS RX) ophth susp, Place 1 drop into left eye 3 times daily. RPh: A compounded product from [facility]., Disp: , Rfl:   CALCIUM PO, Take by mouth daily., Disp: , Rfl:   acetaminophen (TYLENOL) 325 MG tablet, Take 2 tablets by mouth every 4 hours as needed for Pain., Disp: , Rfl:   ASCORBIC ACID PO, Take 1 tablet by  mouth daily., Disp: , Rfl:   famotidine (PEPCID) 40 MG tablet, Take 40 mg by mouth nightly. , Disp: , Rfl:   fluticasone (FLONASE) 50 MCG/ACT nasal spray, Spray 1 spray in each nostril 2 times daily as needed. , Disp: , Rfl:   meloxicam (MOBIC) 15 MG tablet, Take 15 mg by mouth daily as needed for Pain (back pain)., Disp: , Rfl:   estrogens, conjugated (PREMARIN) vaginal cream, Place vaginally 2 days a week., Disp: , Rfl:   solifenacin (VESICARE) 5 MG tablet, Take 5 mg by mouth every morning. , Disp: , Rfl:   VITAMIN D PO, Take 1 tablet by mouth daily. MANNOSE, Disp: , Rfl:   magnesium 250 MG tablet, Take 250 mg by mouth daily., Disp: , Rfl:   Multiple Vitamins-Minerals (vitamin - therapeutic multivitamins w/minerals) tablet, Take 1 tablet by mouth daily. , Disp: , Rfl:   Glucosamine-Chondroit-Vit C-Mn (GLUCOSAMINE 1500 COMPLEX PO), Take 2 tablets by mouth daily. , Disp: , Rfl:   B Complex-C (SUPER B COMPLEX PO), Take 1 tablet by mouth daily. , Disp: , Rfl:   lisinopril (ZESTRIL) 5 MG tablet, Take 5 mg by mouth every morning. , Disp: , Rfl:   PRAVASTATIN SODIUM PO, Take 60 mg by mouth at bedtime. , Disp: , Rfl:   amphetamine-dextroamphetamine (Adderall) 5 MG tablet, Take 5 mg by mouth On call as needed (for excessive sleepiness if driving long distance). , Disp: , Rfl:   baclofen (LIORESAL) 10 MG tablet, Take 10 mg by mouth daily as needed (for back). , Disp: , Rfl:     No current facility-administered medications for this encounter.         SUBJECTIVE                                                                                                               Chief complaint: anterolateral R hip / R acetabular labral tear while at a ballet on 12/21/22. Went to the rest room, and felt a sharp pain when sitting on the toilet and then was difficult to bear any weight on the RLE. Used meloxicam and ice for pain so after a few days it became more mild. Notices most pain and difficulty with stairs and coming to standing  from sitting on low surfaces. Currently uses recumbant bike at [employer] for exercise, and does very mild aerobics.    Imaging:  Impressions:   1. There are complex tears involving the lateral acetabular labrum as well as the superior/anterior acetabular labrum.     2. The articular cartilage of the right hip appears unremarkable.     3. Moderately severe gluteus minimus tendinitis is noted.   4. Moderate peritendinitis involves the gluteus minimus and gluteus medius.     5. Mild common hamstring tendinitis is noted at its ischial tuberosity origin.      4/04/2023:  Patient states that going up and down the stairs and getting up from the toilet.      Function:   Limitations / Exacerbation Factors:   - bed mobility, house/yard work, standing tasks, lifting/carrying, squatting/lifting and pushing/pulling, low transfer (toilet/couch), community distances, stairs  Prior Level of Function: pain free ADLs and IADLs,    Patient Goals: increased strength, decreased pain, increased motion and return to sport/leisure activities.    Prior treatment  - no therapies  - Discharged from hospital, home health, or skilled nursing facility in last 30 days: no  Home Environment   - Patient lives with: significant other  - Assistance available: consistent  - Reported 2 or more falls or an unexplained fall with injury in the last year.  - patient currently being seen in PT      OBJECTIVE                                                                                                                     Range of Motion (ROM)   (degrees unless noted; active unless noted; norms in ( ); negative=lacking to 0, positive=beyond 0)  Hip:   - Flexion (100-120):      • Left: 128       • Right: 120    - Internal Rotation (45-50):     - in sitting:        • Left:  39         • Right:  36    - External Rotation (45-50):     - in sitting:        • Left:  32          • Right:  34      Strength  (out of 5 unless noted, standard test position unless  noted)   Hip:    - Flexion:        • Left: 5        • Right: 5    - Extension:        • Left: 4-        • Right: 4-    - Abduction:        • Left: 3+        • Right: 3+    - Internal Rotation:        • Left: 5        • Right: 5    - External Rotation:        • Left: 5        • Right: 5          Muscle Length Tests  - 90/90 Hamstring at knee:  - Left:  66° - Right: 77°      Balance Tests   5 Times Sit to Stand 15 sec    Norms: 70-79 year old - 4.5 -15.5 sec    Interpretation:        > 12 seconds indicates need for further assessment for fall risk for community dwelling elderly      > 16 seconds indicative of falls risk for Parkinson's disease          Outcome/Assessments  Outcome Measures:   Lower Extremity Functional Scale: LEFS Calculated Total: 54 (0=extreme difficulty; 80=no difficulty) see flowsheet for additional documentation    Treatment     Therapeutic Exercise  See HEP 1 exercise  Bridges  Short arc quad.    Supine hip extension into the ball  Supine leg off knee extension  SLR initial range.     Manual Therapy   Soft tissue mobilization: tfl, quad, it band, hamstring.     Skilled input: verbal instruction/cues, tactile instruction/cues and posture correction    Writer verbally educated and received verbal consent for hand placement, positioning of patient, and techniques to be performed today from patient for hand placement and palpation for techniques, therapist position for techniques and clothing adjustments for techniques as described above and how they are pertinent to the patient's plan of care.    Home Exercise Program  Access Code: [email or web address]  URL: [email or web address]  Date: 04/04/2023  Prepared by: Musa Crespo    Exercises  - Hooklying Gluteal Sets  - 1 x daily - 7 x weekly - 3 sets - 10 reps  - Clamshell  - 1-2 x daily - 7 x weekly - 2 sets - 15 reps  - Supine March with Resistance Band  - 1 x daily - 7 x weekly - 3 sets - 10 reps  - Hooklying Clamshell with Resistance   - 1 x daily - 7 x weekly - 3 sets - 10 reps  - Supine Hip Adduction Isometric with Ball  - 1 x daily - 7 x weekly - 3 sets - 10 reps  - Seated Hip Abduction with Resistance  - 1 x daily - 7 x weekly - 3 sets - 10 reps  - Supine Active Straight Leg Raise  - 1 x daily - 7 x weekly - 3 sets - 10 reps        ASSESSMENT                                                                                                          75 year old patient has reported functional limitations listed above impacted by signs and symptoms consistent with treatment diagnosis below.  Treatment Diagnosis:   - Involved: right hip.  - Symptoms/impairments: increased pain/symptoms, impaired mobility, impaired strength, impaired gait and increased risk for falls.    Pt presents with impaired R hip mobility and is at an increased level of disability per score on LEFS outcome tool post R labral tear. Pt will benefit from skilled physical therapy focused on gradual progression of hip strengthening and stabilization. With lack of skilled physical therapy pt is a risk of further decline in function.     Statement of medical necessity: patient is unable to perform safe functional mobility without limitations due to R hip instability and pain, and requires skilled PT to decrease risk of falls and future injury.    04/04/2023:  Patient demonstrates quad fatigue and soreness , hip extension looked good with increase in Rotation. Updated hep with SLR initial range.     Prognosis: patient will benefit from skilled therapy  Rehabilitative potential is:  Predicted patient presentation: Low (stable) - Patient comorbidities and complexities, as defined above, will have little effect on progress for prescribed plan of care.  Education:   - Present and ready to learn: patient  - Results of above outlined education: Verbalizes understanding and Demonstrates understanding    PLAN                                                                                                                          The following skilled interventions to be implemented to achieve goals listed below:  Gait Training (33226)  Manual Therapy (31835)  Therapeutic Activity (48377)  Neuromuscular Re-Education (07826)  Therapeutic Exercise (83544)  Heat/Cold (56752)    Frequency / Duration  1 times per week tapering as patient progresses for 5 weeks for an estimated total of 5 visits    Patient involved in and agreed to plan of care and goals.  Patient given attendance policy at time of initial evaluation., Patient offered attendance policy at [facility]. and Attendance policy reviewed with patient.    Suggestions for next session as indicated: Safe, hip strengthening  Functional mobility      Goals  Long Term Goals: to be met by end of plan of care  1. Patient will improve score on 5x STS to <12 seconds pain free to indicate decreased risk of falls by end of plan of care  2. Patient will perform sit to stand transfer with minimal limitations due to pain by end of plan of care  3. Patient will ascend and descend 1 flight of steps for with minimal limitations due to pain for safe independent ambulation by end of plan of care  4. Lower Extremity Functional Scale: Patient will complete form to reflect an improved raw score to greater than or equal to 70/80 to indicate patient reported improvement in function/disability/impairment (minimal detectable change: 9 points).      Therapy procedure time and total treatment time can be found documented on the Time Entry flowsheet

## 2023-07-26 NOTE — OP NOTE
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  Patient was taken to the OR and placed supine, endotracheal intubation was successful.  The abdomen was then prepped and draped in sterile fashion.  A time out was completed to confirm correct patient, procedure, and all staff was in agreement.      The Veress needle was introduced through a stab incision in the left upper quadrant and the abdomen was insufflated.  A robotic 8mm port was placed periumbilically and a laparoscope was inserted into the abdomen.  No injuries were noted from entry.  Two additional 8mm ports were placed in the right upper abdomen.  The robot was then docked.  There were omental adhesions in the left upper quadrant which were taken down with blunt and sharp dissection.  The stomach was then visualized, grasped and brought up to the anterior abdominal without issue.  A vicryl suture was then used to secure the stomach to the abdominal wall.  A gastrotomy was made and the gastric tube was introduced into the abdomen by the bedside assistant.  The gastric tube was grasped and placed into the stomach.  The balloon was inflated and confirmed inside the stomach.  Two additional vicryl sutures were used to secure the stomach to the abdominal wall.  The bumper was cinched down onto the abdominal wall.  Robot was  then undocked without issue.  Ports removed and skin closed with monocryl. Patient tolerated procedure, was extubated and transferred to recovery in stable condition.      SURGEON:  Mickey Whaley MD    Date: 7/26/2023  Time: 2:29 PM

## 2023-07-26 NOTE — PLAN OF CARE
Problem: Adult Inpatient Plan of Care  Goal: Plan of Care Review  Outcome: Ongoing, Progressing  Goal: Patient-Specific Goal (Individualized)  Outcome: Ongoing, Progressing  Goal: Absence of Hospital-Acquired Illness or Injury  Outcome: Ongoing, Progressing  Goal: Optimal Comfort and Wellbeing  Outcome: Ongoing, Progressing  Goal: Readiness for Transition of Care  Outcome: Ongoing, Progressing     Problem: Fall Injury Risk  Goal: Absence of Fall and Fall-Related Injury  Outcome: Ongoing, Progressing     Problem: Coping Ineffective  Goal: Effective Coping  Outcome: Ongoing, Progressing      [place] [employer] at bedside.

## 2023-07-26 NOTE — PT/OT/SLP PROGRESS
Pt/family opting hospice care with surgical PEG placement prior to discharge.  Skilled SLP services no longer warranted, please reconsult as needed.

## 2023-07-26 NOTE — TRANSFER OF CARE
"Anesthesia Transfer of Care Note    Patient: Ezequiel Merchant    Procedure(s) Performed: Procedure(s) (LRB):  ROBOTIC INSERTION, GASTROSTOMY TUBE (N/A)    Patient location: PACU    Anesthesia Type: general    Transport from OR: Transported from OR on room air with adequate spontaneous ventilation    Post pain: adequate analgesia    Post assessment: no apparent anesthetic complications    Post vital signs: stable    Level of consciousness: responds to stimulation    Nausea/Vomiting: no nausea/vomiting    Complications: none    Transfer of care protocol was followed      Last vitals:   Visit Vitals  BP (!) 165/95   Pulse 84   Temp 36.6 °C (97.9 °F) (Oral)   Resp 13   Ht 5' 5" (1.651 m)   Wt 54.4 kg (120 lb)   SpO2 96%   BMI 19.97 kg/m²     "

## 2023-07-26 NOTE — ANESTHESIA POSTPROCEDURE EVALUATION
Anesthesia Post Evaluation    Patient: Ezequiel Merchant    Procedure(s) Performed: Procedure(s) (LRB):  ROBOTIC INSERTION, GASTROSTOMY TUBE (N/A)    Final Anesthesia Type: general      Patient location during evaluation: PACU  Patient participation: Yes- Able to Participate  Level of consciousness: awake and alert  Post-procedure vital signs: reviewed and stable  Pain management: adequate  Airway patency: patent  RIANA mitigation strategies: Multimodal analgesia  PONV status at discharge: No PONV  Anesthetic complications: no      Cardiovascular status: hemodynamically stable  Respiratory status: unassisted  Hydration status: euvolemic  Follow-up not needed.          Vitals Value Taken Time   /85 07/26/23 1430   Temp 36.6 °C (97.9 °F) 07/26/23 1357   Pulse 74 07/26/23 1431   Resp 14 07/26/23 1431   SpO2 100 % 07/26/23 1431   Vitals shown include unvalidated device data.      Event Time   Out of Recovery 07/26/2023 14:32:07         Pain/Beth Score: Beth Score: 10 (7/26/2023  2:00 PM)

## 2023-07-26 NOTE — PLAN OF CARE
SW met with patient's significant other, Patience and discussed hospice order received. Patience reports understanding and verbalized agreement to hospice services at discharge. SW then provided a list of providers. CARLOS also inquired about POA documents as noted by Dr. Christiansen. Miss Morin stated she will bring the requested documents today following her therapy at 3 pm. SW said okay and added this documents can be scanned into patient's electronic chart. All questions answered. No barriers to discharge at this time.

## 2023-07-26 NOTE — PLAN OF CARE
Problem: Adult Inpatient Plan of Care  Goal: Plan of Care Review  Outcome: Ongoing, Progressing  Goal: Patient-Specific Goal (Individualized)  Outcome: Ongoing, Progressing  Goal: Absence of Hospital-Acquired Illness or Injury  Outcome: Ongoing, Progressing  Goal: Optimal Comfort and Wellbeing  Outcome: Ongoing, Progressing  Goal: Readiness for Transition of Care  Outcome: Ongoing, Progressing     Problem: Fall Injury Risk  Goal: Absence of Fall and Fall-Related Injury  Outcome: Ongoing, Progressing     Problem: Coping Ineffective  Goal: Effective Coping  Outcome: Ongoing, Progressing

## 2023-07-26 NOTE — PROGRESS NOTES
[facility] [place] [facility]  Hospital Medicine Progress Note        Chief Complaint: Inpatient Follow-up     HPI:   82-year-old male with past medical history of lung cancer, esophageal cancer, and remote prostate cancer the.  Unclear if these are metastasis were  diagnoses with.  He has not followed by Oncology as an outpatient.  Never received chemotherapy.  He did complete 30 fractions of radiation therapy is still followed by the radiation oncologist here at [facility].  He presents to the ER on 07/21 with worsening swallowing.  He is now choking on all liquids and medications.  The is a family friend bedside he states that they have seen GI in the past and has had to have an esophageal dilation.  She thinks it was about a year ago.  On review of the chart it looks like he was in August of 2022.  She states that they recently saw their primary care physician who was recommending PEG tube due to he did some better nutrition.  He appears cachectic but friend states that he still pretty active and is able to visit with family.  Denies any current pain.  On conversations of his goals of care they both state that they have never considered hospice.  He did have palliative care visit with them about a year ago but decided that he was not ready for it.  They are more open to it at this point.  They both report that he would like the PEG tube to try to improve his quality of life.  We discussed code status.  They have not made any considerations in this area.  Will remain full code for now but will continues conversations through the weekend.  Patient was taken to GI lab on 07/24 for PEG. They were able to do some esophageal dilations but no PEG was placed.  Returned to the floor GI recommending full liquid diet.  On the floor he continues to choke on oral.     Interval Hx:  Patient was NPO this morning.  Was evaluated by speech therapy yesterday and still with significant aspiration.   Palliative Care evaluated and discussed treatment options with Ms. Morin and the patient.  She has come to terms and is agreeable to hospice care upon discharge.  They do want him to get his PEG tube.  General surgery was consulted yesterday afternoon they plan to take him for surgical placement of PEG tube today.     Objective/physical exam:  General: In no acute distress, afebrile  Chest: Clear to auscultation bilaterally  Heart: RRR, +S1, S2, no appreciable murmur  Abdomen: Soft, nontender, BS +  MSK: Warm, no lower extremity edema, no clubbing or cyanosis  Neurologic: Alert and oriented x4, Cranial nerve II-XII intact, Strength 5/5 in all 4 extremities    VITAL SIGNS: 24 HRS MIN & MAX LAST   Temp  Min: 97.6 °F (36.4 °C)  Max: 98.3 °F (36.8 °C) 98.3 °F (36.8 °C)   BP  Min: 112/67  Max: 143/82 112/67   Pulse  Min: 68  Max: 72  68   Resp  Min: 16  Max: 20 20   SpO2  Min: 98 %  Max: 99 % 98 %       Recent Labs   Lab 07/22/23  0406 07/24/23  0322 07/26/23  0620   WBC 4.64 3.94* 4.20*   RBC 4.98 5.28 5.05   HGB 11.6* 12.3* 11.8*   HCT 37.3* 39.1* 37.2*   MCV 74.9* 74.1* 73.7*   MCH 23.3* 23.3* 23.4*   MCHC 31.1* 31.5* 31.7*   RDW 14.4 14.1 14.2    241 221   MPV 10.2 10.5* 10.7*       Recent Labs   Lab 07/21/23  1125 07/22/23  0406 07/24/23  0322 07/25/23  1119 07/26/23  0620    139 137  --  136   K 4.2 3.7 4.2  --  4.9   CO2 27 25 23  --  23   BUN 22.6 20.9 20.8  --  20.8   CREATININE 1.38* 0.91 1.00  --  1.02   CALCIUM 9.3 8.6* 8.4*  --  8.5*   MG  --   --   --  2.30  --    ALBUMIN 3.2*  --   --   --  2.8*   ALKPHOS 72  --   --   --  58   ALT 9  --   --   --  7   AST 16  --   --   --  16   BILITOT 0.6  --   --   --  0.6          Microbiology Results (last 7 days)       ** No results found for the last 168 hours. **             See below for Radiology    Scheduled Med:   famotidine (PF)  20 mg Intravenous Daily    levETIRAcetam (Keppra) IV (PEDS and ADULTS)  500 mg Intravenous Q12H        Continuous  Infusions:   Amino acid 4.25% - dextrose 5% (CLINIMIX-E) solution (1L provides 42.5 gm AA, 50 gm CHO (170 kcal/L dextrose), Na 35, K 30, Mg 5, Ca 4.5, Acetate 70, Cl 39, Phos 15) 75 mL/hr at 07/26/23 0159    electrolyte-A          PRN Meds:  albuterol, hydrALAZINE, labetalol, melatonin, sodium chloride 0.9%       Assessment/Plan:   Esophageal cancer with obstructing mass   Severe protein calorie malnutrition  Oropharyngeal dysphagia   AMNA  IVVD  Anemia of malignancy  History of:  Lung cancer, prostate cancer, hypertension, seizure disorder, dementia, coronary artery disease, chronic systolic heart failure      Continue with Clinimix and IV Keppra for now.  He was going for PEG tube placement with general surgery this morning.    Once given the all cleared will start him back on enteral formulations of his medications.    Suspect he will be started on trickle feeds tomorrow morning versus this evening.  Once he was tolerating tube feeds can start working on hospice at home and discharge planning.        All diagnosis and differential diagnosis have been reviewed; assessment and plan has been documented; I have personally reviewed the labs and test results that are presently available; I have reviewed the patients medication list; I have reviewed the consulting providers response and recommendations. I have reviewed or attempted to review medical records based upon their availability    All of the patient's questions have been  addressed and answered. Patient's is agreeable to the above stated plan. I will continue to monitor closely and make adjustments to medical management as needed.  _____________________________________________________________________      Radiology:  Fl Modified Barium Swallow Speech  See procedure notes from Speech Pathologist.    This procedure was auto-finalized.      Eugene Leo MD   07/26/2023

## 2023-07-26 NOTE — BRIEF OP NOTE
[facility] - Oncology Acute  Operative Note    Surgery Date: 7/26/2023     Surgeon(s) and Role:     * Mickey Whaley MD - Primary     * Oliver Ruffin MD - Assisting     * Hardik Cohn MD - Assisting     Pre-op Diagnosis:  dysphagia    Post-op Diagnosis:  dysphagia    Procedure(s) (LRB):  ROBOTIC INSERTION, GASTROSTOMY TUBE (N/A)    Anesthesia: General    Operative Findings:   Omental adhesions  Robotic gastrostomy tube  Tube at 2cm at the skin    Technique:  See operative not for procedure in detail     Dr. hWaley was scrubbed and present for critical portions and present throughout the entirety of the case.     Estimated Blood Loss: none         Specimens:   Specimen (24h ago, onward)      None            [id number]    Hardik Cohn MD  07/26/2023 2:02 PM

## 2023-07-26 NOTE — PROGRESS NOTES
"   Acute Care Surgery   Progress Note  Admit Date: 7/21/2023  HD#4  POD#2 Days Post-Op    Subjective:   Interval history:  AFVSS  Pt has been NPO since mn. No N/V.   No complaints this am   Denies abdominal pain, fever, chills, pain    Home Meds:  Current Outpatient Medications   Medication Instructions    albuterol (PROVENTIL/VENTOLIN HFA) 90 mcg/actuation inhaler 2 puffs, Inhalation, Every 6 hours PRN, Rescue    amLODIPine (NORVASC) 5 mg, Oral, Daily    atorvastatin (LIPITOR) 20 mg, Oral, Daily    carvediloL (COREG) 3.125 mg, Oral, 2 times daily with meals    clopidogreL (PLAVIX) 75 mg, Oral, Daily    donepeziL (ARICEPT) 10 mg, Oral, Nightly    fluconazole (DIFLUCAN) 100 mg, Oral, Daily    fluticasone propionate (FLOVENT HFA) 220 mcg/actuation inhaler Inhalation, 2 times daily, Controller    levETIRAcetam (KEPPRA) 500 mg, Oral, 2 times daily    omeprazole (PRILOSEC) 20 MG capsule Oral, Daily    sacubitriL-valsartan (ENTRESTO) 49-51 mg per tablet 1 tablet, Oral, 2 times daily      Scheduled Meds:   famotidine (PF)  20 mg Intravenous Daily    levETIRAcetam (Keppra) IV (PEDS and ADULTS)  500 mg Intravenous Q12H     Continuous Infusions:   Amino acid 4.25% - dextrose 5% (CLINIMIX-E) solution (1L provides 42.5 gm AA, 50 gm CHO (170 kcal/L dextrose), Na 35, K 30, Mg 5, Ca 4.5, Acetate 70, Cl 39, Phos 15) 75 mL/hr at 07/26/23 0159    electrolyte-A       PRN Meds:albuterol, hydrALAZINE, labetalol, melatonin, sodium chloride 0.9%     Objective:     VITAL SIGNS: 24 HR MIN & MAX LAST   Temp  Min: 97.6 °F (36.4 °C)  Max: 99 °F (37.2 °C)  98.3 °F (36.8 °C)   BP  Min: 99/64  Max: 143/82  112/67    Pulse  Min: 68  Max: 75  68    Resp  Min: 16  Max: 20  20    SpO2  Min: 97 %  Max: 99 %  98 %      HT: 5' 5" (165.1 cm)  WT: 54.4 kg (120 lb)  BMI:       Intake/output:  Intake/Output - Last 3 Shifts         07/24 0700 07/25 0659 07/25 0700 07/26 0659 07/26 0700 07/27 0659    P.O.  360     I.V. (mL/kg)  900 (16.5)     IV Piggyback " 100            Total Intake(mL/kg) 1000 (18.4) 1260 (23.2)     Urine (mL/kg/hr) 775 (0.6) 1150 (0.9)     Stool 0      Total Output 775 1150     Net +225 +110            Urine Occurrence 1 x 3 x     Stool Occurrence 0 x              Intake/Output Summary (Last 24 hours) at 7/26/2023 0701  Last data filed at 7/26/2023 0624  Gross per 24 hour   Intake 1260 ml   Output 1150 ml   Net 110 ml           Lines/drains/airway:       Peripheral IV - Single Lumen 07/24/23 2318 Anterior;Right Forearm (Active)   Site Assessment Clean;Intact;Dry 07/26/23 0400   Extremity Assessment Distal to IV No abnormal discoloration;No redness;No swelling;No warmth 07/25/23 2000   Line Status Infusing 07/26/23 0400   Dressing Status Clean;Dry;Intact 07/26/23 0400   Dressing Intervention Integrity maintained 07/26/23 0400   Number of days: 1       Physical examination:  Gen: NAD, AAOx3, answering questions appropriately  HEENT: NCAT  CV: RR  Resp: NWOB on RA  Abd: S/NT/ND. No surgical scars.  Ext: moving all extremities spontaneously and purposefully  Neuro: CN II-XII grossly intact    Labs:  Renal:  Recent Labs     07/24/23 0322   BUN 20.8   CREATININE 1.00     No results for input(s): LACTIC in the last 72 hours.  FENGI:  Recent Labs     07/24/23 0322 07/25/23  1119     --    K 4.2  --    CO2 23  --    CALCIUM 8.4*  --    MG  --  2.30   PHOS  --  3.1     Heme:  Recent Labs     07/24/23 0322   HGB 12.3*   HCT 39.1*        ID:  Recent Labs     07/24/23 0322   WBC 3.94*     CBG:  Recent Labs     07/24/23 0322   GLUCOSE 96      Cardiovascular:  No results for input(s): TROPONINI, CKTOTAL, CKMB, BNP in the last 168 hours.  I have reviewed all pertinent lab results within the past 24 hours.    Imaging:  Fl Modified Barium Swallow Speech   Final Result      X-Ray Chest 1 View   Final Result      No acute cardiopulmonary process.         Electronically signed by: Rakesh Mena   Date:    07/21/2023   Time:    14:56         I  have reviewed all pertinent imaging results/findings within the past 24 hours.    Micro/Path/Other:  Microbiology Results (last 7 days)       ** No results found for the last 168 hours. **           Specimen (168h ago, onward)       Start     Ordered    07/24/23 1323  Specimen to Pathology  RELEASE UPON ORDERING        References:    Click here for ordering Quick Tip   Question:  Release to patient  Answer:  Immediate    07/24/23 1323                     Assessment & Plan:   82-year-old male with esophageal cancer history an esophageal stenosis who presented to the hospital on 07/21 with dysphagia. PEG unable to be placed with GI team. General surgery team consulted for G-tube placement.        - patient's booked and consented for laparoscopic G-tube placement today.   - Hold plavix  - NPO   - preop ancef    Ирина Pal MD  [employer] General Surgery, PGY-2

## 2023-07-27 NOTE — PROGRESS NOTES
Inpatient Nutrition Assessment    Admit Date: 7/21/2023   Total duration of encounter: 6 days     Nutrition Recommendation/Prescription     Advance diet as medically feasible per SLP recommendations  TF recommendations: Fibersource HN @ 65 mL/hr with 30 mL/hr water flush   Kcal: 1560 kcal/day (~96% est kcal needs)  Protein: 70 g/day (~108% est protein needs)  Fluid: 1650 mL/day (~101% est fluid needs)  PPN recommendations if needed.  Recommendations: Clinimix 4.25/5 @ 75 mL/hr *discontinue once 50% of needs are met by TF*  Kcal: 612 kcal/day (~38% est kcal needs)  AA: 77 g/day (~118% est protein needs)  Dextrose: 90 g/day (GIR: 1.14 mg/kg/min)  Fluid: 1800 mL/day (~110% est fluid needs)  Monitor electrolytes and replete as needed  Monitor PO intake, weight, and labs    Communication of Recommendations: reviewed with nurse and reviewed with patient    Nutrition Assessment     Malnutrition Assessment/Nutrition-Focused Physical Exam    Malnutrition Context: chronic illness  Malnutrition Level: severe     Weight Loss (Malnutrition): 20% in 1 year  Subcutaneous Fat (Malnutrition): severe depletion     Upper Arm Region (Subcutaneous Fat Loss): severe depletion  Thoracic and Lumbar Region: severe depletion  Muscle Mass (Malnutrition): severe depletion     Clavicle Bone Region (Muscle Loss): severe depletion  Clavicle and Acromion Bone Region (Muscle Loss): severe depletion  Scapular Bone Region (Muscle Loss): severe depletion                       A minimum of two characteristics is recommended for diagnosis of either severe or non-severe malnutrition.    Chart Review    Reason Seen: continuous nutrition monitoring, physician consult for TF recommendations, and follow-up Aspiration of food, new PPN    Malnutrition Screening Tool Results   Have you recently lost weight without trying?: Yes: Unsure how much  Have you been eating poorly because of a decreased appetite?: Yes   MST Score: 3     Diagnosis:  Esophageal cancer  with obstructing mass   Severe protein calorie malnutrition  Oropharyngeal dysphagia   AMNA  IVVD  Anemia of malignancy  History of:  Lung cancer, prostate cancer, hypertension, seizure disorder, dementia, coronary artery disease, chronic systolic heart failure     Relevant Medical History:    CAD in native artery      Dementia      DJD (degenerative joint disease)      GERD (gastroesophageal reflux disease)      Hallux valgus (acquired), unspecified foot      Hammer toe, acquired      Hx of cancer of lung      Hypercholesteremia      Hypertension      Lung cancer      Onychomycosis of toenail      PAD (peripheral artery disease)      PVD (peripheral vascular disease)      Tinea pedis      Tobacco user      Unspecified dementia without behavioral disturbance        Nutrition-Related Medications: Famotidine, Keppra, Clinimix, Elecrolyte-A infusion @ 50 mL/hr  Calorie Containing IV Medications: Clinimix    Nutrition-Related Labs:  7/24/2023: WBC 3.94, H/H 12.3/39.1, Ca 8.4, Gluc 96  7/26/2023: WBC 4.20, H/H 11.8/37.2, Ca 8.5, Total Protein 5.6, Alb 2.8, Gluc 89    Diet/PN Order: Diet NPO  Oral Supplement Order: none  Tube Feeding Order:  Fibersource @ 65 mL/hr (see below for calculation) new order, not running yet  Appetite/Oral Intake: NPO/NPO  Factors Affecting Nutritional Intake: difficulty/impaired swallowing and NPO  Food/Orthodox/Cultural Preferences: unable to obtain  Food Allergies: unable to obtain       Wound(s):   none noted    Comments    7/25/2023: Pt gone for MBS at time of visit, spoke with nurse. Pt has Clinimix 4.25/5 @ 75 mL/hr ordered. Provided PPN recommendations if needed. Possible Hospice per MD note, PEG placement not probable per EMR. Per EMR, pt weighed 67.6 kg on 8/20/2022 (19% wt loss in 11 months, significant). Last BM documented as 7/22/2023. Will monitor.    7/27/2023: Pt had Clinimix @ 75 mL/hr running at visit. Clinimix order no longer on MAR. Consulted for TF recommendations, provided  "TF recommendations and placed order, spoke with pt about advancement. Performed NFPE. Pt denies N/V. The nurse denies diarrhea. Last BM documented as 2023. Will monitor.    Anthropometrics    Height: 5' 5" (165.1 cm)    Last Weight: 54.4 kg (120 lb) (23 2146) Weight Method: Bed Scale     BMI Classification: underweight (BMI less than 22 if >65 years of age)        Ideal Body Weight (IBW), Male: 136 lb                       Usual Body Weight (UBW), k.6 kg  % Usual Body Weight: 80.69     Usual Weight Provided By: EMR weight history    Wt Readings from Last 5 Encounters:   23 54.4 kg (120 lb)   23 57.2 kg (126 lb)   23 59 kg (130 lb)   10/31/22 58.1 kg (128 lb)   22 67.6 kg (149 lb)     Weight Change(s) Since Admission:  Admit Weight: 54.4 kg (120 lb) (23 1113)  2023: 54.4 kg  2023: no new wts    Estimated Needs    Weight Used For Calorie Calculations: 54.4 kg (119 lb 14.9 oz)  Energy Calorie Requirements (kcal): 4630-3736 (3-35 kcal/kg)  Energy Need Method: Kcal/kg  Weight Used For Protein Calculations: 54.4 kg (119 lb 14.9 oz)  Protein Requirements: 65-82 (1.2-1.5 g/kg)  Fluid Requirements (mL): 1632 (1 mL/kcal)  Temp (24hrs), Av.1 °F (36.7 °C), Min:97.5 °F (36.4 °C), Max:99.3 °F (37.4 °C)       Enteral Nutrition    Patient not receiving enteral nutrition at this time.    Parenteral Nutrition    Patient not receiving parenteral nutrition support at this time.    Evaluation of Received Nutrient Intake    Calories: not meeting estimated needs  Protein: exceeding estimated needs    Patient Education    Not applicable.    Nutrition Diagnosis     PES: Inadequate oral intake related to swallowing difficulty as evidenced by NPO and aspiration of food diagnosis. (continues)    Interventions/Goals     Intervention(s): general/healthful diet, modified composition of parenteral nutrition, and modified rate of parenteral nutrition  Goal: Meet greater than 75% of " nutritional needs by follow-up. (goal progressing)    Monitoring & Evaluation     Dietitian will monitor food and beverage intake, parenteral nutrition intake, weight, electrolyte/renal panel, glucose/endocrine profile, and gastrointestinal profile.  Nutrition Risk/Follow-Up: high (follow-up in 1-4 days)   Please consult if re-assessment needed sooner.

## 2023-07-27 NOTE — PLAN OF CARE
CARLOS met with patient, pt's significant other, Patience and her daughter, Hodan at bedside and followed up on hospice selection. Miss Morin noted she would like [facility] given they are familiar with the representative. CARLOS sent hospice referral via [facility].         Carlos sent referral to [facility] Saleem MOSER Phone: [phone].

## 2023-07-27 NOTE — PLAN OF CARE
Problem: Adult Inpatient Plan of Care  Goal: Plan of Care Review  Outcome: Ongoing, Progressing  Flowsheets (Taken 7/26/2023 1945)  Plan of Care Reviewed With:   patient   spouse  Goal: Patient-Specific Goal (Individualized)  Outcome: Ongoing, Progressing  Goal: Absence of Hospital-Acquired Illness or Injury  Outcome: Ongoing, Progressing  Intervention: Identify and Manage Fall Risk  Flowsheets (Taken 7/26/2023 1945)  Safety Promotion/Fall Prevention:   assistive device/personal item within reach   commode/urinal/bedpan at bedside   Fall Risk reviewed with patient/family   Fall Risk signage in place   family to remain at bedside   high risk medications identified   lighting adjusted   medications reviewed   nonskid shoes/socks when out of bed   instructed to call staff for mobility   side rails raised x 2  Intervention: Prevent Skin Injury  Flowsheets (Taken 7/26/2023 1945)  Body Position: supine  Skin Protection:   adhesive use limited   tubing/devices free from skin contact  Intervention: Prevent and Manage VTE (Venous Thromboembolism) Risk  Flowsheets (Taken 7/26/2023 1945)  Activity Management: Standing - L3  VTE Prevention/Management: ambulation promoted  Range of Motion: active ROM (range of motion) encouraged  Intervention: Prevent Infection  Flowsheets (Taken 7/26/2023 1945)  Infection Prevention:   environmental surveillance performed   equipment surfaces disinfected   hand hygiene promoted   personal protective equipment utilized   rest/sleep promoted   single patient room provided  Goal: Optimal Comfort and Wellbeing  Outcome: Ongoing, Progressing  Intervention: Monitor Pain and Promote Comfort  Flowsheets (Taken 7/26/2023 1945)  Pain Management Interventions:   care clustered   quiet environment facilitated  Intervention: Provide Person-Centered Care  Flowsheets (Taken 7/26/2023 1945)  Trust Relationship/Rapport:   care explained   choices provided   emotional support provided   empathic listening  provided   questions answered   thoughts/feelings acknowledged  Goal: Readiness for Transition of Care  Outcome: Ongoing, Progressing     Problem: Fall Injury Risk  Goal: Absence of Fall and Fall-Related Injury  Outcome: Ongoing, Progressing  Intervention: Identify and Manage Contributors  Flowsheets (Taken 7/26/2023 1945)  Self-Care Promotion:   independence encouraged   BADL personal objects within reach   safe use of adaptive equipment encouraged  Medication Review/Management: medications reviewed  Intervention: Promote Injury-Free Environment  Flowsheets (Taken 7/26/2023 1945)  Safety Promotion/Fall Prevention:   assistive device/personal item within reach   commode/urinal/bedpan at bedside   Fall Risk reviewed with patient/family   Fall Risk signage in place   family to remain at bedside   high risk medications identified   lighting adjusted   medications reviewed   nonskid shoes/socks when out of bed   instructed to call staff for mobility   side rails raised x 2     Problem: Coping Ineffective  Goal: Effective Coping  Outcome: Ongoing, Progressing  Intervention: Support and Enhance Coping Strategies  Flowsheets (Taken 7/26/2023 1945)  Supportive Measures:   active listening utilized   verbalization of feelings encouraged  Family/Support System Care:   involvement promoted   support provided   self-care encouraged  Environmental Support: calm environment promoted

## 2023-07-27 NOTE — PROGRESS NOTES
"   Acute Care Surgery   Progress Note  Admit Date: 7/21/2023  HD#5  POD#1 Day Post-Op    Subjective:   Interval history:  AFVSS  G tube to gravity, 100cc gastric content.  No N/V.   Tube 2 cm at skin  Incision sites c/d/i  No complaints this am   Denies abdominal pain, fever, chills, pain      Home Meds:  Current Outpatient Medications   Medication Instructions    albuterol (PROVENTIL/VENTOLIN HFA) 90 mcg/actuation inhaler 2 puffs, Inhalation, Every 6 hours PRN, Rescue    amLODIPine (NORVASC) 5 mg, Oral, Daily    atorvastatin (LIPITOR) 20 mg, Oral, Daily    carvediloL (COREG) 3.125 mg, Oral, 2 times daily with meals    clopidogreL (PLAVIX) 75 mg, Oral, Daily    donepeziL (ARICEPT) 10 mg, Oral, Nightly    fluconazole (DIFLUCAN) 100 mg, Oral, Daily    fluticasone propionate (FLOVENT HFA) 220 mcg/actuation inhaler Inhalation, 2 times daily, Controller    levETIRAcetam (KEPPRA) 500 mg, Oral, 2 times daily    omeprazole (PRILOSEC) 20 MG capsule Oral, Daily    sacubitriL-valsartan (ENTRESTO) 49-51 mg per tablet 1 tablet, Oral, 2 times daily      Scheduled Meds:   acetaminophen  1,000 mg Intravenous Q8H    famotidine (PF)  20 mg Intravenous Daily    levETIRAcetam (Keppra) IV (PEDS and ADULTS)  500 mg Intravenous Q12H     Continuous Infusions:   electrolyte-A       PRN Meds:albuterol, hydrALAZINE, labetalol, melatonin, sodium chloride 0.9%     Objective:     VITAL SIGNS: 24 HR MIN & MAX LAST   Temp  Min: 97.5 °F (36.4 °C)  Max: 99.3 °F (37.4 °C)  98.5 °F (36.9 °C)   BP  Min: 105/62  Max: 185/101  129/73    Pulse  Min: 70  Max: 87  79    Resp  Min: 13  Max: 21  20    SpO2  Min: 94 %  Max: 100 %  99 %      HT: 5' 5" (165.1 cm)  WT: 54.4 kg (120 lb)  BMI:       Intake/output:  Intake/Output - Last 3 Shifts         07/25 0700 07/26 0659 07/26 0700 07/27 0659 07/27 0700 07/28 0659    P.O. 360      I.V. (mL/kg) 900 (16.5)      IV Piggyback  700     TPN       Total Intake(mL/kg) 1260 (23.2) 700 (12.9)     Urine (mL/kg/hr) 1150 " (0.9) 1450 (1.1)     Stool       Total Output 1150 1450     Net +110 -750            Urine Occurrence 3 x              Intake/Output Summary (Last 24 hours) at 7/27/2023 0709  Last data filed at 7/27/2023 0640  Gross per 24 hour   Intake 700 ml   Output 1450 ml   Net -750 ml             Lines/drains/airway:       Peripheral IV - Single Lumen 07/24/23 2318 Anterior;Right Forearm (Active)   Site Assessment Clean;Intact;Dry 07/26/23 0400   Extremity Assessment Distal to IV No abnormal discoloration;No redness;No swelling;No warmth 07/25/23 2000   Line Status Infusing 07/26/23 0400   Dressing Status Clean;Dry;Intact 07/26/23 0400   Dressing Intervention Integrity maintained 07/26/23 0400   Number of days: 1       Physical examination:  Gen: NAD, AAOx3, answering questions appropriately  HEENT: NCAT  CV: RR  Resp: NWOB on RA  Abd: S/NT/ND. G tube to gravity, 100cc gastric content.  No N/V. Tube 2 cm at skin. Incision sites c/d/i with steri-strips in place  Ext: moving all extremities spontaneously and purposefully  Neuro: CN II-XII grossly intact    Labs:  Renal:  Recent Labs     07/26/23  0620   BUN 20.8   CREATININE 1.02       No results for input(s): LACTIC in the last 72 hours.  FENGI:  Recent Labs     07/25/23  1119 07/26/23  0620   NA  --  136   K  --  4.9   CO2  --  23   CALCIUM  --  8.5*   MG 2.30  --    PHOS 3.1  --    ALBUMIN  --  2.8*   BILITOT  --  0.6   AST  --  16   ALKPHOS  --  58   ALT  --  7       Heme:  Recent Labs     07/26/23  0620   HGB 11.8*   HCT 37.2*          ID:  Recent Labs     07/26/23  0620   WBC 4.20*       CBG:  Recent Labs     07/26/23  0620   GLUCOSE 89        Cardiovascular:  No results for input(s): TROPONINI, CKTOTAL, CKMB, BNP in the last 168 hours.  I have reviewed all pertinent lab results within the past 24 hours.    Imaging:  Fl Modified Barium Swallow Speech   Final Result      X-Ray Chest 1 View   Final Result      No acute cardiopulmonary process.         Electronically  signed by: Rakesh Mena   Date:    07/21/2023   Time:    14:56           I have reviewed all pertinent imaging results/findings within the past 24 hours.    Micro/Path/Other:  Microbiology Results (last 7 days)       ** No results found for the last 168 hours. **           Specimen (168h ago, onward)       Start     Ordered    07/24/23 1323  Specimen to Pathology  RELEASE UPON ORDERING        References:    Click here for ordering Quick Tip   Question:  Release to patient  Answer:  Immediate    07/24/23 1323                     Assessment & Plan:   82-year-old male with esophageal cancer history an esophageal stenosis who presented to the hospital on 07/21 with dysphagia. PEG unable to be placed with GI team. General surgery team consulted for G-tube placement. Now 1 Day Post-Op s/p G tube placement.      - G tube to gravity until 1200  - no feeds or meds by G tube until 1200  - IV tylenol for pain  - Nutrition consult for tube feeds  - Hold plavix  - NPO   - preop vita Cohn MD  [employer] [facility] Surgery HO-1  7:12 AM  07/27/2023

## 2023-07-27 NOTE — PROGRESS NOTES
[facility]  Hospital Medicine Progress Note        Chief Complaint: Inpatient Follow-up for esophageal CA and dysphagia     HPI:   82-year-old male with past medical history of lung cancer, esophageal cancer, and remote prostate cancer the.  Unclear if these are metastasis were  diagnoses with.  He has not followed by Oncology as an outpatient.  Never received chemotherapy.  He did complete 30 fractions of radiation therapy is still followed by the radiation oncologist here at [facility].  He presents to the ER on 07/21 with worsening swallowing.  He is now choking on all liquids and medications.  The is a family friend bedside he states that they have seen GI in the past and has had to have an esophageal dilation.  She thinks it was about a year ago.  On review of the chart it looks like he was in August of 2022.  She states that they recently saw their primary care physician who was recommending PEG tube due to he did some better nutrition.  He appears cachectic but friend states that he still pretty active and is able to visit with family.  Denies any current pain.  On conversations of his goals of care they both state that they have never considered hospice.  He did have palliative care visit with them about a year ago but decided that he was not ready for it.  They are more open to it at this point.  They both report that he would like the PEG tube to try to improve his quality of life.  We discussed code status.  They have not made any considerations in this area.  Will remain full code for now but will continues conversations through the weekend.  Patient was taken to GI lab on 07/24 for PEG. They were able to do some esophageal dilations but no PEG was placed.  Returned to the floor GI recommending full liquid diet.  On the floor he continues to choke on oral. Patient was later seen by surgery team and had Robotic gastrostomy tube placed.   Interval Hx:   Patient  today awake and comfortable. Denies any chest pain, fever or chills.   Family discussing with hospice care and have decided to go home with comfort care.       Objective/physical exam:  General: In no acute distress  Chest: Clear to auscultation bilaterally  Heart: RRR, +S1, S2, no appreciable murmur  Abdomen: Soft, nontender, BS +  Neurologic: Cranial nerve II-XII intact, Strength 5/5 in all 4 extremities    VITAL SIGNS: 24 HRS MIN & MAX LAST   Temp  Min: 97.5 °F (36.4 °C)  Max: 99.3 °F (37.4 °C) 97.6 °F (36.4 °C)   BP  Min: 105/62  Max: 168/91 (!) 152/81   Pulse  Min: 73  Max: 87  76   Resp  Min: 16  Max: 20 18   SpO2  Min: 97 %  Max: 100 % 97 %     I have reviewed the following labs:    Recent Labs   Lab 07/22/23  0406 07/24/23  0322 07/26/23  0620   WBC 4.64 3.94* 4.20*   RBC 4.98 5.28 5.05   HGB 11.6* 12.3* 11.8*   HCT 37.3* 39.1* 37.2*   MCV 74.9* 74.1* 73.7*   MCH 23.3* 23.3* 23.4*   MCHC 31.1* 31.5* 31.7*   RDW 14.4 14.1 14.2    241 221   MPV 10.2 10.5* 10.7*       Recent Labs   Lab 07/21/23  1125 07/22/23  0406 07/24/23  0322 07/25/23  1119 07/26/23  0620    139 137  --  136   K 4.2 3.7 4.2  --  4.9   CO2 27 25 23  --  23   BUN 22.6 20.9 20.8  --  20.8   CREATININE 1.38* 0.91 1.00  --  1.02   CALCIUM 9.3 8.6* 8.4*  --  8.5*   MG  --   --   --  2.30  --    ALBUMIN 3.2*  --   --   --  2.8*   ALKPHOS 72  --   --   --  58   ALT 9  --   --   --  7   AST 16  --   --   --  16   BILITOT 0.6  --   --   --  0.6          Microbiology Results (last 7 days)       ** No results found for the last 168 hours. **             See below for Radiology    Scheduled Med:   acetaminophen  1,000 mg Intravenous Q8H    famotidine (PF)  20 mg Intravenous Daily    levETIRAcetam (Keppra) IV (PEDS and ADULTS)  500 mg Intravenous Q12H        Continuous Infusions:   electrolyte-A          PRN Meds:  albuterol, hydrALAZINE, labetalol, melatonin, sodium chloride 0.9%       Assessment/Plan:  Esophageal cancer with obstructing  mass   Severe protein calorie malnutrition  Oropharyngeal dysphagia   AMNA  IVVD  Anemia of malignancy  History of:  Lung cancer, prostate cancer, hypertension, seizure disorder, dementia, coronary artery disease, chronic systolic heart failure       Plan:  Patient doing well and is s/p Gastrostomy tube placement   Will be started on feedings soon  Seen by hospice care and family decided on home with hospice care   Cont current tx plan     VTE prophylaxis: SCD    Patient condition:  Fair    Anticipated discharge and Disposition:   Home with Hospice       All diagnosis and differential diagnosis have been reviewed; assessment and plan has been documented; I have personally reviewed the labs and test results that are presently available; I have reviewed the patients medication list; I have reviewed the consulting providers response and recommendations. I have reviewed or attempted to review medical records based upon their availability    All of the patient's questions have been  addressed and answered. Patient's is agreeable to the above stated plan. I will continue to monitor closely and make adjustments to medical management as needed.  _____________________________________________________________________    Nutrition Status:    Radiology:  I have personally reviewed the following imaging and agree with the radiologist.     Fl Modified Barium Swallow Speech  See procedure notes from Speech Pathologist.    This procedure was auto-finalized.      Luis Larsen MD   07/27/2023

## 2023-07-28 NOTE — PLAN OF CARE
Problem: Adult Inpatient Plan of Care  Goal: Plan of Care Review  Outcome: Ongoing, Progressing  Flowsheets (Taken 7/27/2023 1949)  Plan of Care Reviewed With:   patient   spouse  Goal: Patient-Specific Goal (Individualized)  Outcome: Ongoing, Progressing  Goal: Absence of Hospital-Acquired Illness or Injury  Outcome: Ongoing, Progressing  Intervention: Identify and Manage Fall Risk  Flowsheets (Taken 7/27/2023 1949)  Safety Promotion/Fall Prevention:   assistive device/personal item within reach   commode/urinal/bedpan at bedside   Fall Risk reviewed with patient/family   Fall Risk signage in place   lighting adjusted   medications reviewed   nonskid shoes/socks when out of bed   side rails raised x 2   instructed to call staff for mobility  Intervention: Prevent Skin Injury  Flowsheets (Taken 7/27/2023 1949)  Body Position: supine  Skin Protection:   adhesive use limited   tubing/devices free from skin contact  Intervention: Prevent and Manage VTE (Venous Thromboembolism) Risk  Flowsheets (Taken 7/27/2023 1949)  Activity Management:   Standing - L3   Sitting at edge of bed - L2  VTE Prevention/Management:   ambulation promoted   dorsiflexion/plantar flexion performed  Range of Motion: active ROM (range of motion) encouraged  Intervention: Prevent Infection  Flowsheets (Taken 7/27/2023 1949)  Infection Prevention:   environmental surveillance performed   equipment surfaces disinfected   personal protective equipment utilized   rest/sleep promoted   single patient room provided  Goal: Optimal Comfort and Wellbeing  Outcome: Ongoing, Progressing  Intervention: Monitor Pain and Promote Comfort  Flowsheets (Taken 7/27/2023 1949)  Pain Management Interventions:   care clustered   quiet environment facilitated  Intervention: Provide Person-Centered Care  Flowsheets (Taken 7/27/2023 1949)  Trust Relationship/Rapport:   care explained   choices provided   emotional support provided   empathic listening provided    questions answered   thoughts/feelings acknowledged  Goal: Readiness for Transition of Care  Outcome: Ongoing, Progressing     Problem: Fall Injury Risk  Goal: Absence of Fall and Fall-Related Injury  Outcome: Ongoing, Progressing  Intervention: Identify and Manage Contributors  Flowsheets (Taken 7/27/2023 1949)  Self-Care Promotion:   independence encouraged   BADL personal objects within reach   safe use of adaptive equipment encouraged  Medication Review/Management: medications reviewed  Intervention: Promote Injury-Free Environment  Flowsheets (Taken 7/27/2023 1949)  Safety Promotion/Fall Prevention:   assistive device/personal item within reach   commode/urinal/bedpan at bedside   Fall Risk reviewed with patient/family   Fall Risk signage in place   lighting adjusted   medications reviewed   nonskid shoes/socks when out of bed   side rails raised x 2   instructed to call staff for mobility     Problem: Coping Ineffective  Goal: Effective Coping  Outcome: Ongoing, Progressing  Intervention: Support and Enhance Coping Strategies  Flowsheets (Taken 7/27/2023 1949)  Supportive Measures:   active listening utilized   verbalization of feelings encouraged  Environmental Support: calm environment promoted

## 2023-07-28 NOTE — DISCHARGE SUMMARY
[facility]  Hospital Medicine Discharge Summary    Admit Date: 7/21/2023  Discharge Date and Time: 7/28/20238:34 AM  Admitting Physician: FANNIE Team  Discharging Physician: Luis Larsen MD.  Primary Care Physician: Jamar Lenz DO  Consults: Gastroenterology    Discharge Diagnoses:  Esophageal cancer with obstructing mass   Severe protein calorie malnutrition  Oropharyngeal dysphagia   AMNA: resolved   IVVD: resolved   Anemia of malignancy  History of:  Lung cancer, prostate cancer, hypertension, seizure disorder, dementia, coronary artery disease, chronic systolic heart failure     Hospital Course:   82-year-old male with past medical history of lung cancer, esophageal cancer, and remote prostate cancer the.  Unclear if these are metastasis were  diagnoses with.  He has not followed by Oncology as an outpatient.  Never received chemotherapy.  He did complete 30 fractions of radiation therapy is still followed by the radiation oncologist here at [facility].  He presents to the ER on 07/21 with worsening swallowing.  He is now choking on all liquids and medications.  The is a family friend bedside he states that they have seen GI in the past and has had to have an esophageal dilation.  She thinks it was about a year ago.  On review of the chart it looks like he was in August of 2022.  She states that they recently saw their primary care physician who was recommending PEG tube due to he did some better nutrition.  He appears cachectic but friend states that he still pretty active and is able to visit with family.  Denies any current pain.  On conversations of his goals of care they both state that they have never considered hospice.  He did have palliative care visit with them about a year ago but decided that he was not ready for it.  They are more open to it at this point.  They both report that he would like the PEG tube to try to improve his quality of life.   We discussed code status.  They have not made any considerations in this area.  Will remain full code for now but will continues conversations through the weekend.    Patient was taken to GI lab on 07/24 for PEG. They were able to do some esophageal dilations but no PEG was placed.  Returned to the floor GI recommending full liquid diet.  On the floor he continues to choke on oral. Patient was later seen by surgery team and had Robotic gastrostomy tube placed.   He was seen by hospice group and Family decided to take patient home with hospice care. He was stated on peg feeding and was tolerating it well. He was discharged home in a stable condition.     Pt was seen and examined on the day of discharge  Vitals:  VITAL SIGNS: 24 HRS MIN & MAX LAST   Temp  Min: 97.6 °F (36.4 °C)  Max: 98.3 °F (36.8 °C) 98 °F (36.7 °C)   BP  Min: 114/78  Max: 152/81 133/81   Pulse  Min: 76  Max: 93  87   Resp  Min: 18  Max: 20 18   SpO2  Min: 97 %  Max: 98 % 98 %       Physical Exam:  General: In no acute distress, frail  Chest: Clear to auscultation bilaterally  Heart: RRR, +S1, S2, no appreciable murmur  Abdomen: Soft, nontender, BS +  Neurologic: Cranial nerve II-XII intact, Strength 5/5 in all 4 extremities    Procedures Performed: No admission procedures for hospital encounter.     Significant Diagnostic Studies: See Full reports for all details    Recent Labs   Lab 07/22/23  0406 07/24/23  0322 07/26/23  0620   WBC 4.64 3.94* 4.20*   RBC 4.98 5.28 5.05   HGB 11.6* 12.3* 11.8*   HCT 37.3* 39.1* 37.2*   MCV 74.9* 74.1* 73.7*   MCH 23.3* 23.3* 23.4*   MCHC 31.1* 31.5* 31.7*   RDW 14.4 14.1 14.2    241 221   MPV 10.2 10.5* 10.7*       Recent Labs   Lab 07/21/23  1125 07/22/23  0406 07/24/23  0322 07/25/23  1119 07/26/23  0620    139 137  --  136   K 4.2 3.7 4.2  --  4.9   CO2 27 25 23  --  23   BUN 22.6 20.9 20.8  --  20.8   CREATININE 1.38* 0.91 1.00  --  1.02   CALCIUM 9.3 8.6* 8.4*  --  8.5*   MG  --   --   --  2.30   --    ALBUMIN 3.2*  --   --   --  2.8*   ALKPHOS 72  --   --   --  58   ALT 9  --   --   --  7   AST 16  --   --   --  16   BILITOT 0.6  --   --   --  0.6        Microbiology Results (last 7 days)       ** No results found for the last 168 hours. **             Fl Modified Barium Swallow Speech  See procedure notes from Speech Pathologist.    This procedure was auto-finalized.         Medication List        CONTINUE taking these medications      albuterol 90 mcg/actuation inhaler  Commonly known as: PROVENTIL/VENTOLIN HFA     carvediloL 3.125 MG tablet  Commonly known as: COREG     donepeziL 10 MG tablet  Commonly known as: ARICEPT     ENTRESTO 49-51 mg per tablet  Generic drug: sacubitriL-valsartan     fluticasone propionate 220 mcg/actuation inhaler  Commonly known as: FLOVENT HFA     levETIRAcetam 500 MG Tab  Commonly known as: KEPPRA  Take 1 tablet (500 mg total) by mouth 2 (two) times daily.     omeprazole 20 MG capsule  Commonly known as: PRILOSEC            STOP taking these medications      amLODIPine 5 MG tablet  Commonly known as: NORVASC     atorvastatin 20 MG tablet  Commonly known as: LIPITOR     clopidogreL 75 mg tablet  Commonly known as: PLAVIX     fluconazole 100 MG tablet  Commonly known as: DIFLUCAN               Explained in detail to the patient about the discharge plan, medications, and follow-up visits. Pt understands and agrees with the treatment plan  Discharge Disposition: Home or Self Care   Discharged Condition: stable  Diet-   Dietary Orders (From admission, onward)       Start     Ordered    07/27/23 1016  Tube Feedings/Formulas 65; 1,300; Fibersource HN; Gastrostomy; 30; Every hour  Continuous        Comments: Per MD, start after 2:00 p.m.  Start at 20 mL/hr, advance by 5 mL every 4-6 hours as tolerated. Monitor electrolytes and replete as needed.   Question Answer Comment   Formula Rate (mL/hr): 65    Feeding Volume (mL): 1300    Select Formula: Fibersource HN    Route: Gastrostomy     Free water flush volume (mL): 30    Free water flush frequency: Every hour        07/27/23 1019    07/26/23 0001  Diet NPO  Diet effective midnight         07/25/23 1652                   Medications Per DC med rec  Activities as tolerated    For further questions contact hospitalist office    Discharge time 33 minutes    For worsening symptoms, chest pain, shortness of breath, increased abdominal pain, high grade fever, stroke or stroke like symptoms, immediately go to the nearest Emergency Room or call 911 as soon as possible.      Luis Eubanks M.D, on 7/28/2023. at 8:34 AM.

## 2023-07-28 NOTE — PLAN OF CARE
Plan of Care    Mr. Merchant is doing well. His pain is controlled. G-tube is appropriately positioned - it is snug against the abdominal wall and bumper can spin freely. Abdominal binder is in place.     Pt is tolerating feeds at 45 mL/h with no problems. Feeds are being advanced to goal.     Surgery team signing off at this time. Please call with any questions or concerns.     Ирина Pal MD   [employer] General Surgery, PGY-2

## 2023-07-28 NOTE — PLAN OF CARE
Problem: Adult Inpatient Plan of Care  Goal: Plan of Care Review  Outcome: Ongoing, Progressing  Goal: Patient-Specific Goal (Individualized)  Outcome: Ongoing, Progressing  Goal: Absence of Hospital-Acquired Illness or Injury  Outcome: Ongoing, Progressing  Intervention: Identify and Manage Fall Risk  Flowsheets (Taken 7/28/2023 1006)  Safety Promotion/Fall Prevention:   assistive device/personal item within reach   bed alarm set   medications reviewed   nonskid shoes/socks when out of bed   side rails raised x 2   instructed to call staff for mobility  Intervention: Prevent Skin Injury  Flowsheets (Taken 7/28/2023 1006)  Body Position: position changed independently  Skin Protection:   adhesive use limited   incontinence pads utilized  Intervention: Prevent and Manage VTE (Venous Thromboembolism) Risk  Flowsheets (Taken 7/28/2023 1006)  Activity Management: Walk with assistive devise and /or staff member - L3  VTE Prevention/Management:   ambulation promoted   intravenous hydration  Intervention: Prevent Infection  Flowsheets (Taken 7/28/2023 1006)  Infection Prevention:   cohorting utilized   hand hygiene promoted   rest/sleep promoted   single patient room provided  Goal: Optimal Comfort and Wellbeing  Outcome: Ongoing, Progressing  Intervention: Monitor Pain and Promote Comfort  Flowsheets (Taken 7/28/2023 1006)  Pain Management Interventions:   care clustered   pillow support provided   quiet environment facilitated  Intervention: Provide Person-Centered Care  Flowsheets (Taken 7/28/2023 1006)  Trust Relationship/Rapport:   care explained   questions answered   thoughts/feelings acknowledged   questions encouraged   choices provided   empathic listening provided  Goal: Readiness for Transition of Care  Outcome: Ongoing, Progressing

## 2023-07-28 NOTE — PROGRESS NOTES
"   Acute Care Surgery   Progress Note  Admit Date: 7/21/2023  HD#6  POD#2 Days Post-Op    Subjective:   Interval history:  AFVSS  TF at @35   Tube 2 cm at skin  Incision sites c/d/i  No complaints this am   Denies abdominal pain, fever, chills, pain    Home Meds:  Current Outpatient Medications   Medication Instructions    albuterol (PROVENTIL/VENTOLIN HFA) 90 mcg/actuation inhaler 2 puffs, Inhalation, Every 6 hours PRN, Rescue    amLODIPine (NORVASC) 5 mg, Oral, Daily    atorvastatin (LIPITOR) 20 mg, Oral, Daily    carvediloL (COREG) 3.125 mg, Oral, 2 times daily with meals    clopidogreL (PLAVIX) 75 mg, Oral, Daily    donepeziL (ARICEPT) 10 mg, Oral, Nightly    fluconazole (DIFLUCAN) 100 mg, Oral, Daily    fluticasone propionate (FLOVENT HFA) 220 mcg/actuation inhaler Inhalation, 2 times daily, Controller    levETIRAcetam (KEPPRA) 500 mg, Oral, 2 times daily    omeprazole (PRILOSEC) 20 MG capsule Oral, Daily    sacubitriL-valsartan (ENTRESTO) 49-51 mg per tablet 1 tablet, Oral, 2 times daily      Scheduled Meds:   famotidine (PF)  20 mg Intravenous Daily    levETIRAcetam (Keppra) IV (PEDS and ADULTS)  500 mg Intravenous Q12H     Continuous Infusions:   Amino acid 4.25% - dextrose 5% (CLINIMIX-E) solution (1L provides 42.5 gm AA, 50 gm CHO (170 kcal/L dextrose), Na 35, K 30, Mg 5, Ca 4.5, Acetate 70, Cl 39, Phos 15) 75 mL/hr at 07/28/23 0148    electrolyte-A       PRN Meds:albuterol, hydrALAZINE, labetalol, melatonin, sodium chloride 0.9%     Objective:     VITAL SIGNS: 24 HR MIN & MAX LAST   Temp  Min: 97.6 °F (36.4 °C)  Max: 98.3 °F (36.8 °C)  98.2 °F (36.8 °C)   BP  Min: 114/78  Max: 152/81  128/84    Pulse  Min: 75  Max: 93  93    Resp  Min: 16  Max: 20  19    SpO2  Min: 97 %  Max: 100 %  98 %      HT: 5' 5" (165.1 cm)  WT: 54.4 kg (120 lb)  BMI:       Intake/output:  Intake/Output - Last 3 Shifts         07/26 0700 07/27 0659 07/27 0700 07/28 0659    P.O.  480    NG/GT  30    IV Piggyback 700     Total " Intake(mL/kg) 700 (12.9) 510 (9.4)    Urine (mL/kg/hr) 1450 (1.1) 600 (0.5)    Drains 100     Total Output 1550 600    Net -850 -90          Urine Occurrence  1 x    Stool Occurrence  0 x            Intake/Output Summary (Last 24 hours) at 7/28/2023 0543  Last data filed at 7/27/2023 2000  Gross per 24 hour   Intake 510 ml   Output 1200 ml   Net -690 ml             Lines/drains/airway:       Peripheral IV - Single Lumen 07/24/23 2318 Anterior;Right Forearm (Active)   Site Assessment Clean;Intact;Dry 07/26/23 0400   Extremity Assessment Distal to IV No abnormal discoloration;No redness;No swelling;No warmth 07/25/23 2000   Line Status Infusing 07/26/23 0400   Dressing Status Clean;Dry;Intact 07/26/23 0400   Dressing Intervention Integrity maintained 07/26/23 0400   Number of days: 1       Physical examination:  Gen: NAD, AAOx3, answering questions appropriately  HEENT: NCAT  CV: RR  Resp: NWOB on RA  Abd: S/NT/ND. G tube to gravity, 100cc gastric content.  No N/V. Tube 2 cm at skin. Incision sites c/d/i with steri-strips in place  Ext: moving all extremities spontaneously and purposefully  Neuro: CN II-XII grossly intact    Labs:  Renal:  Recent Labs     07/26/23 0620   BUN 20.8   CREATININE 1.02       No results for input(s): LACTIC in the last 72 hours.  FENGI:  Recent Labs     07/25/23  1119 07/26/23 0620   NA  --  136   K  --  4.9   CO2  --  23   CALCIUM  --  8.5*   MG 2.30  --    PHOS 3.1  --    ALBUMIN  --  2.8*   BILITOT  --  0.6   AST  --  16   ALKPHOS  --  58   ALT  --  7       Heme:  Recent Labs     07/26/23 0620   HGB 11.8*   HCT 37.2*          ID:  Recent Labs     07/26/23 0620   WBC 4.20*       CBG:  Recent Labs     07/26/23 0620   GLUCOSE 89        Cardiovascular:  No results for input(s): TROPONINI, CKTOTAL, CKMB, BNP in the last 168 hours.  I have reviewed all pertinent lab results within the past 24 hours.    Imaging:  Fl Modified Barium Swallow Speech   Final Result      X-Ray Chest 1  View   Final Result      No acute cardiopulmonary process.         Electronically signed by: Rakesh Mena   Date:    07/21/2023   Time:    14:56           I have reviewed all pertinent imaging results/findings within the past 24 hours.    Micro/Path/Other:  Microbiology Results (last 7 days)       ** No results found for the last 168 hours. **           Specimen (168h ago, onward)       Start     Ordered    07/24/23 1323  Specimen to Pathology  RELEASE UPON ORDERING        References:    Click here for ordering Quick Tip   Question:  Release to patient  Answer:  Immediate    07/24/23 1323                     Assessment & Plan:   82-year-old male with esophageal cancer history an esophageal stenosis who presented to the hospital on 07/21 with dysphagia. PEG unable to be placed with GI team. General surgery team consulted for G-tube placement. Now 2 Days Post-Op s/p G tube placement.      - OK to advance tube feeds to goal  - OK for meds by G tube   - Nutrition following for TF recs   - OK to restart Plavix   - Acute Care Surgery signing off at this time, please do not hesitate to contact us for any acute changes in exam, questions, or concerns   - Rest of care per primary     Hardik Cohn MD  [facility] General Surgery HO-1  6:08 AM   07/28/2023

## (undated) DEVICE — COLLECTION SPECIMEN NEPTUNE

## (undated) DEVICE — KIT GEN LAPAROSCOPY LAFAYETTE

## (undated) DEVICE — TUBING O2 FEMALE CONN 13FT

## (undated) DEVICE — GLOVE PROTEXIS LTX MICRO  7.5

## (undated) DEVICE — TAPE SILK 3IN

## (undated) DEVICE — FORCEP BX LG CAP 2.8MMX240CM

## (undated) DEVICE — SUT VICRYL PLUS 4-0 FS-2 27IN

## (undated) DEVICE — BAG DRAIN ANTI REFLUX 2000ML

## (undated) DEVICE — SCISSOR CURVED ENDOPATH 5MM

## (undated) DEVICE — IRRIGATOR SUCTION W/TIP

## (undated) DEVICE — SEAL UNIVERSAL 5MM-8MM XI

## (undated) DEVICE — SYR SLIP TIP 10ML SHIELD

## (undated) DEVICE — APPLICATOR CHLORAPREP ORN 26ML

## (undated) DEVICE — TRAY CATH FOL SIL URIMTR 16FR

## (undated) DEVICE — KIT SURGICAL TURNOVER

## (undated) DEVICE — ADHESIVE MASTISOL VIAL 48/BX

## (undated) DEVICE — SUT 2/0 30IN SILK BLK BRAI

## (undated) DEVICE — ELECTRODE PATIENT RETURN DISP

## (undated) DEVICE — COVER TIP CURVED SCISSORS XI

## (undated) DEVICE — NDL HYPO REG 25G X 1 1/2

## (undated) DEVICE — KIT CANIST SUCTION 1200CC

## (undated) DEVICE — WARMER DRAPE STERILE LF

## (undated) DEVICE — SOL IRRI STRL WATER 1000ML

## (undated) DEVICE — KIT SURGICAL COLON .25 1.1OZ

## (undated) DEVICE — APPLICATOR STRL COT 2INNR 6IN

## (undated) DEVICE — CONTAINER SPECIMEN SCREW 4OZ

## (undated) DEVICE — FORCEP ALLIGATOR 2.8MM W/NDL

## (undated) DEVICE — COVER MAYO STAND REINFRCD 30

## (undated) DEVICE — NDL INSUF ULTRA VERESS 120MM

## (undated) DEVICE — SOL NACL IRR 1000ML BTL

## (undated) DEVICE — CORD LAP 10 DISP

## (undated) DEVICE — CANNULA ENDOPATH XCEL 5X100MM

## (undated) DEVICE — TROCAR ENDOPATH XCEL 5X100MM

## (undated) DEVICE — SUT MCRYL PLUS 4-0 PS2 27IN

## (undated) DEVICE — TIP SUCTION YANKAUER

## (undated) DEVICE — DRAPE ARM DAVINCI XI

## (undated) DEVICE — DRAPE COLUMN DAVINCI XI

## (undated) DEVICE — CLOSURE SKIN STERI STRIP 1/2X4

## (undated) DEVICE — BENZOIN TINCTURE 0.66ML

## (undated) DEVICE — SUT VICRYL+ 27 UR-6 VIOL

## (undated) DEVICE — SOL CLEARIFY VISUALIZATION LAP

## (undated) DEVICE — SYR 10CC LUER LOCK

## (undated) DEVICE — BAG LABGUARD BIOHAZARD 6X9IN

## (undated) DEVICE — Device

## (undated) DEVICE — SPONGE EXCILON DRN 4X4IN 6PLY

## (undated) DEVICE — HOLDER STRIP-T SELF ADH 2X10IN

## (undated) DEVICE — OBTURATOR BLADELESS 8MM XI CLR

## (undated) DEVICE — NDL SAFETY 22G X 1.5 ECLIPSE